# Patient Record
Sex: MALE | Race: WHITE | Employment: OTHER | URBAN - METROPOLITAN AREA
[De-identification: names, ages, dates, MRNs, and addresses within clinical notes are randomized per-mention and may not be internally consistent; named-entity substitution may affect disease eponyms.]

---

## 2018-06-07 ENCOUNTER — HOSPITAL ENCOUNTER (OUTPATIENT)
Dept: AUDIOLOGY | Age: 78
Discharge: HOME | End: 2018-06-07
Payer: COMMERCIAL

## 2018-06-07 PROCEDURE — 92567 TYMPANOMETRY: CPT | Performed by: AUDIOLOGIST

## 2018-06-07 PROCEDURE — 92557 COMPREHENSIVE HEARING TEST: CPT | Performed by: AUDIOLOGIST

## 2018-08-07 ENCOUNTER — HOSPITAL ENCOUNTER (OUTPATIENT)
Dept: AUDIOLOGY | Age: 78
Discharge: HOME | End: 2018-08-07

## 2018-08-23 ENCOUNTER — HOSPITAL ENCOUNTER (OUTPATIENT)
Dept: AUDIOLOGY | Age: 78
Discharge: HOME | End: 2018-08-23

## 2018-08-23 PROCEDURE — V5160 DISPENSING FEE BINAURAL: HCPCS | Performed by: AUDIOLOGIST

## 2018-08-23 PROCEDURE — V5261 HEARING AID, DIGIT, BIN, BTE: HCPCS | Performed by: AUDIOLOGIST

## 2019-04-06 ENCOUNTER — APPOINTMENT (OUTPATIENT)
Dept: GENERAL RADIOLOGY | Age: 79
End: 2019-04-06
Payer: COMMERCIAL

## 2019-04-06 ENCOUNTER — HOSPITAL ENCOUNTER (EMERGENCY)
Age: 79
Discharge: HOME | End: 2019-04-06
Payer: COMMERCIAL

## 2019-04-06 VITALS
HEART RATE: 78 BPM | TEMPERATURE: 97.8 F | BODY MASS INDEX: 20.88 KG/M2 | OXYGEN SATURATION: 99 % | DIASTOLIC BLOOD PRESSURE: 65 MMHG | HEIGHT: 67 IN | SYSTOLIC BLOOD PRESSURE: 151 MMHG | RESPIRATION RATE: 20 BRPM | WEIGHT: 133 LBS

## 2019-04-06 DIAGNOSIS — R11.2 NON-INTRACTABLE VOMITING WITH NAUSEA, UNSPECIFIED VOMITING TYPE: Primary | ICD-10-CM

## 2019-04-06 LAB
ALBUMIN SERPL-MCNC: 3.7 G/DL (ref 3.5–5.2)
ALP BLD-CCNC: 99 U/L (ref 40–129)
ALT SERPL-CCNC: 10 U/L (ref 0–40)
ANION GAP SERPL CALCULATED.3IONS-SCNC: 14 MMOL/L (ref 7–16)
AST SERPL-CCNC: 29 U/L (ref 0–39)
BASOPHILS ABSOLUTE: 0.03 E9/L (ref 0–0.2)
BASOPHILS RELATIVE PERCENT: 0.2 % (ref 0–2)
BETA-HYDROXYBUTYRATE: 1.27 MMOL/L (ref 0.02–0.27)
BILIRUB SERPL-MCNC: 1.1 MG/DL (ref 0–1.2)
BUN BLDV-MCNC: 16 MG/DL (ref 8–23)
CALCIUM SERPL-MCNC: 10.1 MG/DL (ref 8.6–10.2)
CHLORIDE BLD-SCNC: 102 MMOL/L (ref 98–107)
CO2: 23 MMOL/L (ref 22–29)
CO2: 25 MMOL/L (ref 22–29)
CO2: 29 MMOL/L (ref 22–29)
CREAT SERPL-MCNC: 0.9 MG/DL (ref 0.7–1.2)
EOSINOPHILS ABSOLUTE: 0.06 E9/L (ref 0.05–0.5)
EOSINOPHILS RELATIVE PERCENT: 0.4 % (ref 0–6)
GFR AFRICAN AMERICAN: >60
GFR NON-AFRICAN AMERICAN: >60 ML/MIN/1.73
GLUCOSE BLD-MCNC: 148 MG/DL (ref 74–99)
GLUCOSE BLD-MCNC: 159 MG/DL (ref 74–99)
GLUCOSE BLD-MCNC: 169 MG/DL (ref 74–99)
HCT VFR BLD CALC: 45.3 % (ref 37–54)
HEMOGLOBIN: 15.4 G/DL (ref 12.5–16.5)
IMMATURE GRANULOCYTES #: 0.05 E9/L
IMMATURE GRANULOCYTES %: 0.4 % (ref 0–5)
LACTIC ACID: 2 MMOL/L (ref 0.5–2.2)
LIPASE: 9 U/L (ref 13–60)
LYMPHOCYTES ABSOLUTE: 1.25 E9/L (ref 1.5–4)
LYMPHOCYTES RELATIVE PERCENT: 9.2 % (ref 20–42)
MCH RBC QN AUTO: 29.6 PG (ref 26–35)
MCHC RBC AUTO-ENTMCNC: 34 % (ref 32–34.5)
MCV RBC AUTO: 87.1 FL (ref 80–99.9)
MONOCYTES ABSOLUTE: 0.98 E9/L (ref 0.1–0.95)
MONOCYTES RELATIVE PERCENT: 7.2 % (ref 2–12)
NEUTROPHILS ABSOLUTE: 11.24 E9/L (ref 1.8–7.3)
NEUTROPHILS RELATIVE PERCENT: 82.6 % (ref 43–80)
PDW BLD-RTO: 12.7 FL (ref 11.5–15)
PH VENOUS: 7.39 (ref 7.3–7.42)
PLATELET # BLD: 176 E9/L (ref 130–450)
PMV BLD AUTO: 11.8 FL (ref 7–12)
POC ANION GAP: 13 MMOL/L (ref 7–16)
POC ANION GAP: 6 MMOL/L (ref 7–16)
POC BUN: 16 MG/DL (ref 8–23)
POC BUN: 22 MG/DL (ref 8–23)
POC CHLORIDE: 102 MMOL/L (ref 100–108)
POC CHLORIDE: 104 MMOL/L (ref 100–108)
POC CREATININE: 0.9 MG/DL (ref 0.7–1.2)
POC CREATININE: 0.9 MG/DL (ref 0.7–1.2)
POC POTASSIUM: 4 MMOL/L (ref 3.5–5)
POC POTASSIUM: 6.8 MMOL/L (ref 3.5–5)
POC SODIUM: 139 MMOL/L (ref 132–146)
POC SODIUM: 140 MMOL/L (ref 132–146)
POTASSIUM SERPL-SCNC: 5.9 MMOL/L (ref 3.5–5)
RBC # BLD: 5.2 E12/L (ref 3.8–5.8)
SODIUM BLD-SCNC: 139 MMOL/L (ref 132–146)
TOTAL PROTEIN: 7.3 G/DL (ref 6.4–8.3)
TROPONIN: <0.01 NG/ML (ref 0–0.03)
WBC # BLD: 13.6 E9/L (ref 4.5–11.5)

## 2019-04-06 PROCEDURE — 84520 ASSAY OF UREA NITROGEN: CPT

## 2019-04-06 PROCEDURE — 82800 BLOOD PH: CPT

## 2019-04-06 PROCEDURE — 80051 ELECTROLYTE PANEL: CPT

## 2019-04-06 PROCEDURE — 85025 COMPLETE CBC W/AUTO DIFF WBC: CPT

## 2019-04-06 PROCEDURE — 83690 ASSAY OF LIPASE: CPT

## 2019-04-06 PROCEDURE — 84484 ASSAY OF TROPONIN QUANT: CPT

## 2019-04-06 PROCEDURE — 74022 RADEX COMPL AQT ABD SERIES: CPT

## 2019-04-06 PROCEDURE — 36415 COLL VENOUS BLD VENIPUNCTURE: CPT

## 2019-04-06 PROCEDURE — 82565 ASSAY OF CREATININE: CPT

## 2019-04-06 PROCEDURE — 83605 ASSAY OF LACTIC ACID: CPT

## 2019-04-06 PROCEDURE — 82947 ASSAY GLUCOSE BLOOD QUANT: CPT

## 2019-04-06 PROCEDURE — 82010 KETONE BODYS QUAN: CPT

## 2019-04-06 PROCEDURE — 93005 ELECTROCARDIOGRAM TRACING: CPT | Performed by: PHYSICIAN ASSISTANT

## 2019-04-06 PROCEDURE — 80053 COMPREHEN METABOLIC PANEL: CPT

## 2019-04-06 PROCEDURE — 2580000003 HC RX 258: Performed by: PHYSICIAN ASSISTANT

## 2019-04-06 PROCEDURE — 99284 EMERGENCY DEPT VISIT MOD MDM: CPT

## 2019-04-06 RX ORDER — 0.9 % SODIUM CHLORIDE 0.9 %
500 INTRAVENOUS SOLUTION INTRAVENOUS ONCE
Status: COMPLETED | OUTPATIENT
Start: 2019-04-06 | End: 2019-04-06

## 2019-04-06 RX ADMIN — SODIUM CHLORIDE 500 ML: 9 INJECTION, SOLUTION INTRAVENOUS at 15:10

## 2019-04-06 NOTE — ED PROVIDER NOTES
[facility]       Department of Emergency Medicine   ED  Provider Note  Admit Date/RoomTime: 4/6/2019  1:48 PM  ED Room: 23/23  Chief Complaint   Emesis (vomiting since last pm no diarrhea)    History of Present Illness   Source of history provided by:  patient. History/Exam Limitations: none. Cat Arguelles is a 78 y.o. old male with a past medical history of   Past Medical History:   Diagnosis Date    Diabetes mellitus ([facility] [address]     Hypertension     presents to the emergency department by private vehicle, with complaints of nausea and vomiting that started last night. Patient states that he vomited once last night. Patient states that he has not had diarrhea. No fevers or chills. He denies dysuria. Family is concerned b/c he would not eat today. He states he doesn't feel hungry, but denies any current nausea. He denies chest pain or SOB. He denies any abdominal pain. ROS    Pertinent positives and negatives are stated within HPI, all other systems reviewed and are negative. Past Surgical History:   Procedure Laterality Date    CARDIAC SURGERY     Social History:  reports that he has never smoked. He has never used smokeless tobacco. He reports that he does not drink alcohol or use drugs. Family History: family history is not on file. Allergies: Patient has no known allergies. Physical Exam           ED Triage Vitals [04/06/19 1345]   BP Temp Temp Source Pulse Resp SpO2 Height Weight   (!) 150/82 97.8 °F (36.6 °C) Oral 108 16 97 % 5' 7\" (1.702 m) 133 lb (60.3 kg)      Oxygen Saturation Interpretation: Normal.    Constitutional:  Alert, development consistent with age. HEENT:  NC/NT. Airway patent. Neck:  Normal ROM. Supple. Respiratory:  Clear to auscultation and breath sounds equal.  CV:  Regular rate and rhythm, normal heart sounds, without pathological murmurs, ectopy, gallops, or rubs. GI:  General Appearance: normal.       Bowel sounds: normal bowel sounds.          Distension: None.          Tenderness: No abdominal tenderness, no rebound tenderness, no guarding. Liver: non-tender. Spleen:  non-tender. Pulsatile Mass: absent. Hernia:  no inguinal or femoral hernias noted. Rectal: (chaperone present during examination). not indicated / deferred. Back: CVA Tenderness: None b/l. Integument:  Normal turgor. Warm, dry, without visible rash, unless noted elsewhere. No pitting edema b/l. 2+ dorsalis pedis pulses b/l. Lymphatics: No lymphangitis or adenopathy noted. Neurological:  Oriented. Motor functions intact.     Lab / Imaging Results   (All laboratory and radiology results have been personally reviewed by myself)  Labs:  Results for orders placed or performed during the hospital encounter of 04/06/19   CBC Auto Differential   Result Value Ref Range    WBC 13.6 (H) 4.5 - 11.5 E9/L    RBC 5.20 3.80 - 5.80 E12/L    Hemoglobin 15.4 12.5 - 16.5 g/dL    Hematocrit 45.3 37.0 - 54.0 %    MCV 87.1 80.0 - 99.9 fL    MCH 29.6 26.0 - 35.0 pg    MCHC 34.0 32.0 - 34.5 %    RDW 12.7 11.5 - 15.0 fL    Platelets 929 966 - 061 E9/L    MPV 11.8 7.0 - 12.0 fL    Neutrophils % 82.6 (H) 43.0 - 80.0 %    Immature Granulocytes % 0.4 0.0 - 5.0 %    Lymphocytes % 9.2 (L) 20.0 - 42.0 %    Monocytes % 7.2 2.0 - 12.0 %    Eosinophils % 0.4 0.0 - 6.0 %    Basophils % 0.2 0.0 - 2.0 %    Neutrophils # 11.24 (H) 1.80 - 7.30 E9/L    Immature Granulocytes # 0.05 E9/L    Lymphocytes # 1.25 (L) 1.50 - 4.00 E9/L    Monocytes # 0.98 (H) 0.10 - 0.95 E9/L    Eosinophils # 0.06 0.05 - 0.50 E9/L    Basophils # 0.03 0.00 - 0.20 E9/L   Comprehensive Metabolic Panel   Result Value Ref Range    Sodium 139 132 - 146 mmol/L    Potassium 5.9 (H) 3.5 - 5.0 mmol/L    Chloride 102 98 - 107 mmol/L    CO2 23 22 - 29 mmol/L    Anion Gap 14 7 - 16 mmol/L    Glucose 169 (H) 74 - 99 mg/dL    BUN 16 8 - 23 mg/dL    CREATININE 0.9 0.7 - 1.2 mg/dL    GFR Non-African American >60 >=60 mL/min/1.73    GFR African American >60     Calcium 10.1 8.6 - 10.2 mg/dL    Total Protein 7.3 6.4 - 8.3 g/dL    Alb 3.7 3.5 - 5.2 g/dL    Total Bilirubin 1.1 0.0 - 1.2 mg/dL    Alkaline Phosphatase 99 40 - 129 U/L    ALT 10 0 - 40 U/L    AST 29 0 - 39 U/L   Troponin   Result Value Ref Range    Troponin <0.01 0.00 - 0.03 ng/mL   Lactic Acid, Plasma   Result Value Ref Range    Lactic Acid 2.0 0.5 - 2.2 mmol/L   Lipase   Result Value Ref Range    Lipase 9 (L) 13 - 60 U/L   pH, venous   Result Value Ref Range    pH, Mahesh 7.39 7.30 - 7.42   Beta-Hydroxybutyrate   Result Value Ref Range    Beta-Hydroxybutyrate 1.27 (H) 0.02 - 0.27 mmol/L   POCT Venous   Result Value Ref Range    POC Sodium 139 132 - 146 mmol/L    POC Potassium 6.8 (HH) 3.5 - 5.0 mmol/L    POC Chloride 104 100 - 108 mmol/L    CO2 29 22 - 29 mmol/L    POC Anion Gap 6 (L) 7 - 16 mmol/L    POC Glucose 148 (H) 74 - 99 mg/dl    POC BUN 22 8 - 23 mg/dL    POC Creatinine 0.9 0.7 - 1.2 mg/dL    GFR Non-African American >60 >=60 mL/min/1.73    GFR African American >60    POCT Venous   Result Value Ref Range    POC Sodium 140 132 - 146 mmol/L    POC Potassium 4.0 3.5 - 5.0 mmol/L    POC Chloride 102 100 - 108 mmol/L    CO2 25 22 - 29 mmol/L    POC Anion Gap 13 7 - 16 mmol/L    POC Glucose 159 (H) 74 - 99 mg/dl    POC BUN 16 8 - 23 mg/dL    POC Creatinine 0.9 0.7 - 1.2 mg/dL    GFR Non-African American >60 >=60 mL/min/1.73    GFR African American >60    EKG 12 Lead   Result Value Ref Range    Ventricular Rate 92 BPM    Atrial Rate 92 BPM    P-R Interval 144 ms    QRS Duration 112 ms    Q-T Interval 364 ms    QTc Calculation (Bazett) 450 ms    P Axis 68 degrees    R Axis -22 degrees    T Axis 37 degrees     Imaging: All Radiology results interpreted by Radiologist unless otherwise noted. XR Acute Abd Series Chest 1 VW   Final Result   Normal acute abdominal series.            ED Course / Medical Decision Making     Medications   0.9 % sodium chloride bolus (0 mLs Intravenous Stopped 4/6/19 1617)        Re-examination:  4/6/19       Time: 2070   Patient updated on results. Continues to deny any abdominal pain to palpation. Abdomen is soft and nontender throughout palpation. No rigidity or sign of surgical abdomen. Agreeable to be discharged home at this time. EKG #1:  Interpreted by emergency department physician unless otherwise noted. Time:  1417    Rate: 92 bpm  Rhythm: Sinus. No ST segment elevation or depression. IL int is 144 ms, QRS duration is 112 ms. Incomplete RBBB. No acute changes from EKG done on 11/20/17. Consult(s):   none. Procedure(s):   none    MDM:   Patient denies any complaints upon arrival to the ER. He has had no abdominal pain to palpation throughout his stay today. There is no rigidity or sign of surgical abdomen. Labs are sent are unremarkable, no acute findings seen on x-ray today. Vital signs are stable. Patient appears well. We'll discharge home at this time. Advised patient to return to the ER for any worsening symptoms. Otherwise he is to follow-up with his PCP. Counseling: The emergency provider has spoken with the patient and discussed todays results, in addition to providing specific details for the plan of care and counseling regarding the diagnosis and prognosis. Questions are answered at this time and they are agreeable with the plan. Assessment     1. Non-intractable vomiting with nausea, unspecified vomiting type      Plan   Discharge to home  Patient condition is good    New Medications     Discharge Medication List as of 4/6/2019  4:41 PM        Electronically signed by MARQUIS Strong   DD: 4/6/19  **This report was transcribed using voice recognition software. Every effort was made to ensure accuracy; however, inadvertent computerized transcription errors may be present.   END OF ED PROVIDER NOTE       Veronica Strong  04/06/19 7350

## 2019-04-07 LAB
EKG ATRIAL RATE: 92 BPM
EKG P AXIS: 68 DEGREES
EKG P-R INTERVAL: 144 MS
EKG Q-T INTERVAL: 364 MS
EKG QRS DURATION: 112 MS
EKG QTC CALCULATION (BAZETT): 450 MS
EKG R AXIS: -22 DEGREES
EKG T AXIS: 37 DEGREES
EKG VENTRICULAR RATE: 92 BPM

## 2019-06-08 ENCOUNTER — APPOINTMENT (OUTPATIENT)
Dept: GENERAL RADIOLOGY | Age: 79
End: 2019-06-08
Payer: COMMERCIAL

## 2019-06-08 ENCOUNTER — HOSPITAL ENCOUNTER (EMERGENCY)
Age: 79
Discharge: HOME | End: 2019-06-08
Attending: EMERGENCY MEDICINE
Payer: COMMERCIAL

## 2019-06-08 VITALS
HEIGHT: 68 IN | RESPIRATION RATE: 20 BRPM | HEART RATE: 71 BPM | BODY MASS INDEX: 18.49 KG/M2 | DIASTOLIC BLOOD PRESSURE: 78 MMHG | WEIGHT: 122 LBS | SYSTOLIC BLOOD PRESSURE: 179 MMHG | OXYGEN SATURATION: 99 % | TEMPERATURE: 97.6 F

## 2019-06-08 DIAGNOSIS — M53.3 SACRAL PAIN: ICD-10-CM
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
## 2019-06-08 LAB
ANION GAP SERPL CALCULATED.3IONS-SCNC: 19 MMOL/L (ref 7–16)
BASOPHILS ABSOLUTE: 0.02 E9/L (ref 0–0.2)
BASOPHILS RELATIVE PERCENT: 0.2 % (ref 0–2)
BUN BLDV-MCNC: 19 MG/DL (ref 8–23)
CALCIUM SERPL-MCNC: 10.1 MG/DL (ref 8.6–10.2)
CHLORIDE BLD-SCNC: 93 MMOL/L (ref 98–107)
CO2: 20 MMOL/L (ref 22–29)
CREAT SERPL-MCNC: 1 MG/DL (ref 0.7–1.2)
EOSINOPHILS ABSOLUTE: 0.05 E9/L (ref 0.05–0.5)
EOSINOPHILS RELATIVE PERCENT: 0.5 % (ref 0–6)
GFR AFRICAN AMERICAN: >60
GFR NON-AFRICAN AMERICAN: >60 ML/MIN/1.73
GLUCOSE BLD-MCNC: 456 MG/DL (ref 74–99)
HCT VFR BLD CALC: 43 % (ref 37–54)
HEMOGLOBIN: 14.5 G/DL (ref 12.5–16.5)
IMMATURE GRANULOCYTES #: 0.04 E9/L
IMMATURE GRANULOCYTES %: 0.4 % (ref 0–5)
LYMPHOCYTES ABSOLUTE: 1.28 E9/L (ref 1.5–4)
LYMPHOCYTES RELATIVE PERCENT: 11.7 % (ref 20–42)
MCH RBC QN AUTO: 28.8 PG (ref 26–35)
MCHC RBC AUTO-ENTMCNC: 33.7 % (ref 32–34.5)
MCV RBC AUTO: 85.3 FL (ref 80–99.9)
MONOCYTES ABSOLUTE: 0.83 E9/L (ref 0.1–0.95)
MONOCYTES RELATIVE PERCENT: 7.6 % (ref 2–12)
NEUTROPHILS ABSOLUTE: 8.73 E9/L (ref 1.8–7.3)
NEUTROPHILS RELATIVE PERCENT: 79.6 % (ref 43–80)
PDW BLD-RTO: 13.3 FL (ref 11.5–15)
PLATELET # BLD: 232 E9/L (ref 130–450)
PMV BLD AUTO: 11 FL (ref 7–12)
POTASSIUM SERPL-SCNC: 4.8 MMOL/L (ref 3.5–5)
RBC # BLD: 5.04 E12/L (ref 3.8–5.8)
SODIUM BLD-SCNC: 132 MMOL/L (ref 132–146)
TOTAL CK: 57 U/L (ref 20–200)
TROPONIN: <0.01 NG/ML (ref 0–0.03)
WBC # BLD: 11 E9/L (ref 4.5–11.5)

## 2019-06-08 PROCEDURE — 71045 X-RAY EXAM CHEST 1 VIEW: CPT

## 2019-06-08 PROCEDURE — 72220 X-RAY EXAM SACRUM TAILBONE: CPT

## 2019-06-08 PROCEDURE — 82550 ASSAY OF CK (CPK): CPT

## 2019-06-08 PROCEDURE — 99284 EMERGENCY DEPT VISIT MOD MDM: CPT

## 2019-06-08 PROCEDURE — 80048 BASIC METABOLIC PNL TOTAL CA: CPT

## 2019-06-08 PROCEDURE — 72170 X-RAY EXAM OF PELVIS: CPT

## 2019-06-08 PROCEDURE — 85025 COMPLETE CBC W/AUTO DIFF WBC: CPT

## 2019-06-08 PROCEDURE — 93005 ELECTROCARDIOGRAM TRACING: CPT | Performed by: EMERGENCY MEDICINE

## 2019-06-08 PROCEDURE — 36415 COLL VENOUS BLD VENIPUNCTURE: CPT

## 2019-06-08 PROCEDURE — 84484 ASSAY OF TROPONIN QUANT: CPT

## 2019-06-08 RX ORDER — METOPROLOL SUCCINATE 25 MG/1
25 TABLET, EXTENDED RELEASE ORAL DAILY
COMMUNITY
End: 2019-10-30 | Stop reason: ALTCHOICE

## 2019-06-09 LAB
EKG ATRIAL RATE: 82 BPM
EKG P AXIS: 18 DEGREES
EKG P-R INTERVAL: 122 MS
EKG Q-T INTERVAL: 412 MS
EKG QRS DURATION: 106 MS
EKG QTC CALCULATION (BAZETT): 481 MS
EKG R AXIS: 22 DEGREES
EKG T AXIS: 32 DEGREES
EKG VENTRICULAR RATE: 82 BPM

## 2019-06-09 PROCEDURE — 93010 ELECTROCARDIOGRAM REPORT: CPT | Performed by: INTERNAL MEDICINE

## 2019-06-09 ASSESSMENT — ENCOUNTER SYMPTOMS
PHOTOPHOBIA: 0
DIARRHEA: 0
CONSTIPATION: 0
WHEEZING: 0
SHORTNESS OF BREATH: 0
ABDOMINAL PAIN: 0
SORE THROAT: 0
NAUSEA: 0
VOMITING: 0
COUGH: 0
RHINORRHEA: 0
SINUS PRESSURE: 0
SINUS PAIN: 0

## 2019-06-09 NOTE — ED PROVIDER NOTES
Patient is an 78-year-old male who presents with chief complaint of coccyx pain following a fall. He is accompanied by his family members provide most the history. They state that they last checked on their father at 6:00 PM, when they put him into bed. They came to check on him this morning and found him on the floor in the living room. The patient states that he lost his balance and fell onto his coccyx. He did not hit his head or lose consciousness. He was unable to get up off the ground because he is weak. He laid on the ground for approximately 15 hours. Patient complains of coccyx pain, denies any numbness or tingling of the lower extremities. No treatment prior to arrival. The patient has no complaints other than the coccyx pain. He denies any headache, lightheadedness, dizziness, chest pain, shortness of breath, abdominal pain, nausea, vomiting. The history is provided by a relative. No  was used. Review of Systems   Constitutional: Negative for chills, fatigue and fever. HENT: Negative for congestion, rhinorrhea, sinus pressure, sinus pain, sneezing and sore throat. Eyes: Negative for photophobia and visual disturbance. Respiratory: Negative for cough, shortness of breath and wheezing. Cardiovascular: Negative for chest pain and palpitations. Gastrointestinal: Negative for abdominal pain, constipation, diarrhea, nausea and vomiting. Genitourinary: Negative for dysuria, frequency and hematuria. Musculoskeletal: Positive for arthralgias (coccyx pain). Negative for neck pain and neck stiffness. Skin: Negative for rash and wound. Neurological: Negative for dizziness, light-headedness and headaches. Psychiatric/Behavioral: Negative for agitation, behavioral problems and confusion. Physical Exam   Constitutional: He is oriented to person, place, and time. He appears well-developed and well-nourished. No distress.    HENT:   Head: Normocephalic and atraumatic. Eyes: Conjunctivae are normal. Right eye exhibits no discharge. Left eye exhibits no discharge. No scleral icterus. Neck: Normal range of motion. Neck supple. Cardiovascular: Normal rate and regular rhythm. No murmur heard. Pulmonary/Chest: Effort normal and breath sounds normal. No stridor. No respiratory distress. Abdominal: Soft. Bowel sounds are normal. He exhibits no distension. There is no tenderness. Genitourinary:   Genitourinary Comments: Stage 1 decubitus ulcer of the coccyx. No overlying ecchymosis. Musculoskeletal: Normal range of motion. He exhibits no edema or tenderness. Patient moves all 4 extremities. No obvious deformity. No overlying ecchymosis. No midline tenderness of the thoracic or lumbar spine. Neurological: He is alert and oriented to person, place, and time. The patient is alert and oriented, he is following all commands. No facial droop. Skin: Skin is warm. Capillary refill takes less than 2 seconds. He is not diaphoretic. No erythema. No pallor. Psychiatric: He has a normal mood and affect. His behavior is normal.       Procedures    [facility]            --------------------------------------------- PAST HISTORY ---------------------------------------------  Past Medical History:  has a past medical history of Diabetes mellitus ([facility] [address] and Hypertension. Past Surgical History:  has a past surgical history that includes Cardiac surgery. Social History:  reports that he has never smoked. He has never used smokeless tobacco. He reports that he does not drink alcohol or use drugs. Family History: family history is not on file. The patients home medications have been reviewed. Allergies: Patient has no known allergies.     -------------------------------------------------- RESULTS -------------------------------------------------  Labs:  Results for orders placed or performed during the hospital encounter of 06/08/19   CBC Auto Differential   Result Value Ref Range    WBC 11.0 4.5 - 11.5 E9/L    RBC 5.04 3.80 - 5.80 E12/L    Hemoglobin 14.5 12.5 - 16.5 g/dL    Hematocrit 43.0 37.0 - 54.0 %    MCV 85.3 80.0 - 99.9 fL    MCH 28.8 26.0 - 35.0 pg    MCHC 33.7 32.0 - 34.5 %    RDW 13.3 11.5 - 15.0 fL    Platelets 669 624 - 222 E9/L    MPV 11.0 7.0 - 12.0 fL    Neutrophils % 79.6 43.0 - 80.0 %    Immature Granulocytes % 0.4 0.0 - 5.0 %    Lymphocytes % 11.7 (L) 20.0 - 42.0 %    Monocytes % 7.6 2.0 - 12.0 %    Eosinophils % 0.5 0.0 - 6.0 %    Basophils % 0.2 0.0 - 2.0 %    Neutrophils # 8.73 (H) 1.80 - 7.30 E9/L    Immature Granulocytes # 0.04 E9/L    Lymphocytes # 1.28 (L) 1.50 - 4.00 E9/L    Monocytes # 0.83 0.10 - 0.95 E9/L    Eosinophils # 0.05 0.05 - 0.50 E9/L    Basophils # 0.02 0.00 - 0.20 U5/G   Basic Metabolic Panel   Result Value Ref Range    Sodium 132 132 - 146 mmol/L    Potassium 4.8 3.5 - 5.0 mmol/L    Chloride 93 (L) 98 - 107 mmol/L    CO2 20 (L) 22 - 29 mmol/L    Anion Gap 19 (H) 7 - 16 mmol/L    Glucose 456 (H) 74 - 99 mg/dL    BUN 19 8 - 23 mg/dL    CREATININE 1.0 0.7 - 1.2 mg/dL    GFR Non-African American >60 >=60 mL/min/1.73    GFR African American >60     Calcium 10.1 8.6 - 10.2 mg/dL   Troponin   Result Value Ref Range    Troponin <0.01 0.00 - 0.03 ng/mL   CK   Result Value Ref Range    Total CK 57 20 - 200 U/L   EKG 12 Lead   Result Value Ref Range    Ventricular Rate 82 BPM    Atrial Rate 82 BPM    P-R Interval 122 ms    QRS Duration 106 ms    Q-T Interval 412 ms    QTc Calculation (Bazett) 481 ms    P Axis 18 degrees    R Axis 22 degrees    T Axis 32 degrees       Radiology:  XR CHEST 1 VW   Final Result   1. Negative portable chest.        XR PELVIS (1-2 VIEWS)   Final Result         1. No acute traumatic bony injury radiographically. 2. Mild osteitis pubis. 3. 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 XR SACRUM COCCYX (MIN 2 VIEWS)   Final Result      1. No acute traumatic bony injury radiographically.    2. Very mild osteitis pubis.   3. Moderate posterior facet joint degenerative changes at L5-S1 level. 4. Moderate neural foraminal narrowing at L5-S1 level.             ------------------------- NURSING NOTES AND VITALS REVIEWED ---------------------------  Date / Time Roomed:  6/8/2019  6:01 PM  ED Bed Assignment:  09/09    The nursing notes within the ED encounter and vital signs as below have been reviewed. BP (!) 179/78   Pulse 71   Temp 97.6 °F (36.4 °C) (Oral)   Resp 20   Ht 5' 8\" (1.727 m)   Wt 122 lb (55.3 kg)   SpO2 99%   BMI 18.55 kg/m²   Oxygen Saturation Interpretation: Normal      ------------------------------------------ PROGRESS NOTES ------------------------------------------  I have spoken with the patient and daughters and discussed todays results, in addition to providing specific details for the plan of care and counseling regarding the diagnosis and prognosis. Their questions are answered at this time and they are agreeable with the plan. I discussed at length with them reasons for immediate return here for re evaluation. They will followup with primary care by calling their office tomorrow. EKG: This EKG is signed and interpreted by me. Rate: 82  Rhythm: Sinus  Interpretation: Sinus rhythm, incomplete right bundle branch block. Comparison: No acute ischemic changes from comparison EKG on 04/6/19.      --------------------------------- ADDITIONAL PROVIDER NOTES ---------------------------------  At this time the patient is without objective evidence of an acute process requiring hospitalization or inpatient management. They have remained hemodynamically stable throughout their entire ED visit and are stable for discharge with outpatient follow-up. The plan has been discussed in detail and they are aware of the specific conditions for emergent return, as well as the importance of follow-up. Discharge Medication List as of 6/8/2019  9:01 PM          Diagnosis:  1.  Fall, initial encounter 2. Sacral pain        Disposition:  Patient's disposition: Discharge to home  Patient's condition is stable.            Abby Miranda DO  Resident  06/09/19 9742

## 2019-06-16 ENCOUNTER — APPOINTMENT (OUTPATIENT)
Dept: GENERAL RADIOLOGY | Age: 79
DRG: 689 | End: 2019-06-16
Payer: COMMERCIAL

## 2019-06-16 ENCOUNTER — HOSPITAL ENCOUNTER (INPATIENT)
Age: 79
LOS: 4 days | Discharge: NURSING, REHAB OR LONG-TERM CARE | DRG: 689 | End: 2019-06-20
Attending: EMERGENCY MEDICINE | Admitting: INTERNAL MEDICINE
Payer: COMMERCIAL

## 2019-06-16 ENCOUNTER — APPOINTMENT (OUTPATIENT)
Dept: CT IMAGING | Age: 79
DRG: 689 | End: 2019-06-16
Payer: COMMERCIAL

## 2019-06-16 DIAGNOSIS — N39.0 URINARY TRACT INFECTION WITHOUT HEMATURIA, SITE UNSPECIFIED: Primary | ICD-10-CM

## 2019-06-16 DIAGNOSIS — R26.2 AMBULATORY DYSFUNCTION: ICD-10-CM

## 2019-06-16 DIAGNOSIS — R53.1 GENERALIZED WEAKNESS: ICD-10-CM

## 2019-06-16 PROBLEM — F03.90 DEMENTIA (HCC): Status: ACTIVE | Noted: 2019-06-16

## 2019-06-16 PROBLEM — I63.50 CEREBRAL ARTERY OCCLUSION WITH CEREBRAL INFARCTION (HCC): Status: ACTIVE | Noted: 2019-06-16

## 2019-06-16 LAB
ALBUMIN SERPL-MCNC: 3.4 G/DL (ref 3.5–5.2)
ALP BLD-CCNC: 107 U/L (ref 40–129)
ALT SERPL-CCNC: 18 U/L (ref 0–40)
ANION GAP SERPL CALCULATED.3IONS-SCNC: 10 MMOL/L (ref 7–16)
AST SERPL-CCNC: 17 U/L (ref 0–39)
BACTERIA: ABNORMAL /HPF
BASOPHILS ABSOLUTE: 0.02 E9/L (ref 0–0.2)
BASOPHILS RELATIVE PERCENT: 0.3 % (ref 0–2)
BILIRUB SERPL-MCNC: 0.8 MG/DL (ref 0–1.2)
BILIRUBIN URINE: NEGATIVE
BLOOD, URINE: ABNORMAL
BUN BLDV-MCNC: 22 MG/DL (ref 8–23)
CALCIUM SERPL-MCNC: 9.4 MG/DL (ref 8.6–10.2)
CHLORIDE BLD-SCNC: 98 MMOL/L (ref 98–107)
CHP ED QC CHECK: YES
CLARITY: ABNORMAL
CO2: 23 MMOL/L (ref 22–29)
COLOR: YELLOW
CREAT SERPL-MCNC: 1 MG/DL (ref 0.7–1.2)
EOSINOPHILS ABSOLUTE: 0 E9/L (ref 0.05–0.5)
EOSINOPHILS RELATIVE PERCENT: 0 % (ref 0–6)
EPITHELIAL CELLS, UA: ABNORMAL /HPF
GFR AFRICAN AMERICAN: >60
GFR NON-AFRICAN AMERICAN: >60 ML/MIN/1.73
GLUCOSE BLD-MCNC: 222 MG/DL
GLUCOSE BLD-MCNC: 251 MG/DL (ref 74–99)
GLUCOSE URINE: 500 MG/DL
HCT VFR BLD CALC: 39.8 % (ref 37–54)
HEMOGLOBIN: 13.8 G/DL (ref 12.5–16.5)
IMMATURE GRANULOCYTES #: 0.09 E9/L
IMMATURE GRANULOCYTES %: 1.2 % (ref 0–5)
KETONES, URINE: 15 MG/DL
LACTIC ACID: 1.1 MMOL/L (ref 0.5–2.2)
LEUKOCYTE ESTERASE, URINE: ABNORMAL
LIPASE: 29 U/L (ref 13–60)
LYMPHOCYTES ABSOLUTE: 0.64 E9/L (ref 1.5–4)
LYMPHOCYTES RELATIVE PERCENT: 8.4 % (ref 20–42)
MCH RBC QN AUTO: 28.9 PG (ref 26–35)
MCHC RBC AUTO-ENTMCNC: 34.7 % (ref 32–34.5)
MCV RBC AUTO: 83.3 FL (ref 80–99.9)
METER GLUCOSE: 192 MG/DL (ref 74–99)
METER GLUCOSE: 222 MG/DL (ref 74–99)
MONOCYTES ABSOLUTE: 0.41 E9/L (ref 0.1–0.95)
MONOCYTES RELATIVE PERCENT: 5.4 % (ref 2–12)
NEUTROPHILS ABSOLUTE: 6.48 E9/L (ref 1.8–7.3)
NEUTROPHILS RELATIVE PERCENT: 84.7 % (ref 43–80)
NITRITE, URINE: NEGATIVE
PDW BLD-RTO: 13.2 FL (ref 11.5–15)
PH UA: 5.5 (ref 5–9)
PLATELET # BLD: 224 E9/L (ref 130–450)
PMV BLD AUTO: 10.7 FL (ref 7–12)
POTASSIUM REFLEX MAGNESIUM: 4.8 MMOL/L (ref 3.5–5)
PROTEIN UA: 30 MG/DL
RBC # BLD: 4.78 E12/L (ref 3.8–5.8)
RBC UA: ABNORMAL /HPF (ref 0–2)
SODIUM BLD-SCNC: 131 MMOL/L (ref 132–146)
SPECIFIC GRAVITY UA: 1.02 (ref 1–1.03)
TOTAL PROTEIN: 7.1 G/DL (ref 6.4–8.3)
TROPONIN: <0.01 NG/ML (ref 0–0.03)
UROBILINOGEN, URINE: 1 E.U./DL
WBC # BLD: 7.6 E9/L (ref 4.5–11.5)
WBC UA: >20 /HPF (ref 0–5)

## 2019-06-16 PROCEDURE — 1200000000 HC SEMI PRIVATE

## 2019-06-16 PROCEDURE — 87077 CULTURE AEROBIC IDENTIFY: CPT

## 2019-06-16 PROCEDURE — 83690 ASSAY OF LIPASE: CPT

## 2019-06-16 PROCEDURE — 82962 GLUCOSE BLOOD TEST: CPT

## 2019-06-16 PROCEDURE — 84484 ASSAY OF TROPONIN QUANT: CPT

## 2019-06-16 PROCEDURE — 6370000000 HC RX 637 (ALT 250 FOR IP): Performed by: INTERNAL MEDICINE

## 2019-06-16 PROCEDURE — 36415 COLL VENOUS BLD VENIPUNCTURE: CPT

## 2019-06-16 PROCEDURE — 81001 URINALYSIS AUTO W/SCOPE: CPT

## 2019-06-16 PROCEDURE — 70450 CT HEAD/BRAIN W/O DYE: CPT

## 2019-06-16 PROCEDURE — 6360000002 HC RX W HCPCS: Performed by: EMERGENCY MEDICINE

## 2019-06-16 PROCEDURE — 6360000002 HC RX W HCPCS: Performed by: INTERNAL MEDICINE

## 2019-06-16 PROCEDURE — 85025 COMPLETE CBC W/AUTO DIFF WBC: CPT

## 2019-06-16 PROCEDURE — 83605 ASSAY OF LACTIC ACID: CPT

## 2019-06-16 PROCEDURE — 2580000003 HC RX 258: Performed by: EMERGENCY MEDICINE

## 2019-06-16 PROCEDURE — 2580000003 HC RX 258: Performed by: INTERNAL MEDICINE

## 2019-06-16 PROCEDURE — 87186 SC STD MICRODIL/AGAR DIL: CPT

## 2019-06-16 PROCEDURE — 71045 X-RAY EXAM CHEST 1 VIEW: CPT

## 2019-06-16 PROCEDURE — 99285 EMERGENCY DEPT VISIT HI MDM: CPT

## 2019-06-16 PROCEDURE — 87088 URINE BACTERIA CULTURE: CPT

## 2019-06-16 PROCEDURE — 80053 COMPREHEN METABOLIC PANEL: CPT

## 2019-06-16 RX ORDER — ACETAMINOPHEN 325 MG/1
650 TABLET ORAL EVERY 4 HOURS PRN
Status: DISCONTINUED | OUTPATIENT
Start: 2019-06-16 | End: 2019-06-21 | Stop reason: HOSPADM

## 2019-06-16 RX ORDER — SODIUM CHLORIDE 9 MG/ML
INJECTION, SOLUTION INTRAVENOUS ONCE
Status: COMPLETED | OUTPATIENT
Start: 2019-06-16 | End: 2019-06-16

## 2019-06-16 RX ORDER — SODIUM CHLORIDE 0.9 % (FLUSH) 0.9 %
10 SYRINGE (ML) INJECTION PRN
Status: DISCONTINUED | OUTPATIENT
Start: 2019-06-16 | End: 2019-06-21 | Stop reason: HOSPADM

## 2019-06-16 RX ORDER — SODIUM CHLORIDE 0.9 % (FLUSH) 0.9 %
10 SYRINGE (ML) INJECTION EVERY 12 HOURS SCHEDULED
Status: DISCONTINUED | OUTPATIENT
Start: 2019-06-16 | End: 2019-06-16

## 2019-06-16 RX ORDER — OMEPRAZOLE 40 MG/1
40 CAPSULE, DELAYED RELEASE ORAL DAILY
Status: ON HOLD | COMMUNITY
End: 2019-06-20 | Stop reason: HOSPADM

## 2019-06-16 RX ORDER — SODIUM CHLORIDE 0.9 % (FLUSH) 0.9 %
10 SYRINGE (ML) INJECTION EVERY 12 HOURS SCHEDULED
Status: DISCONTINUED | OUTPATIENT
Start: 2019-06-16 | End: 2019-06-21 | Stop reason: HOSPADM

## 2019-06-16 RX ORDER — SIMVASTATIN 40 MG
40 TABLET ORAL NIGHTLY
Status: DISCONTINUED | OUTPATIENT
Start: 2019-06-16 | End: 2019-06-21 | Stop reason: HOSPADM

## 2019-06-16 RX ORDER — METOPROLOL SUCCINATE 25 MG/1
25 TABLET, EXTENDED RELEASE ORAL DAILY
Status: DISCONTINUED | OUTPATIENT
Start: 2019-06-17 | End: 2019-06-21 | Stop reason: HOSPADM

## 2019-06-16 RX ORDER — DEXTROSE MONOHYDRATE 50 MG/ML
100 INJECTION, SOLUTION INTRAVENOUS PRN
Status: DISCONTINUED | OUTPATIENT
Start: 2019-06-16 | End: 2019-06-21 | Stop reason: HOSPADM

## 2019-06-16 RX ORDER — ONDANSETRON 2 MG/ML
4 INJECTION INTRAMUSCULAR; INTRAVENOUS EVERY 6 HOURS PRN
Status: DISCONTINUED | OUTPATIENT
Start: 2019-06-16 | End: 2019-06-21 | Stop reason: HOSPADM

## 2019-06-16 RX ORDER — DEXTROSE MONOHYDRATE 25 G/50ML
12.5 INJECTION, SOLUTION INTRAVENOUS PRN
Status: DISCONTINUED | OUTPATIENT
Start: 2019-06-16 | End: 2019-06-21 | Stop reason: HOSPADM

## 2019-06-16 RX ORDER — FAMOTIDINE 20 MG/1
20 TABLET, FILM COATED ORAL 2 TIMES DAILY
Status: DISCONTINUED | OUTPATIENT
Start: 2019-06-16 | End: 2019-06-17

## 2019-06-16 RX ORDER — NICOTINE POLACRILEX 4 MG
15 LOZENGE BUCCAL PRN
Status: DISCONTINUED | OUTPATIENT
Start: 2019-06-16 | End: 2019-06-21 | Stop reason: HOSPADM

## 2019-06-16 RX ORDER — PANTOPRAZOLE SODIUM 40 MG/1
40 TABLET, DELAYED RELEASE ORAL
Status: DISCONTINUED | OUTPATIENT
Start: 2019-06-17 | End: 2019-06-17

## 2019-06-16 RX ORDER — SODIUM CHLORIDE 0.9 % (FLUSH) 0.9 %
10 SYRINGE (ML) INJECTION PRN
Status: DISCONTINUED | OUTPATIENT
Start: 2019-06-16 | End: 2019-06-16

## 2019-06-16 RX ORDER — 0.9 % SODIUM CHLORIDE 0.9 %
1000 INTRAVENOUS SOLUTION INTRAVENOUS ONCE
Status: COMPLETED | OUTPATIENT
Start: 2019-06-16 | End: 2019-06-16

## 2019-06-16 RX ORDER — RANITIDINE 150 MG/1
150 TABLET ORAL 2 TIMES DAILY
Status: ON HOLD | COMMUNITY
End: 2019-06-20 | Stop reason: HOSPADM

## 2019-06-16 RX ADMIN — SODIUM CHLORIDE 1000 ML: 9 INJECTION, SOLUTION INTRAVENOUS at 14:51

## 2019-06-16 RX ADMIN — ONDANSETRON 4 MG: 2 INJECTION INTRAMUSCULAR; INTRAVENOUS at 20:59

## 2019-06-16 RX ADMIN — WATER 2 G: 1 INJECTION INTRAMUSCULAR; INTRAVENOUS; SUBCUTANEOUS at 16:14

## 2019-06-16 RX ADMIN — SODIUM CHLORIDE: 9 INJECTION, SOLUTION INTRAVENOUS at 19:15

## 2019-06-16 ASSESSMENT — ENCOUNTER SYMPTOMS
VOMITING: 0
DIARRHEA: 0
COUGH: 0
BACK PAIN: 0
NAUSEA: 0
SHORTNESS OF BREATH: 0
ABDOMINAL PAIN: 0

## 2019-06-16 ASSESSMENT — PAIN SCALES - GENERAL: PAINLEVEL_OUTOF10: 0

## 2019-06-16 NOTE — H&P
History and Physical      CHIEF COMPLAINT:  Difficulty Walking (difficulty ambulating since yesterday) and Fatigue (generalized weakness (A&O x 2, baseline per EMS))    History Obtained From:  patient, family member - daughter, electronic medical record    HISTORY OF PRESENT ILLNESS:    The patient is a 78 y.o. male with a hx of cva and dementia who presents with worsening generalized weakness over the past few weeks. Pt was in the ED last week after a fall in which he reportedly was on the floor for 15 hours and was too weak to get up. At that time family wanted to take him home. Since then pt has been getting worse so they brought him back in and w/u in the ED found a symptomatic UTI which may be contributing to pts ambulatory issues. Past Medical History:    Past Medical History:   Diagnosis Date    Cerebral artery occlusion with cerebral infarction ([facility] [address]     Diabetes mellitus ([facility] [address]     Hyperlipidemia     Hypertension      Past Surgical History:    Past Surgical History:   Procedure Laterality Date    CARDIAC SURGERY       Medications Prior to Admission:    Medications Prior to Admission: ranitidine (ZANTAC) 150 MG tablet, Take 150 mg by mouth 2 times daily  omeprazole (PRILOSEC) 40 MG delayed release capsule, Take 40 mg by mouth daily  vitamin D (CHOLECALCIFEROL) 1000 UNIT TABS tablet, Take 1,000 Units by mouth daily  rivaroxaban (XARELTO) 10 MG TABS tablet, Take 10 mg by mouth daily (with breakfast)  metoprolol succinate (TOPROL XL) 25 MG extended release tablet, Take 25 mg by mouth daily  simvastatin (ZOCOR) 40 MG tablet, Take 40 mg by mouth nightly. metformin (GLUCOPHAGE) 500 MG tablet, Take 1,000 mg by mouth 2 times daily (with meals). Allergies:    Patient has no known allergies. Social History:    reports that he has never smoked. He has never used smokeless tobacco. He reports that he drinks alcohol. He reports that he does not use drugs.       Family History:   family history is BUN 22 06/16/2019    CREATININE 1.0 06/16/2019    GFRAA >60 06/16/2019    LABGLOM >60 06/16/2019    GLUCOSE 222 06/16/2019    GLUCOSE 159 03/07/2011    PROT 7.1 06/16/2019    LABALBU 3.4 06/16/2019    LABALBU 4.1 03/07/2011    CALCIUM 9.4 06/16/2019    BILITOT 0.8 06/16/2019    ALKPHOS 107 06/16/2019    AST 17 06/16/2019    ALT 18 06/16/2019     Lab Results   Component Value Date    PROTIME 13.4 05/10/2013    INR 1.2 05/10/2013     Recent Labs     06/16/19  1445   TROPONINI <0.01     Lab Results   Component Value Date    NITRU Negative 06/16/2019    COLORU Yellow 06/16/2019    PHUR 5.5 06/16/2019    WBCUA >20 06/16/2019    RBCUA 1-3 06/16/2019    RBCUA 0-1 05/10/2013    BACTERIA MANY 06/16/2019    CLARITYU CLOUDY 06/16/2019    SPECGRAV 1.025 06/16/2019    LEUKOCYTESUR MODERATE 06/16/2019    UROBILINOGEN 1.0 06/16/2019    BILIRUBINUR Negative 06/16/2019    BLOODU MODERATE 06/16/2019    GLUCOSEU 500 06/16/2019    KETUA 15 06/16/2019     No results found for: MG, PHOS  Lab Results   Component Value Date    LABA1C 5.8 05/20/2014     No results found for: TSH, LLEFMWQC02, FOLATE, FERRITIN, IRON, TIBC, PTRFSAT, TSH, FREET4  Lab Results   Component Value Date    TRIG 118 05/20/2014    HDL 58 05/20/2014    LDLCALC 81 05/20/2014    LABVLDL 24 05/20/2014     Lab Results   Component Value Date    LIPASE 29 06/16/2019     Lab Results   Component Value Date    BNP 63 01/03/2013     Lab Results   Component Value Date    LACTA 1.1 06/16/2019     No results found for: LITHIUM, DILFRTOT, VALPROATE  Lab Results   Component Value Date    PH 7.397 05/10/2013     Radiology  Xr Pelvis (1-2 Views)    Result Date: 6/8/2019  LOCATION: 200 COMPARISON: None HISTORY: Status post fall. TECHNIQUE: Single frontal view of the pelvis was obtained in supine position. FINDINGS:  Single frontal view of the pelvis shows no acute fracture or dislocation. No abnormal periosteal reaction. No abnormal osteolytic or osteoblastic lesion.  The visualized sacroiliac joints are symmetrical and unremarkable. The pubic symphysis joint shows small subchondral sclerosis at the articular surfaces. Femoral heads are normal in density and configuration bilaterally. The left hip joint space is preserved. Mild arthritic changes at the right hip joint. No significant soft tissue abnormality. 1. No acute traumatic bony injury radiographically. 2. Mild osteitis pubis. 3. 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 Xr Sacrum Coccyx (min 2 Views)    Result Date: 6/8/2019  Location 200 Sacrum and coccyx CLINICAL INDICATION: Status post fall No previous study for comparison. TECHNIQUE: 4 views of the sacrum and coccyx FINDINGS: No acute fracture or dislocation. Sacroiliac joints are symmetrical and unremarkable. Minimal contour irregularity with very small subchondral sclerosis at the articular surfaces of the pubic symphysis joint. No abnormal calcifications or mass in the presacral space. Moderate posterior facet joint degenerative changes at L5-S1 level. Moderate neural foraminal narrowing at L5-S1 level. 1. No acute traumatic bony injury radiographically. 2. Very mild osteitis pubis. 3. Moderate posterior facet joint degenerative changes at L5-S1 level. 4. Moderate neural foraminal narrowing at L5-S1 level. Ct Head Wo Contrast    Result Date: 6/16/2019  Reading location: 200 INDICATION: Altered mental status FINDINGS: Noncontrast CT images through the brain parenchyma. Automated dose control. Midline ventricles are mildly dilated. Moderate cortical atrophy. Extensive hypodensity in the deep cerebral white matter probably on the basis of small vessel ischemic disease. Well-defined, less than 1 cm hypodense foci in the right thalamus likely remote lacunar infarct. No focal cortical abnormality. No intracranial hemorrhage or mass effect or large extra-axial fluid collection.      No acute finding    Xr Chest Portable    Result Date: 6/16/2019  Location:200 Exam: XR

## 2019-06-16 NOTE — ED PROVIDER NOTES
Brought in for evaluation of generalized fatigue. Patient states that he has been excessively fatigued lately and is unable to ambulate. Patient is also hurting increased oral intake secondary to decreased appetite. Denies abdominal pain or nausea. Has been having occasional falls. He was seen here over a week ago for the same complaint. At that time he had a fall and was on the ground for 15 hours. He was too weak to get up. At that time family was insistent that if everything was normal they wanted to take him home. He was thoroughly evaluated and no injuries and was discharged home. They are now stating that they can not care for him at home right now. They want him to be set up for rehabilitation. He lives at home alone and they check on him frequently. Review of Systems   Constitutional: Positive for appetite change (decreased) and fatigue. Negative for chills, diaphoresis and fever. HENT: Negative for congestion. Eyes: Negative for visual disturbance. Respiratory: Negative for cough and shortness of breath. Cardiovascular: Negative for chest pain and leg swelling. Gastrointestinal: Negative for abdominal pain, diarrhea, nausea and vomiting. Genitourinary: Negative for difficulty urinating, frequency, hematuria and urgency. Musculoskeletal: Positive for gait problem. Negative for back pain. Skin: Negative for pallor and rash. Neurological: Positive for weakness (generalized). Negative for dizziness, light-headedness and headaches. Hematological: Bruises/bleeds easily. Psychiatric/Behavioral: Positive for confusion and hallucinations (Saw grandson at the window. The child was not really there. ). All other systems reviewed and are negative. Physical Exam   Constitutional: He is oriented to person, place, and time. He appears well-developed and well-nourished. No distress. Appears underweight   HENT:   Head: Normocephalic and atraumatic.    Eyes: Pupils are equal, round, and reactive to light. Conjunctivae and EOM are normal.   Neck: Normal range of motion. Neck supple. Cardiovascular: Normal rate, regular rhythm and normal heart sounds. No murmur heard. Pulmonary/Chest: Effort normal and breath sounds normal. No respiratory distress. He has no wheezes. He has no rales. Abdominal: Soft. Bowel sounds are normal. There is no tenderness. There is no rebound and no guarding. Musculoskeletal: He exhibits no edema, tenderness or deformity. Neurological: He is alert and oriented to person, place, and time. Sensation grossly intact. No focal neurological deficits. No ataxia with finger-to-nose. Skin: Skin is warm and dry. He is not diaphoretic. Nursing note and vitals reviewed. Procedures    MDM    EKG Interpretation    Interpreted by emergency department physician    Rhythm: normal sinus   Rate: normal  Axis: left  Ectopy: none  Conduction: right bundle branch block (incomplete)  ST Segments: no acute change  T Waves: no acute change  Q Waves: none    Clinical Impression: no acute changes    Meliza Hernandez          --------------------------------------------- PAST HISTORY ---------------------------------------------  Past Medical History:  has a past medical history of Diabetes mellitus ([address] and Hypertension. Past Surgical History:  has a past surgical history that includes Cardiac surgery. Social History:  reports that he has never smoked. He has never used smokeless tobacco. He reports that he does not drink alcohol or use drugs. Family History: family history is not on file. The patients home medications have been reviewed. Allergies: Patient has no known allergies.     -------------------------------------------------- RESULTS -------------------------------------------------    LABS:  Results for orders placed or performed during the hospital encounter of 06/16/19   CBC Auto Differential   Result Value Ref Range    WBC 7.6 4.5 Value Ref Range    Lactic Acid 1.1 0.5 - 2.2 mmol/L   Troponin   Result Value Ref Range    Troponin <0.01 0.00 - 0.03 ng/mL   Microscopic Urinalysis   Result Value Ref Range    WBC, UA >20 0 - 5 /HPF    RBC, UA 1-3 0 - 2 /HPF    Epi Cells FEW /HPF    Bacteria, UA MANY (A) /HPF   POCT Glucose   Result Value Ref Range    Glucose 222 mg/dL    QC OK? yes    POCT Glucose   Result Value Ref Range    Meter Glucose 222 (H) 74 - 99 mg/dL       RADIOLOGY:  CT Head WO Contrast   Final Result   No acute finding      XR CHEST PORTABLE   Final Result   No acute cardiopulmonary disease.              ------------------------- NURSING NOTES AND VITALS REVIEWED ---------------------------  Date / Time Roomed:  6/16/2019  1:42 PM  ED Bed Assignment:  03/03    The nursing notes within the ED encounter and vital signs as below have been reviewed. Patient Vitals for the past 24 hrs:   BP Temp Temp src Pulse Resp SpO2 Weight   06/16/19 1506 126/82 97.4 °F (36.3 °C) Oral 86 21 99 % --   06/16/19 1340 -- -- -- -- -- -- 116 lb 3.2 oz (52.7 kg)   06/16/19 1338 118/82 97.7 °F (36.5 °C) Oral 87 18 98 % --       Oxygen Saturation Interpretation: Normal    ------------------------------------------ PROGRESS NOTES ------------------------------------------  Re-evaluation(s):  Time: 1600  Patients symptoms show no change  Repeat physical examination is not changed    Counseling:  I have spoken with the patient and family and discussed todays results, in addition to providing specific details for the plan of care and counseling regarding the diagnosis and prognosis. Their questions are answered at this time and they are agreeable with the plan of admission.    --------------------------------- ADDITIONAL PROVIDER NOTES ---------------------------------  Consultations:  Time: 0290. Spoke with Dr. John Avelar. Discussed case. He will admit the patient.   This patient's ED course included: a personal history and physicial examination, re-evaluation prior to disposition, multiple bedside re-evaluations, IV medications, cardiac monitoring, continuous pulse oximetry and complex medical decision making and emergency management    This patient has remained hemodynamically stable during their ED course. Diagnosis:  1. Urinary tract infection without hematuria, site unspecified    2. Generalized weakness    3. Ambulatory dysfunction        Disposition:  Patient's disposition: Admit to med/surg floor  Patient's condition is fair.          Thony Butterfield DO  06/16/19 [id number]

## 2019-06-16 NOTE — ED NOTES
Bed: 04  Expected date:   Expected time:   Means of arrival:   Comments:     Fabiola Hernandez RN  06/16/19 1969

## 2019-06-17 ENCOUNTER — APPOINTMENT (OUTPATIENT)
Dept: GENERAL RADIOLOGY | Age: 79
DRG: 689 | End: 2019-06-17
Payer: COMMERCIAL

## 2019-06-17 PROBLEM — E43 SEVERE PROTEIN-CALORIE MALNUTRITION (HCC): Status: ACTIVE | Noted: 2019-06-17

## 2019-06-17 PROBLEM — R13.10 ODYNOPHAGIA: Status: ACTIVE | Noted: 2019-06-17

## 2019-06-17 LAB
ANION GAP SERPL CALCULATED.3IONS-SCNC: 20 MMOL/L (ref 7–16)
ANISOCYTOSIS: ABNORMAL
BASOPHILS ABSOLUTE: 0.17 E9/L (ref 0–0.2)
BASOPHILS RELATIVE PERCENT: 1.7 % (ref 0–2)
BUN BLDV-MCNC: 20 MG/DL (ref 8–23)
CALCIUM SERPL-MCNC: 8.9 MG/DL (ref 8.6–10.2)
CHLORIDE BLD-SCNC: 94 MMOL/L (ref 98–107)
CO2: 19 MMOL/L (ref 22–29)
CREAT SERPL-MCNC: 1 MG/DL (ref 0.7–1.2)
EOSINOPHILS ABSOLUTE: 0 E9/L (ref 0.05–0.5)
EOSINOPHILS RELATIVE PERCENT: 0 % (ref 0–6)
GFR AFRICAN AMERICAN: >60
GFR NON-AFRICAN AMERICAN: >60 ML/MIN/1.73
GLUCOSE BLD-MCNC: 276 MG/DL (ref 74–99)
HBA1C MFR BLD: 10.2 % (ref 4–5.6)
HCT VFR BLD CALC: 38.8 % (ref 37–54)
HEMOGLOBIN: 13.5 G/DL (ref 12.5–16.5)
INR BLD: 1.5
LYMPHOCYTES ABSOLUTE: 0.5 E9/L (ref 1.5–4)
LYMPHOCYTES RELATIVE PERCENT: 5.2 % (ref 20–42)
MCH RBC QN AUTO: 29.1 PG (ref 26–35)
MCHC RBC AUTO-ENTMCNC: 34.8 % (ref 32–34.5)
MCV RBC AUTO: 83.6 FL (ref 80–99.9)
METER GLUCOSE: 142 MG/DL (ref 74–99)
METER GLUCOSE: 165 MG/DL (ref 74–99)
METER GLUCOSE: 223 MG/DL (ref 74–99)
METER GLUCOSE: 232 MG/DL (ref 74–99)
METER GLUCOSE: 292 MG/DL (ref 74–99)
MONOCYTES ABSOLUTE: 0.4 E9/L (ref 0.1–0.95)
MONOCYTES RELATIVE PERCENT: 4.3 % (ref 2–12)
NEUTROPHILS ABSOLUTE: 8.81 E9/L (ref 1.8–7.3)
NEUTROPHILS RELATIVE PERCENT: 88.8 % (ref 43–80)
PDW BLD-RTO: 13.3 FL (ref 11.5–15)
PLATELET # BLD: 200 E9/L (ref 130–450)
PMV BLD AUTO: 10.7 FL (ref 7–12)
POLYCHROMASIA: ABNORMAL
POTASSIUM REFLEX MAGNESIUM: 4.6 MMOL/L (ref 3.5–5)
PROTHROMBIN TIME: 16.2 SEC (ref 9.3–12.4)
RBC # BLD: 4.64 E12/L (ref 3.8–5.8)
SODIUM BLD-SCNC: 133 MMOL/L (ref 132–146)
VITAMIN B-12: 475 PG/ML (ref 211–946)
WBC # BLD: 9.9 E9/L (ref 4.5–11.5)

## 2019-06-17 PROCEDURE — 97530 THERAPEUTIC ACTIVITIES: CPT

## 2019-06-17 PROCEDURE — 97161 PT EVAL LOW COMPLEX 20 MIN: CPT | Performed by: PHYSICAL THERAPIST

## 2019-06-17 PROCEDURE — 1200000000 HC SEMI PRIVATE

## 2019-06-17 PROCEDURE — 97166 OT EVAL MOD COMPLEX 45 MIN: CPT

## 2019-06-17 PROCEDURE — 6360000002 HC RX W HCPCS: Performed by: INTERNAL MEDICINE

## 2019-06-17 PROCEDURE — 82607 VITAMIN B-12: CPT

## 2019-06-17 PROCEDURE — 83036 HEMOGLOBIN GLYCOSYLATED A1C: CPT

## 2019-06-17 PROCEDURE — 80048 BASIC METABOLIC PNL TOTAL CA: CPT

## 2019-06-17 PROCEDURE — 6370000000 HC RX 637 (ALT 250 FOR IP): Performed by: INTERNAL MEDICINE

## 2019-06-17 PROCEDURE — 2580000003 HC RX 258: Performed by: INTERNAL MEDICINE

## 2019-06-17 PROCEDURE — 6370000000 HC RX 637 (ALT 250 FOR IP): Performed by: STUDENT IN AN ORGANIZED HEALTH CARE EDUCATION/TRAINING PROGRAM

## 2019-06-17 PROCEDURE — 82962 GLUCOSE BLOOD TEST: CPT

## 2019-06-17 PROCEDURE — 36415 COLL VENOUS BLD VENIPUNCTURE: CPT

## 2019-06-17 PROCEDURE — 97116 GAIT TRAINING THERAPY: CPT | Performed by: PHYSICAL THERAPIST

## 2019-06-17 PROCEDURE — 85025 COMPLETE CBC W/AUTO DIFF WBC: CPT

## 2019-06-17 PROCEDURE — BD11YZZ FLUOROSCOPY OF ESOPHAGUS USING OTHER CONTRAST: ICD-10-PCS | Performed by: INTERNAL MEDICINE

## 2019-06-17 PROCEDURE — 74220 X-RAY XM ESOPHAGUS 1CNTRST: CPT

## 2019-06-17 PROCEDURE — 85610 PROTHROMBIN TIME: CPT

## 2019-06-17 RX ORDER — INSULIN GLARGINE 100 [IU]/ML
10 INJECTION, SOLUTION SUBCUTANEOUS NIGHTLY
Status: DISCONTINUED | OUTPATIENT
Start: 2019-06-17 | End: 2019-06-19

## 2019-06-17 RX ORDER — SODIUM CHLORIDE 9 MG/ML
INJECTION, SOLUTION INTRAVENOUS CONTINUOUS
Status: DISCONTINUED | OUTPATIENT
Start: 2019-06-17 | End: 2019-06-17 | Stop reason: SDUPTHER

## 2019-06-17 RX ORDER — SODIUM CHLORIDE 9 MG/ML
INJECTION, SOLUTION INTRAVENOUS CONTINUOUS
Status: DISCONTINUED | OUTPATIENT
Start: 2019-06-17 | End: 2019-06-19

## 2019-06-17 RX ORDER — PANTOPRAZOLE SODIUM 40 MG/1
40 TABLET, DELAYED RELEASE ORAL
Status: DISCONTINUED | OUTPATIENT
Start: 2019-06-17 | End: 2019-06-21 | Stop reason: HOSPADM

## 2019-06-17 RX ORDER — LIDOCAINE HYDROCHLORIDE 20 MG/ML
15 SOLUTION OROPHARYNGEAL
Status: DISCONTINUED | OUTPATIENT
Start: 2019-06-17 | End: 2019-06-21 | Stop reason: HOSPADM

## 2019-06-17 RX ADMIN — SODIUM CHLORIDE: 9 INJECTION, SOLUTION INTRAVENOUS at 13:04

## 2019-06-17 RX ADMIN — INSULIN LISPRO 6 UNITS: 100 INJECTION, SOLUTION INTRAVENOUS; SUBCUTANEOUS at 13:04

## 2019-06-17 RX ADMIN — METFORMIN HYDROCHLORIDE 500 MG: 500 TABLET ORAL at 08:43

## 2019-06-17 RX ADMIN — ONDANSETRON 4 MG: 2 INJECTION INTRAMUSCULAR; INTRAVENOUS at 03:39

## 2019-06-17 RX ADMIN — INSULIN GLARGINE 10 UNITS: 100 INJECTION, SOLUTION SUBCUTANEOUS at 20:46

## 2019-06-17 RX ADMIN — RIVAROXABAN 10 MG: 10 TABLET, FILM COATED ORAL at 08:43

## 2019-06-17 RX ADMIN — METFORMIN HYDROCHLORIDE 500 MG: 500 TABLET ORAL at 16:53

## 2019-06-17 RX ADMIN — LIDOCAINE HYDROCHLORIDE 15 ML: 20 SOLUTION ORAL; TOPICAL at 20:46

## 2019-06-17 RX ADMIN — SODIUM CHLORIDE: 9 INJECTION, SOLUTION INTRAVENOUS at 20:45

## 2019-06-17 RX ADMIN — WATER 1 G: 1 INJECTION INTRAMUSCULAR; INTRAVENOUS; SUBCUTANEOUS at 16:53

## 2019-06-17 RX ADMIN — VITAMIN D, TAB 1000IU (100/BT) 1000 UNITS: 25 TAB at 08:43

## 2019-06-17 RX ADMIN — METOPROLOL SUCCINATE 25 MG: 25 TABLET, EXTENDED RELEASE ORAL at 08:43

## 2019-06-17 RX ADMIN — SIMVASTATIN 40 MG: 40 TABLET, FILM COATED ORAL at 20:46

## 2019-06-17 RX ADMIN — PANTOPRAZOLE SODIUM 40 MG: 40 TABLET, DELAYED RELEASE ORAL at 20:46

## 2019-06-17 RX ADMIN — FAMOTIDINE 20 MG: 20 TABLET, FILM COATED ORAL at 08:43

## 2019-06-17 ASSESSMENT — PAIN DESCRIPTION - LOCATION: LOCATION: CHEST

## 2019-06-17 ASSESSMENT — PAIN DESCRIPTION - PAIN TYPE: TYPE: ACUTE PAIN

## 2019-06-17 ASSESSMENT — PAIN SCALES - GENERAL
PAINLEVEL_OUTOF10: 0
PAINLEVEL_OUTOF10: 0
PAINLEVEL_OUTOF10: 3

## 2019-06-17 ASSESSMENT — PAIN - FUNCTIONAL ASSESSMENT: PAIN_FUNCTIONAL_ASSESSMENT: ACTIVITIES ARE NOT PREVENTED

## 2019-06-17 ASSESSMENT — PAIN DESCRIPTION - FREQUENCY: FREQUENCY: INTERMITTENT

## 2019-06-17 ASSESSMENT — PAIN DESCRIPTION - ORIENTATION: ORIENTATION: MID

## 2019-06-17 ASSESSMENT — PAIN DESCRIPTION - DESCRIPTORS: DESCRIPTORS: ACHING;DISCOMFORT

## 2019-06-17 NOTE — PROGRESS NOTES
Occupational Therapy  Occupational Therapy Initial Assessment    Date:2019  Patient Name: Carolynn Downs  MRN: [id number]  : 1940  ROOM #: [id number]    Placement Recommendation: Subacute    Equipment Prescriptions Needed: TBD at rehab    [facility]   AM-PAC Daily Activity Inpatient   How much help for putting on and taking off regular lower body clothing?: Total  How much help for Bathing?: A Lot  How much help for Toileting?: A Lot  How much help for putting on and taking off regular upper body clothing?: A Lot  How much help for taking care of personal grooming?: A Lot  How much help for eating meals?: None  AM-PAC Inpatient Daily Activity Raw Score: 13  AM-PAC Inpatient ADL T-Scale Score : 32.03  ADL Inpatient CMS 0-100% Score: 63.03  ADL Inpatient CMS G-Code Modifier : CL    Diagnosis:   1. Urinary tract infection without hematuria, site unspecified    2. Generalized weakness    3. Ambulatory dysfunction      Past Medical History:   Past Medical History:   Diagnosis Date    Cerebral artery occlusion with cerebral infarction [facility] [address]     Diabetes mellitus ([facility] [address] [facility]     Hyperlipidemia     Hypertension          The admitting diagnosis and active problem list as listed above have been reviewed prior to the initiation of this evaluation. Nursing cleared the patient for evaluation. Patient is agreeable to evaluation. Precautions: falls   Pain Scale: Numeric Rate: no pain; Nursing notified. Social history: with family : daughter      Home architecture: Apartment, 1 story, No steps to enter   PLOF: needs assistance with BADL and IADL, primarily stand pivots to and from scooter with supervision. Pt has an aide daily from 9-11am and 4-6pm. Daughter provides assistance when the aides are not there. Equipment owned: wheeled walker, cane, grab bars, hospital bed with trapeze, rollator, scooter, lift chair  Cognition: oriented x 4; follows 3 step directions.     good  Problem solving skills   good  Memory mobility, sitting balance at EOB for 8 minutes with intermittent minimal assist, transfer training with verbal cues for hand placement, assistance needed to hold urinal, 100cc of cloudy urine, nursing notified, static standing balance with wheeled walker, functional mobility with wheeled walker, verbal cues for walker sequence and safety, assistance for walker negotiation, posterior lean, maximal verbal cues for upright posture, pt up in bedside chair at end of eval. All needs within reach. Chair alarm on. Rehab Potential: good   Plan of care: Patient will be seen by OT 1-3 times a week for therapeutic activity, ADL re-training, bed mobility, functional transfers, functional mobility, safety and fall prevention, balance and endurance activities, instruction in energy conservation principles, and patient/family education. Patient and/or family understands diagnosis, prognosis, education and plan of care. Pt/family verbalized understanding.      Time Code treatment minutes: Jerrell OTR/L #[id number]

## 2019-06-17 NOTE — PLAN OF CARE
Problem: Falls - Risk of:  Goal: Will remain free from falls  Description  Will remain free from falls  6/17/2019 1325 by Ivy Manuel RN  Outcome: Met This Shift  Note:   Level 3 fall,bed alarm on,call light in reach. falls band on,yellow socks on.  6/17/2019 0748 by Jonny Law RN  Outcome: Met This Shift  Goal: Absence of physical injury  Description  Absence of physical injury  6/17/2019 1325 by Ivy Manuel RN  Outcome: Met This Shift  6/17/2019 0748 by Jonny Law RN  Outcome: Met This Shift     Problem: Risk for Impaired Skin Integrity  Goal: Tissue integrity - skin and mucous membranes  Description  Structural intactness and normal physiological function of skin and  mucous membranes.   Outcome: Met This Shift     Problem: Infection:  Goal: Will remain free from infection  Description  Will remain free from infection  Outcome: Met This Shift     Problem: Safety:  Goal: Free from accidental physical injury  Description  Free from accidental physical injury  6/17/2019 1325 by Ivy Manuel RN  Outcome: Met This Shift  6/17/2019 0748 by Jonny Law RN  Outcome: Met This Shift     Problem: Daily Care:  Goal: Daily care needs are met  Description  Daily care needs are met  6/17/2019 1325 by Ivy Manuel RN  Outcome: Met This Shift  6/17/2019 0748 by Jonny Law RN  Outcome: Met This Shift

## 2019-06-17 NOTE — PROGRESS NOTES
Nutrition Assessment    Type and Reason for Visit: Initial, Positive Nutrition Screen    Nutrition Recommendations: Continue current diet, Start ONS(Glucerna TID)    Nutrition Assessment: Pt severely malnourished on admit AEB weight loss and muscle/fat wasting. Pt at further nutritional risk r/t cognitive impairment r/t AMS d/t UTI, hx CVA and dementia AEB poor intakes. Will start glucerna TID for additional nutrition. Malnutrition Assessment:  · Malnutrition Status: Meets the criteria for severe malnutrition  · Context: Chronic illness  · Findings of the 6 clinical characteristics of malnutrition (Minimum of 2 out of 6 clinical characteristics is required to make the diagnosis of moderate or severe Protein Calorie Malnutrition based on AND/ASPEN Guidelines):  1. Energy Intake-Less than or equal to 75% of estimated energy requirement(Per pt family), Greater than or equal to 1 month(x2 mo Per pt family)    2. Weight Loss-10% loss or greater(12.7%), (x2 mo)  3. Fat Loss-Severe subcutaneous fat loss, Orbital, Fat overlying the ribs  4. Muscle Loss-Severe muscle mass loss, Scapula (trapezius), Temples (temporalis muscle), Clavicles (pectoralis and deltoids), Thigh (quadriceps)  5. Fluid Accumulation-No significant fluid accumulation   6.   Strength-Not measured    Nutrition Risk Level: High    Nutrient Needs:  · Estimated Daily Total Kcal: 2203-7480(MSJ REE 1216 x 1.3 SF)  · Estimated Daily Protein (g): 70-80(1.3-1.5 g pro/kg cbw)  · Estimated Daily Total Fluid (ml/day): 7703-0552(1 ml/kcal)    Nutrition Diagnosis:   · Problem: Severe malnutrition, In context of chronic illness  · Etiology: related to Cognitive or neurological impairment(r/t dementia, CVA hx, AMS for UTI)     Signs and symptoms:  as evidenced by Diet history of poor intake, Weight loss greater than or equal to 5% in 1 month, Severe muscle loss, Severe loss of subcutaneous fat(12.7% loss x 2 mo)    Objective Information:  · Nutrition-Focused Physical Findings: abd soft, n/v, poor appetite, +BS, no edema, +I/O, hyperglycemia (276), s/p fall as per pt, dementia noted and UTI   · Wound Type: None  · Current Nutrition Therapies:  · Oral Diet Orders: Carb Control 4 Carbs/Meal   · Oral Diet intake: 0%  · Oral Nutrition Supplement (ONS) Orders: None  · Anthropometric Measures:  · Ht: 5' 8\" (172.7 cm)   · Current Body Wt: 116 lb (52.6 kg)(6/17 bed scale)  · Admission Body Wt: 116 lb (52.6 kg)(6/16 no method)  · Usual Body Wt: 133 lb (60.3 kg)(4/16 actual per EMR hx)  · % Weight Change:  12.7% weight loss x2 mo   · Ideal Body Wt: 154 lb (69.9 kg), % Ideal Body 75%  · BMI Classification: BMI <18.5 Underweight    Nutrition Interventions:   Continued Inpatient Monitoring, Coordination of Care, Education Completed(Discussed importance of protein with each meal to prevent skin breakdown and muscle repair following PT)    Nutrition Evaluation:   · Evaluation: Goals set   · Goals: >50% meal trays/ONS    · Monitoring: Meal Intake, Supplement Intake, Diet Tolerance, Skin Integrity, I&O, Weight, Pertinent Labs, Mental Status/Confusion, Nausea or Vomiting, Monitor Bowel Function      Electronically signed by Zara Marin, MS, RD, LD on 6/17/19 at 2:01 PM    Contact Number: [phone]

## 2019-06-17 NOTE — PLAN OF CARE
Problem: Falls - Risk of:  Goal: Will remain free from falls  Description  Will remain free from falls  6/17/2019 1755 by Bob Up RN  Outcome: Met This Shift     Problem: Falls - Risk of:  Goal: Absence of physical injury  Description  Absence of physical injury  6/17/2019 1755 by Bob Up RN  Outcome: Met This Shift     Problem: Risk for Impaired Skin Integrity  Goal: Tissue integrity - skin and mucous membranes  Description  Structural intactness and normal physiological function of skin and  mucous membranes.   6/17/2019 1755 by Bob Up RN  Outcome: Met This Shift     Problem: Infection:  Goal: Will remain free from infection  Description  Will remain free from infection  6/17/2019 1755 by Bob Up RN  Outcome: Met This Shift     Problem: Safety:  Goal: Free from accidental physical injury  Description  Free from accidental physical injury  6/17/2019 1755 by Bob Up RN  Outcome: Met This Shift     Problem: Daily Care:  Goal: Daily care needs are met  Description  Daily care needs are met  6/17/2019 1755 by Bob Up RN  Outcome: Met This Shift

## 2019-06-17 NOTE — CARE COORDINATION
SS NOTE: Pt  was accepted to [facility]. SW advised pt's dtr Cristal Moreira. For the SNF pt will need a precert with insurnace, signed DENICE, current PT and OT arturo and SW completed the HENs. Vini Faulkner. 6/17/2019.3:03 PM,

## 2019-06-17 NOTE — PROGRESS NOTES
daughter  in an apartment  with 0 steps to enter. Patient's daughter was primary historian. Prior Level of Function: Patient dependent on  scooter  Equipment owned: Wheelchair, [address], [address] [facility] [employer] chair and Electric Scooter,  Karina Velazquez    Mentation: alert, cooperative, oriented x 3  and follows directions,     ROM: wfl   STRENGTH: Right Lower Extremity:3+/5 Left Lower Extremity: 3-/5  PAIN: (measured on a visual analog scale with 0=no pain and 10=excruciating pain) 0/10. FUNCTIONAL ASSESSMENT   Bed Mobility- Supine to sit- Moderate assist with assist of legs        Scooting- Minimal assist      Sit to supine- Not assessed      Transfers-Sit to stand- Moderate assist with anterior forward leans. Moderate Verbal and tactile cues needed to correct. Gait:  Patient ambulated 3 feet using wheeled walker with Moderate assist for anterior lean, walker control, and balance. Steps:  Not assessed     Balance: sit-fair       stand fair Patient presents with kyphotic standing posture and anterior lean. Edema: no  Endurance: fair     Treatment:   Therapist educated and facilitated patient on techniques to increase safety and independence with bed mobility, balance, functional transfers, and functional mobility. Sat EOB x 10 minutes to increase dynamic sitting balance and activity tolerance. Patient demonstrating good understanding of education/techniques, requiring additional training/education. At end of session, patient in chair with chair alarm on, call light and phone within reach,  all lines and tubes intact, nursing notified. Pt would benefit from continued skilled PT to increase functional independence and quality of life. Rehab Potential: good  for baseline  Patients Goal: home    ASSESSMENT  Patient exhibits decreased strength, balance, coordination impairing functional mobility. GOALS to be met in 2 days.    Bed mobility-  Minimal assist       Transfers-Sit to stand-Minimal assist

## 2019-06-17 NOTE — PROGRESS NOTES
in the last 72 hours. Invalid input(s): PT  Recent Labs     06/16/19  1445   TROPONINI <0.01         Ct Head Wo Contrast    Result Date: 6/16/2019  Reading location: 200 INDICATION: Altered mental status FINDINGS: Noncontrast CT images through the brain parenchyma. Automated dose control. Midline ventricles are mildly dilated. Moderate cortical atrophy. Extensive hypodensity in the deep cerebral white matter probably on the basis of small vessel ischemic disease. Well-defined, less than 1 cm hypodense foci in the right thalamus likely remote lacunar infarct. No focal cortical abnormality. No intracranial hemorrhage or mass effect or large extra-axial fluid collection. No acute finding    Xr Chest Portable    Result Date: 6/16/2019  Location:200 Exam: XR CHEST PORTABLE Comparison: 6/8/2019 History: Fatigue Findings: Portable AP upright chest. Heart size is normal. Pulmonary vessels are nondistended. No focal pulmonary parenchymal consolidation. No acute cardiopulmonary disease. Assessment:  Shari Elias is a 78y.o. year old male who presented on 6/16/2019 and is being treated for:  Principal Problem:    UTI (urinary tract infection)  Active Problems:    Cerebral artery occlusion with cerebral infarction ([address]    Weakness    Dementia    Severe protein-calorie malnutrition ([address]    Odynophagia  Resolved Problems:    * No resolved hospital problems. *    Plan  · Even on PPI still with pain - consult GI  · Otherwise continue abx/ivf and PT/OT  · Please see orders for further management and care.     Yasmany Ramey MD  12:38 PM  6/17/2019

## 2019-06-17 NOTE — CARE COORDINATION
SS NOTE: SW met with pt and his dtr, Blessing Mtz. Blessing Mtz reviewed the [employer] provider list and chose [facility]. SW completed a referral to [facility] and await their decision. For the SNF pt will need a precert with insurnace, signed DENICE, current PT and OT evals and SW will complete the HENs. Dennise Faulkner. 6/17/2019.12:07 PM,

## 2019-06-17 NOTE — CONSULTS
Consult to Neurology  Consult performed by: Nubia Barillas MD  Consult ordered by: Jon Cortez MD  Assessment/Recommendations: History Of Present Illness: The patient is a 78 y.o. male with a hx of cva and dementia who presents with worsening generalized weakness over the past few weeks. Pt was in the ED last week after a fall in which he reportedly was on the floor for 15 hours and was too weak to get up. At that time family wanted to take him home. Since then pt has been getting worse so they brought him back in and w/u in the ED found a symptomatic UTI which may be contributing to pts ambulatory issues. As above per Dr Rico Portal. The patient is a 78 y.o. male with significant past medical history of see above and below who presents with above. The patient has the following symptoms:    Change in level of consciousness: alert    New Weakness: yes    Numbness or Tingling: no    Difficulty Swallowing: no    Current Medications:   Scheduled Meds:metoprolol succinate, 25 mg, Oral, Daily  pantoprazole, 40 mg, Oral, QAM AC  famotidine, 20 mg, Oral, BID  rivaroxaban, 10 mg, Oral, Daily with breakfast  simvastatin, 40 mg, Oral, Nightly  vitamin D, 1,000 Units, Oral, Daily  sodium chloride flush, 10 mL, Intravenous, 2 times per day  insulin lispro, 0-12 Units, Subcutaneous, TID WC  insulin lispro, 0-6 Units, Subcutaneous, Nightly  cefTRIAXone (ROCEPHIN) IV, 1 g, Intravenous, Q24H  metFORMIN, 500 mg, Oral, BID WC      Continuous Infusions:dextrose      PRN Meds:acetaminophen, sodium chloride flush, magnesium hydroxide, ondansetron, glucose, dextrose, glucagon (rDNA), dextrose    Allergies:  Patient has no known allergies. Social History:   TOBACCO:   reports that he has never smoked. He has never used smokeless tobacco.  ETOH:   reports that he drinks alcohol.     Past Medical History:       No date: [facility]  No date: [facility]  No date:

## 2019-06-17 NOTE — DISCHARGE INSTR - COC
Continuity of Care Form    Patient Name: Tiffany Thornton   :  1940  MRN:  [id number]    Admit date:  2019  Discharge date:  2019    Code Status Order: Full Code   Advance Directives:   Advance Care Flowsheet Documentation     Date/Time Healthcare Directive Type of Healthcare Directive Copy in [address] Agent's Name Healthcare Agent's Phone Number    [phone]  Yes, patient has an advance directive for healthcare treatment  Durable power of  for health care  No, copy requested from family  Healthcare power of   daughter - Yasmine Vogt  --          Admitting Physician:  Jose Mace MD  PCP: Ruiz Beaulieu MD    Discharging Nurse: [email or web address] [facility] Unit/Room#: 0313/0313-02  Discharging Unit Phone Number: [phone]    Emergency Contact:   Extended Emergency Contact Information  Primary Emergency Contact: Mike Harris  Address: [address] Phone: [phone]  Mobile Phone: [phone]  Relation: Child   needed? No  Secondary Emergency Contact: delfina duarte  [place] Phone: [phone]  Relation: Child    Past Surgical History:  Past Surgical History:   Procedure Laterality Date    CARDIAC SURGERY         Immunization History: There is no immunization history on file for this patient.     Active Problems:  Patient Active Problem List   Diagnosis Code    UTI (urinary tract infection) N39.0    Cerebral artery occlusion with cerebral infarction (HCC) I63.50    Weakness R53.1    Dementia F03.90    Severe protein-calorie malnutrition (HCC) E43       Isolation/Infection:   Isolation          No Isolation            Nurse Assessment:  Last Vital Signs: /70   Pulse 105   Temp 97.7 °F (36.5 °C) (Oral)   Resp 18   Ht 5' 8\" (1.727 m)   Wt 116 lb (52.6 kg)   SpO2 94%   BMI 17.64 kg/m²     Last documented pain score (0-10 scale): Pain Level: 0  Last Weight:   Wt Readings from Last 1 Encounters:   06/17/19 116 lb (52.6 kg)     Mental Status:  alert    IV Access:  - None    Nursing Mobility/ADLs:  Walking   Assisted  Transfer  Assisted  Bathing  Assisted  Dressing  Assisted  Toileting  Assisted  Feeding  [address]  Independent  Med Delivery   whole    Wound Care Documentation and Therapy:        Elimination:  Continence:   · Bowel: Yes  · Bladder: Yes  Urinary Catheter: None   Colostomy/Ileostomy/Ileal Conduit: No       Date of Last BM: 6/19/2019    Intake/Output Summary (Last 24 hours) at 6/17/2019 1504  Last data filed at 6/17/2019 1304  Gross per 24 hour   Intake 1170 ml   Output 200 ml   Net 970 ml     I/O last 3 completed shifts: In: 2165 [P.O.:120; I.V.:1050]  Out: 200 [Urine:100; Emesis/NG output:100]    Safety Concerns: At Risk for Falls and History of Seizures    Impairments/Disabilities:      None    Nutrition Therapy:  Current Nutrition Therapy:   - Oral Diet:  General    Routes of Feeding: Oral  Liquids: No Restrictions  Daily Fluid Restriction: no  Last Modified Barium Swallow with Video (Video Swallowing Test): not done    Treatments at the Time of Hospital Discharge:   Respiratory Treatments: ***  Oxygen Therapy:  is not on home oxygen therapy.   Ventilator:    - No ventilator support    Rehab Therapies: Physical Therapy and Occupational Therapy  Weight Bearing Status/Restrictions: No weight bearing restirctions  Other Medical Equipment (for information only, NOT a DME order):  walker  Other Treatments: ***    Patient's personal belongings (please select all that are sent with patient):  Glasses    RN SIGNATURE:  Electronically signed by [facility], RN on 6/20/19 at 1:25 PM    CASE MANAGEMENT/SOCIAL WORK SECTION    Inpatient Status Date: ***    Readmission Risk Assessment Score:  Readmission Risk              Risk of Unplanned Readmission:        12           Discharging to Facility/ Agency   · Name: [facility]  · Address:  · Phone: [phone]  · [phone]    Dialysis Facility (if applicable)   · Name:  · Address:  · Dialysis Schedule:  · Phone:  · Fax:    / signature: Electronically signed by Roshan Putnam on 6/17/19 at 3:06 PM    PHYSICIAN SECTION    Prognosis: Good    Condition at Discharge: Stable    Rehab Potential (if transferring to Rehab): Good    Recommended Labs or Other Treatments After Discharge: ***    Physician Certification: I certify the above information and transfer of Eliud Kingsley  is necessary for the continuing treatment of the diagnosis listed and that he requires [facility] for less 30 days.      Update Admission H&P: {CHP DME Changes in BFKQC:[id number]}    PHYSICIAN SIGNATURE:  {Esignature:[id number]}

## 2019-06-17 NOTE — CONSULTS
pain.   Musculoskeletal: Denies muscle or joint pain, stiffness, arthritis, gout, or instability. Dermatology / Skin: Denies any rashes, ulcers, or excoriations. Denies bruising. Neurology: Denies any bowel or bladder incontinence, headache or focal neurological deficits. No weakness or paresthesia. Denies numbness or tingling in the hands or feet. Psychiatric: Denies nervousness/anxiety, depression or memory loss. Past Medical History:  Past Medical History:   Diagnosis Date    [facility]bral [facility] occlusion with [facility] ([facility]     [facility]     Hyperlipidemia     Hypertension        Past Surgical History:  Past Surgical History:   Procedure Laterality Date    CARDIAC SURGERY         Home Medications:  Prior to Admission medications    Medication Sig Start Date End Date Taking? Authorizing Provider   ranitidine (ZANTAC) 150 MG tablet Take 150 mg by mouth 2 times daily   Yes Historical Provider, MD   omeprazole (PRILOSEC) 40 MG delayed release capsule Take 40 mg by mouth daily   Yes Historical Provider, MD   vitamin D (CHOLECALCIFEROL) 1000 UNIT TABS tablet Take 1,000 Units by mouth daily   Yes Historical Provider, MD   rivaroxaban (XARELTO) 10 MG TABS tablet Take 10 mg by mouth daily (with breakfast)   Yes Historical Provider, MD   metoprolol succinate (TOPROL XL) 25 MG extended release tablet Take 25 mg by mouth daily   Yes Historical Provider, MD   simvastatin (ZOCOR) 40 MG tablet Take 40 mg by mouth nightly. Yes Historical Provider, MD   metformin (GLUCOPHAGE) 500 MG tablet Take 1,000 mg by mouth 2 times daily (with meals). Yes Historical Provider, MD       Allergies: Patient has no known allergies.     Social History:  Social History     Socioeconomic History    Marital status:      Spouse name: Not on file    Number of children: Not on file    Years of education: Not on file    Highest education level: Not on file   Occupational History    Not on file   Social Needs    Financial resource strain: Not on file    Food insecurity:     Worry: Not on file     Inability: Not on file    Transportation needs:     Medical: Not on file     Non-medical: Not on file   Tobacco Use    Smoking status: Never Smoker    Smokeless tobacco: Never Used   Substance and Sexual Activity    Alcohol use: Yes     Comment: occasionally a couple beers    Drug use: No    Sexual activity: Not on file   Lifestyle    Physical activity:     Days per week: Not on file     Minutes per session: Not on file    Stress: Not on file   Relationships    Social connections:     Talks on phone: Not on file     Gets together: Not on file     Attends Denominational service: Not on file     Active member of club or organization: Not on file     Attends meetings of clubs or organizations: Not on file     Relationship status: Not on file    Intimate partner violence:     Fear of current or ex partner: Not on file     Emotionally abused: Not on file     Physically abused: Not on file     Forced sexual activity: Not on file   Other Topics Concern    Not on file   Social History Narrative    Not on file       Family History:  History reviewed. No pertinent family history. PHYSICAL EXAM:  Vital Signs: /70   Pulse 105   Temp 97.7 °F (36.5 °C) (Oral)   Resp 18   Ht 5' 8\" (1.727 m)   Wt 116 lb (52.6 kg)   SpO2 94%   BMI 17.64 kg/m²   GENERAL APPEARANCE:  awake, alert, oriented, cooperative, and in no acute distress, thin and frail in appearance  EYES:  Lids and lashes normal, PERRLA, EOMI, sclera clear, conjunctiva normal  HENT:  Normocephalic, without obvious abnormality, atramatic, oral pharynx with moist mucus membranes, tonsils without erythema or exudates  NECK:  Supple with no carotid bruits, JVD or thyromegaly. No cervical adenopathy  LUNGS:  Clear to auscultation bilaterally with no wheezes, rales or rhonchi. No increased work of breathing, good air exchange.   CARDIOVASCULAR: Regular rate and rhythm, no murmur  ABDOMEN:  normal bowel sounds in all 4 quadrants, soft, non-distended, non-tender, no masses palpated, no hepatosplenomegally  MUSCULOSKELETAL:  There is no redness, warmth, or swelling of the joints. Full range of motion noted. Motor strength is 5 out of 5 all extremities bilaterally. Tone is normal. Muscle bulk is decreased  EXTREMITIES: No edema, 2+ pulses bilaterally (radial and dorsalis pedis)  NEUROLOGIC:  Awake, alert, oriented to name, place and time. Cranial nerves II-XII are grossly intact. Motor is 5 out of 5 bilaterally. SKIN: Normal skin color, texture, and turgor. There is no redness, warmth, or swelling. No bruising or bleeding, no mottling. PSYCH: Affect, behavior and insight are all within normal limits.       DATA:  Results for orders placed or performed during the hospital encounter of 06/16/19   Urine Culture   Result Value Ref Range    Urine Culture, Routine      Organism Gram negative brooks (A)     Urine Culture, Routine       >100,000 CFU/ml  Identification and sensitivity to follow     CBC Auto Differential   Result Value Ref Range    WBC 7.6 4.5 - 11.5 E9/L    RBC 4.78 3.80 - 5.80 E12/L    Hemoglobin 13.8 12.5 - 16.5 g/dL    Hematocrit 39.8 37.0 - 54.0 %    MCV 83.3 80.0 - 99.9 fL    MCH 28.9 26.0 - 35.0 pg    MCHC 34.7 (H) 32.0 - 34.5 %    RDW 13.2 11.5 - 15.0 fL    Platelets 379 006 - 106 E9/L    MPV 10.7 7.0 - 12.0 fL    Neutrophils % 84.7 (H) 43.0 - 80.0 %    Immature Granulocytes % 1.2 0.0 - 5.0 %    Lymphocytes % 8.4 (L) 20.0 - 42.0 %    Monocytes % 5.4 2.0 - 12.0 %    Eosinophils % 0.0 0.0 - 6.0 %    Basophils % 0.3 0.0 - 2.0 %    Neutrophils # 6.48 1.80 - 7.30 E9/L    Immature Granulocytes # 0.09 E9/L    Lymphocytes # 0.64 (L) 1.50 - 4.00 E9/L    Monocytes # 0.41 0.10 - 0.95 E9/L    Eosinophils # 0.00 (L) 0.05 - 0.50 E9/L    Basophils # 0.02 0.00 - 0.20 E9/L   Comprehensive Metabolic Panel w/ Reflex to MG   Result Value Ref Range    Sodium 131 (L) 132 - 146 mmol/L    Potassium reflex Magnesium 4.8 3.5 - 5.0 mmol/L    Chloride 98 98 - 107 mmol/L    CO2 23 22 - 29 mmol/L    Anion Gap 10 7 - 16 mmol/L    Glucose 251 (H) 74 - 99 mg/dL    BUN 22 8 - 23 mg/dL    CREATININE 1.0 0.7 - 1.2 mg/dL    GFR Non-African American >60 >=60 mL/min/1.73    GFR African American >60     Calcium 9.4 8.6 - 10.2 mg/dL    Total Protein 7.1 6.4 - 8.3 g/dL    Alb 3.4 (L) 3.5 - 5.2 g/dL    Total Bilirubin 0.8 0.0 - 1.2 mg/dL    Alkaline Phosphatase 107 40 - 129 U/L    ALT 18 0 - 40 U/L    AST 17 0 - 39 U/L   Lipase   Result Value Ref Range    Lipase 29 13 - 60 U/L   Urinalysis   Result Value Ref Range    Color, UA Yellow Straw/Yellow    Clarity, UA CLOUDY (A) Clear    Glucose, Ur 500 (A) Negative mg/dL    Bilirubin Urine Negative Negative    Ketones, Urine 15 (A) Negative mg/dL    Specific Gravity, UA 1.025 1.005 - 1.030    Blood, Urine MODERATE (A) Negative    pH, UA 5.5 5.0 - 9.0    Protein, UA 30 (A) Negative mg/dL    Urobilinogen, Urine 1.0 <2.0 E.U./dL    Nitrite, Urine Negative Negative    Leukocyte Esterase, Urine MODERATE (A) Negative   Lactic Acid, Plasma   Result Value Ref Range    Lactic Acid 1.1 0.5 - 2.2 mmol/L   Troponin   Result Value Ref Range    Troponin <0.01 0.00 - 0.03 ng/mL   Microscopic Urinalysis   Result Value Ref Range    WBC, UA >20 0 - 5 /HPF    RBC, UA 1-3 0 - 2 /HPF    Epi Cells FEW /HPF    Bacteria, UA MANY (A) /HPF   CBC auto differential   Result Value Ref Range    WBC 9.9 4.5 - 11.5 E9/L    RBC 4.64 3.80 - 5.80 E12/L    Hemoglobin 13.5 12.5 - 16.5 g/dL    Hematocrit 38.8 37.0 - 54.0 %    MCV 83.6 80.0 - 99.9 fL    MCH 29.1 26.0 - 35.0 pg    MCHC 34.8 (H) 32.0 - 34.5 %    RDW 13.3 11.5 - 15.0 fL    Platelets 253 745 - 861 E9/L    MPV 10.7 7.0 - 12.0 fL    Neutrophils % 88.8 (H) 43.0 - 80.0 %    Lymphocytes % 5.2 (L) 20.0 - 42.0 %    Monocytes % 4.3 2.0 - 12.0 %    Eosinophils % 0.0 0.0 - 6.0 %    Basophils % 1.7 0.0 - 2.0 %    Neutrophils # 8.81 (H) 1.80 - 7.30 E9/L    Lymphocytes # 0.50 (L) 1.50 - 4.00 E9/L    Monocytes # 0.40 0.10 - 0.95 E9/L    Eosinophils # 0.00 (L) 0.05 - 0.50 E9/L    Basophils # 0.17 0.00 - 0.20 E9/L    Anisocytosis 1+     Polychromasia 1+    Hemoglobin A1c   Result Value Ref Range    Hemoglobin A1C 10.2 (H) 4.0 - 5.6 %   Basic Metabolic Panel w/ Reflex to MG   Result Value Ref Range    Sodium 133 132 - 146 mmol/L    Potassium reflex Magnesium 4.6 3.5 - 5.0 mmol/L    Chloride 94 (L) 98 - 107 mmol/L    CO2 19 (L) 22 - 29 mmol/L    Anion Gap 20 (H) 7 - 16 mmol/L    Glucose 276 (H) 74 - 99 mg/dL    BUN 20 8 - 23 mg/dL    CREATININE 1.0 0.7 - 1.2 mg/dL    GFR Non-African American >60 >=60 mL/min/1.73    GFR African American >60     Calcium 8.9 8.6 - 10.2 mg/dL   POCT Glucose   Result Value Ref Range    Glucose 222 mg/dL    QC OK? yes    POCT Glucose   Result Value Ref Range    Meter Glucose 222 (H) 74 - 99 mg/dL   POCT Glucose   Result Value Ref Range    Meter Glucose 192 (H) 74 - 99 mg/dL   POCT Glucose   Result Value Ref Range    Meter Glucose 223 (H) 74 - 99 mg/dL   POCT Glucose   Result Value Ref Range    Meter Glucose 232 (H) 74 - 99 mg/dL   POCT Glucose   Result Value Ref Range    Meter Glucose 292 (H) 74 - 99 mg/dL   POCT Glucose   Result Value Ref Range    Meter Glucose 142 (H) 74 - 99 mg/dL         IMAGING:  Xr Pelvis (1-2 Views)    Result Date: 6/8/2019  LOCATION: [facility] [place][note 3] COMPARISON: None HISTORY: Status post fall. TECHNIQUE: Single frontal view of the pelvis was obtained in supine position. FINDINGS:  Single frontal view of the pelvis shows no acute fracture or dislocation. No abnormal periosteal reaction. No abnormal osteolytic or osteoblastic lesion. The visualized sacroiliac joints are symmetrical and unremarkable. The pubic symphysis joint shows small subchondral sclerosis at the articular surfaces. Femoral heads are normal in density and configuration bilaterally. The left hip joint space is preserved.  Mild arthritic changes at the right hip joint. No significant soft tissue abnormality. 1. No acute traumatic bony injury radiographically. 2. Mild osteitis pubis. 3. 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 Xr Sacrum Coccyx (min 2 Views)    Result Date: 6/8/2019  Location 200 Sacrum and coccyx CLINICAL INDICATION: Status post fall No previous study for comparison. TECHNIQUE: 4 views of the sacrum and coccyx FINDINGS: No acute fracture or dislocation. Sacroiliac joints are symmetrical and unremarkable. Minimal contour irregularity with very small subchondral sclerosis at the articular surfaces of the pubic symphysis joint. No abnormal calcifications or mass in the presacral space. Moderate posterior facet joint degenerative changes at L5-S1 level. Moderate neural foraminal narrowing at L5-S1 level. 1. No acute traumatic bony injury radiographically. 2. Very mild osteitis pubis. 3. Moderate posterior facet joint degenerative changes at L5-S1 level. 4. Moderate neural foraminal narrowing at L5-S1 level. Ct Head Wo Contrast    Result Date: 6/16/2019  Reading location: 200 INDICATION: Altered mental status FINDINGS: Noncontrast CT images through the brain parenchyma. Automated dose control. Midline ventricles are mildly dilated. Moderate cortical atrophy. Extensive hypodensity in the deep cerebral white matter probably on the basis of small vessel ischemic disease. Well-defined, less than 1 cm hypodense foci in the right thalamus likely remote lacunar infarct. No focal cortical abnormality. No intracranial hemorrhage or mass effect or large extra-axial fluid collection. No acute finding    Fl Esophagram    Result Date: 6/17/2019  Reading location 200 Single contrast esophagram HISTORY: Epigastric pain and hiccups Radiographic fluoroscopic evaluation. Fluoroscopy time 1 minute. 11 images. FINDINGS: Esophagus is normal caliber and course. Mucosal pattern is normal. Small hiatal hernia.      Hiatal hernia    Xr Chest Portable    Result Date:

## 2019-06-18 ENCOUNTER — ANESTHESIA EVENT (OUTPATIENT)
Dept: ENDOSCOPY | Age: 79
DRG: 689 | End: 2019-06-18
Payer: COMMERCIAL

## 2019-06-18 LAB
METER GLUCOSE: 101 MG/DL (ref 74–99)
METER GLUCOSE: 106 MG/DL (ref 74–99)
METER GLUCOSE: 115 MG/DL (ref 74–99)
METER GLUCOSE: 144 MG/DL (ref 74–99)
METER GLUCOSE: 97 MG/DL (ref 74–99)
ORGANISM: ABNORMAL
URINE CULTURE, ROUTINE: ABNORMAL
URINE CULTURE, ROUTINE: ABNORMAL

## 2019-06-18 PROCEDURE — 6370000000 HC RX 637 (ALT 250 FOR IP): Performed by: INTERNAL MEDICINE

## 2019-06-18 PROCEDURE — 6360000002 HC RX W HCPCS: Performed by: INTERNAL MEDICINE

## 2019-06-18 PROCEDURE — 92610 EVALUATE SWALLOWING FUNCTION: CPT | Performed by: SPEECH-LANGUAGE PATHOLOGIST

## 2019-06-18 PROCEDURE — 82962 GLUCOSE BLOOD TEST: CPT

## 2019-06-18 PROCEDURE — 6370000000 HC RX 637 (ALT 250 FOR IP): Performed by: STUDENT IN AN ORGANIZED HEALTH CARE EDUCATION/TRAINING PROGRAM

## 2019-06-18 PROCEDURE — 1200000000 HC SEMI PRIVATE

## 2019-06-18 PROCEDURE — 2580000003 HC RX 258: Performed by: INTERNAL MEDICINE

## 2019-06-18 RX ORDER — PETROLATUM 430 MG/G
OINTMENT TOPICAL PRN
Status: DISCONTINUED | OUTPATIENT
Start: 2019-06-18 | End: 2019-06-21 | Stop reason: HOSPADM

## 2019-06-18 RX ADMIN — VITAMIN D, TAB 1000IU (100/BT) 1000 UNITS: 25 TAB at 08:35

## 2019-06-18 RX ADMIN — INSULIN GLARGINE 10 UNITS: 100 INJECTION, SOLUTION SUBCUTANEOUS at 21:40

## 2019-06-18 RX ADMIN — SIMVASTATIN 40 MG: 40 TABLET, FILM COATED ORAL at 21:40

## 2019-06-18 RX ADMIN — METOPROLOL SUCCINATE 25 MG: 25 TABLET, EXTENDED RELEASE ORAL at 08:35

## 2019-06-18 RX ADMIN — INSULIN LISPRO 2 UNITS: 100 INJECTION, SOLUTION INTRAVENOUS; SUBCUTANEOUS at 08:34

## 2019-06-18 RX ADMIN — METFORMIN HYDROCHLORIDE 500 MG: 500 TABLET ORAL at 08:35

## 2019-06-18 RX ADMIN — PANTOPRAZOLE SODIUM 40 MG: 40 TABLET, DELAYED RELEASE ORAL at 06:53

## 2019-06-18 RX ADMIN — WATER 1 G: 1 INJECTION INTRAMUSCULAR; INTRAVENOUS; SUBCUTANEOUS at 17:37

## 2019-06-18 RX ADMIN — ACETAMINOPHEN 650 MG: 325 TABLET, FILM COATED ORAL at 21:40

## 2019-06-18 RX ADMIN — METFORMIN HYDROCHLORIDE 500 MG: 500 TABLET ORAL at 17:37

## 2019-06-18 RX ADMIN — PANTOPRAZOLE SODIUM 40 MG: 40 TABLET, DELAYED RELEASE ORAL at 17:37

## 2019-06-18 RX ADMIN — PETROLATUM: 430 OINTMENT TOPICAL at 21:43

## 2019-06-18 ASSESSMENT — PAIN DESCRIPTION - DESCRIPTORS: DESCRIPTORS: HEADACHE

## 2019-06-18 ASSESSMENT — PAIN SCALES - GENERAL
PAINLEVEL_OUTOF10: 0
PAINLEVEL_OUTOF10: 3
PAINLEVEL_OUTOF10: 0
PAINLEVEL_OUTOF10: 0

## 2019-06-18 ASSESSMENT — PAIN DESCRIPTION - LOCATION: LOCATION: HEAD;CHEST

## 2019-06-18 ASSESSMENT — PAIN DESCRIPTION - PAIN TYPE: TYPE: ACUTE PAIN

## 2019-06-18 NOTE — PLAN OF CARE
Problem: Falls - Risk of:  Goal: Will remain free from falls  Description  Will remain free from falls  Outcome: Met This Shift  Goal: Absence of physical injury  Description  Absence of physical injury  Outcome: Met This Shift     Problem: Risk for Impaired Skin Integrity  Goal: Tissue integrity - skin and mucous membranes  Description  Structural intactness and normal physiological function of skin and  mucous membranes.   Outcome: Met This Shift     Problem: Infection:  Goal: Will remain free from infection  Description  Will remain free from infection  Outcome: Met This Shift     Problem: Safety:  Goal: Free from accidental physical injury  Description  Free from accidental physical injury  Outcome: Met This Shift     Problem: Daily Care:  Goal: Daily care needs are met  Description  Daily care needs are met  Outcome: Met This Shift     Problem: Pain:  Goal: Pain level will decrease  Description  Pain level will decrease  Outcome: Met This Shift  Goal: Control of acute pain  Description  Control of acute pain  Outcome: Met This Shift

## 2019-06-18 NOTE — ANESTHESIA PRE PROCEDURE
Department of Anesthesiology  Preprocedure Note       Name:  Adryan Brown   Age:  78 y.o.  :  1940                                          MRN:  [id number]         Date:  2019      Surgeon: Garima Owens):  Radha Alvarado MD    Procedure: EGD ESOPHAGOGASTRODUODENOSCOPY (N/A )    Medications prior to admission:   Prior to Admission medications    Medication Sig Start Date End Date Taking? Authorizing Provider   ranitidine (ZANTAC) 150 MG tablet Take 150 mg by mouth 2 times daily   Yes Historical Provider, MD   omeprazole (PRILOSEC) 40 MG delayed release capsule Take 40 mg by mouth daily   Yes Historical Provider, MD   vitamin D (CHOLECALCIFEROL) 1000 UNIT TABS tablet Take 1,000 Units by mouth daily   Yes Historical Provider, MD   rivaroxaban (XARELTO) 10 MG TABS tablet Take 10 mg by mouth daily (with breakfast)   Yes Historical Provider, MD   metoprolol succinate (TOPROL XL) 25 MG extended release tablet Take 25 mg by mouth daily   Yes Historical Provider, MD   simvastatin (ZOCOR) 40 MG tablet Take 40 mg by mouth nightly. Yes Historical Provider, MD   metformin (GLUCOPHAGE) 500 MG tablet Take 1,000 mg by mouth 2 times daily (with meals).    Yes Historical Provider, MD       Current medications:    Current Facility-Administered Medications   Medication Dose Route Frequency Provider Last Rate Last Dose    ALOE VESTA PROTECTIVE ointment OINT   Topical PRN Jon Cortez MD        insulin glargine (LANTUS) injection vial 10 Units  10 Units Subcutaneous Nightly Jon Cortez MD   10 Units at 19    0.9 % sodium chloride infusion   Intravenous Continuous Jon Cortez MD 75 mL/hr at 19      pantoprazole (PROTONIX) tablet 40 mg  40 mg Oral BID AC Bob Parra, DO   40 mg at 19 173    lidocaine viscous hcl (XYLOCAINE) 2 % solution 15 mL  15 mL Mouth/Throat Q3H PRN Bob Parra DO   15 mL at 19    acetaminophen (TYLENOL) tablet 650 mg  650 mg Oral Q4H PRN Jorge Vance Garry López MD        metoprolol succinate (TOPROL XL) extended release tablet 25 mg  25 mg Oral Daily Joni Motley MD   25 mg at 06/18/19 0835    [Held by provider] rivaroxaban (XARELTO) tablet 10 mg  10 mg Oral Daily with breakfast Joni Motley MD   Stopped at 06/18/19 0800    simvastatin (ZOCOR) tablet 40 mg  40 mg Oral Nightly Joni Motley MD   40 mg at 06/17/19 2046    vitamin D (CHOLECALCIFEROL) tablet 1,000 Units  1,000 Units Oral Daily Joni Motley MD   1,000 Units at 06/18/19 0835    sodium chloride flush 0.9 % injection 10 mL  10 mL Intravenous 2 times per day Joni Motley MD        sodium chloride flush 0.9 % injection 10 mL  10 mL Intravenous PRN Joni Motley MD        magnesium hydroxide (MILK OF MAGNESIA) 400 MG/5ML suspension 30 mL  30 mL Oral Daily PRN Joni Motley MD        ondansetron [facility]) injection 4 mg  4 mg Intravenous Q6H PRN Joni Motley MD   4 mg at 06/17/19 0339    insulin lispro (HUMALOG) injection vial 0-12 Units  0-12 Units Subcutaneous TID  Jnoi Motley MD   2 Units at 06/18/19 0834    insulin lispro (HUMALOG) injection vial 0-6 Units  0-6 Units Subcutaneous Nightly Joni Motley MD   Stopped at 06/17/19 2044    glucose (GLUTOSE) 40 % oral gel 15 g  15 g Oral PRN Joni Motley MD        dextrose 50 % IV solution  12.5 g Intravenous PRN Joni Motley MD        glucagon (rDNA) injection 1 mg  1 mg Intramuscular PRN Joni Motley MD        dextrose 5 % solution  100 mL/hr Intravenous PRN Joni Motley MD        cefTRIAXone (ROCEPHIN) 1 g in sterile water 10 mL IV syringe  1 g Intravenous Q24H Joni Motley MD   1 g at 06/18/19 1737    metFORMIN (GLUCOPHAGE) tablet 500 mg  500 mg Oral BID  Joni Motley MD   500 mg at 06/18/19 1737       Allergies:  No Known Allergies    Problem List:    Patient Active Problem List   Diagnosis Code    UTI (urinary tract infection) N39.0    Cerebral artery occlusion with cerebral infarction ([facility] [address] I63.50    Weakness R53.1    Dementia F03.90    Severe protein-calorie malnutrition (HCC) E43    Odynophagia R13.10       Past Medical History:        Diagnosis Date    Cerebral artery occlusion with cerebral infarction ([facility]     Diabetes mellitus ([facility]     Hyperlipidemia     Hypertension        Past Surgical History:        Procedure Laterality Date    CARDIAC SURGERY         Social History:    Social History     Tobacco Use    Smoking status: Never Smoker    Smokeless tobacco: Never Used   Substance Use Topics    Alcohol use: Yes     Comment: occasionally a couple beers                                Counseling given: Not Answered      Vital Signs (Current):   Vitals:    06/17/19 1630 06/17/19 1930 06/18/19 0000 06/18/19 0725   BP:  (!) 92/55  101/60   Pulse: 75 80  86   Resp:  16  14   Temp:  97.5 °F (36.4 °C)  97.8 °F (36.6 °C)   TempSrc:  Oral  Oral   SpO2: 97% 97% 93% 95%   Weight:       Height:                                                  BP Readings from Last 3 Encounters:   06/18/19 101/60   06/08/19 (!) 179/78   04/06/19 (!) 151/65       NPO Status:                                                                                 BMI:   Wt Readings from Last 3 Encounters:   06/17/19 116 lb (52.6 kg)   06/08/19 122 lb (55.3 kg)   04/06/19 133 lb (60.3 kg)     Body mass index is 17.64 kg/m².     CBC:   Lab Results   Component Value Date    WBC 9.9 06/17/2019    RBC 4.64 06/17/2019    HGB 13.5 06/17/2019    HCT 38.8 06/17/2019    MCV 83.6 06/17/2019    RDW 13.3 06/17/2019     06/17/2019       CMP:   Lab Results   Component Value Date     06/17/2019    K 4.6 06/17/2019    CL 94 06/17/2019    CO2 19 06/17/2019    BUN 20 06/17/2019    CREATININE 1.0 06/17/2019    GFRAA >60 06/17/2019    LABGLOM >60 06/17/2019    GLUCOSE 276 06/17/2019    GLUCOSE 159 03/07/2011    PROT 7.1 06/16/2019    CALCIUM 8.9 06/17/2019    BILITOT 0.8 06/16/2019    ALKPHOS 107 06/16/2019    AST 17 06/16/2019    ALT 18 06/16/2019       POC Tests: No results for input(s): POCGLU, POCNA, POCK, POCCL, POCBUN, POCHEMO, POCHCT in the last 72 hours. Coags:   Lab Results   Component Value Date    PROTIME 16.2 06/17/2019    INR 1.5 06/17/2019    APTT 26.4 05/10/2013       HCG (If Applicable): No results found for: PREGTESTUR, PREGSERUM, HCG, HCGQUANT     ABGs: No results found for: PHART, PO2ART, HTL1NWL, PQL4FTO, BEART, E2BMTNFK     Type & Screen (If Applicable):  No results found for: [facility]    Anesthesia Evaluation  Patient summary reviewed  Airway:         Dental:          Pulmonary:Negative Pulmonary ROS                              Cardiovascular:    (+) hypertension:, hyperlipidemia      ECG reviewed               Beta Blocker:  Dose within 24 Hrs      ROS comment: EKG: Normal Sinus Rhythm 70, incomplete Rt Bundle Branch Block. Neuro/Psych:   (+) CVA:, psychiatric history:            GI/Hepatic/Renal: Neg GI/Hepatic/Renal ROS            Endo/Other:    (+) DiabetesType II DM, , .                 Abdominal:           Vascular: negative vascular ROS. Anesthesia Plan      MAC     ASA 3       Induction: intravenous. Anesthetic plan and risks discussed with patient. Plan discussed with CRNA.                   Ambika Solis MD   6/18/2019    Electronically signed by Arnulfo Mcclendon MD on June 19, 2019 at 5:04 PM.

## 2019-06-18 NOTE — PROGRESS NOTES
PROGRESS NOTE    By Fadi Ortez D.O GI Fellow    [facility]  Dr. Tea Nur MD, Dr. Nikita Garner MD, Dr Babara Collet, Dr. Mikala Leavitt MD, Dr. Anali Cristobal, [facility]  78 y.o.  male    SUBJECTIVE:  Patient seen and examined today at bedside. States his chest pain/odynophagia is still hurting him. Worse with foods, but now present without eating. Viscous lidocaine helps with pain. Otherwise, no other complaints. Denies SOB, fever, chills, N/V/D and dysphagia. OBJECTIVE:    /60   Pulse 86   Temp 97.8 °F (36.6 °C) (Oral)   Resp 14   Ht 5' 8\" (1.727 m)   Wt 116 lb (52.6 kg)   SpO2 95%   BMI 17.64 kg/m²     Gen: NAD, AAO x 3  HEENT:PEERL, no icterus  Heart: RRR, no M/R/G  Lungs: CTAB  Abd.: soft, NT, ND, BS +, no G/R, no HSM  Extr.: no C/C/E, no bruising      Stool (measured) : 0 mL  Lab Results   Component Value Date    WBC 9.9 06/17/2019    WBC 7.6 06/16/2019    WBC 11.0 06/08/2019    HGB 13.5 06/17/2019    HGB 13.8 06/16/2019    HGB 14.5 06/08/2019    HCT 38.8 06/17/2019    MCV 83.6 06/17/2019    RDW 13.3 06/17/2019     06/17/2019     06/16/2019     06/08/2019     Lab Results   Component Value Date     06/17/2019    K 4.6 06/17/2019    CL 94 06/17/2019    CO2 19 06/17/2019    BUN 20 06/17/2019    CREATININE 1.0 06/17/2019    CALCIUM 8.9 06/17/2019    PROT 7.1 06/16/2019    LABALBU 3.4 06/16/2019    LABALBU 4.1 03/07/2011    BILITOT 0.8 06/16/2019    BILITOT 1.1 04/06/2019    BILITOT 0.8 11/22/2017    ALKPHOS 107 06/16/2019    ALKPHOS 99 04/06/2019    ALKPHOS 94 11/22/2017    AST 17 06/16/2019    AST 29 04/06/2019    AST 26 11/22/2017    ALT 18 06/16/2019    ALT 10 04/06/2019    ALT 22 11/22/2017     Lab Results   Component Value Date    LIPASE 29 06/16/2019    LIPASE 9 04/06/2019    LIPASE 13 11/20/2017     No results found for: AMYLASE      ASSESSMENT/PLAN:    1.  Odynophagia  - chronic and slowly progressive, associated unintentional weight loss  - barium swallow shows hiatal hernia  - continue oral PPI twice daily  - continue viscous lidocaine for symptomatic relief  - Xarelto (10mg dose, GFR > 60) on hold x 24 hours, needs 48 hrs total  - continue diet as tolerated, NPO after midnight tentatively for EGD tomorrow pending cardiac eval  - appreciate cardiology evaluation    Above recommendations are tentative at this time. Case will be discussed with attending physician. Manjeet Dave DO  6/18/2019  8:44 AM     Pt seen and independently examined. Pertinent notes and lab work reviewed. D/w Dr. Idalia Cunningham. Agree with physical exam and A&P. Discussed with patient/family at bedside - all questions answered - agreeable with the plan as delineated.       Missy Martin MD  6/18/2019  1:31 PM

## 2019-06-18 NOTE — PROGRESS NOTES
Subjective: Lillian Diaz was seen and examined at bedside today. The patient's questions were answered and tests were reviewed. There were no new problems reported overnight. Patient is still not able to tolerate diet  EGD planned for tomorrow    A complete review of systems and social history was completed on admission and remains unchanged unless otherwise noted    Scheduled Meds:   insulin glargine  10 Units Subcutaneous Nightly    pantoprazole  40 mg Oral BID AC    metoprolol succinate  25 mg Oral Daily    [Held by provider] rivaroxaban  10 mg Oral Daily with breakfast    simvastatin  40 mg Oral Nightly    vitamin D  1,000 Units Oral Daily    sodium chloride flush  10 mL Intravenous 2 times per day    insulin lispro  0-12 Units Subcutaneous TID WC    insulin lispro  0-6 Units Subcutaneous Nightly    cefTRIAXone (ROCEPHIN) IV  1 g Intravenous Q24H    metFORMIN  500 mg Oral BID WC     Continuous Infusions:   sodium chloride 75 mL/hr at 06/17/19 2045    dextrose       PRN Meds:ALOE VESTA PROTECTIVE, lidocaine viscous hcl, acetaminophen, sodium chloride flush, magnesium hydroxide, ondansetron, glucose, dextrose, glucagon (rDNA), dextrose    Objective:  /60   Pulse 86   Temp 97.8 °F (36.6 °C) (Oral)   Resp 14   Ht 5' 8\" (1.727 m)   Wt 116 lb (52.6 kg)   SpO2 95%   BMI 17.64 kg/m²   In: 300 [P.O.:300]  Out: -    In: 300   Out: -      AAO x 2, currently in NAD  RRR, pos S1, S2  CTA bilaterally, no wheeze, rales or rhonchi  bowel sounds present, nontender, nondistended  No clubbing, cyanosis, or edema  No neuro changes   No obvious rashes or lesions.     Recent Labs     06/16/19  1445 06/17/19  0353   WBC 7.6 9.9   HGB 13.8 13.5    200     Recent Labs     06/16/19  1445 06/16/19  1451 06/17/19  0353   *  --  133   K 4.8  --  4.6   CL 98  --  94*   CO2 23  --  19*   BUN 22  --  20   CREATININE 1.0  --  1.0   GLUCOSE 251* 222 276*     Recent Labs     06/16/19  1445   BILITOT 0.8

## 2019-06-18 NOTE — PROGRESS NOTES
SPEECH/LANGUAGE PATHOLOGY  BEDSIDE SWALLOWING EVALUATION    PATIENT NAME:  Laurie Bauman      :  1940      TODAY'S DATE:  2019    SUMMARY OF EVALUATION     DYSPHAGIA DIAGNOSIS:  Functional swallow for thin liquids      DIET RECOMMENDATIONS: Thin consistency clear liquids as tolerated, will assess solid consistencies once cleared by GI. FEEDING RECOMMENDATIONS:     Assistance level:  No assistance needed      Compensatory strategies recommended: Small bites/sips and Alternate solids and liquids    THERAPY RECOMMENDATIONS:      Dysphagia therapy is recommended 3-5 times per week with emphasis on the following and To continue assessment of solid consistencies due to limited trials this evaluation, and To monitor oral intake during meals and make recommendations for diet changes as appropriate                  PROCEDURE     Consistencies Administered During the Evaluation   Liquids: thin liquid   Solids:  Unable to assess      Method of Intake:   cup, spoon  Self fed      Position:   Seated, upright    Patient report:  Patient reports pain in chest when eating. RESULTS     Oral Stage: The oral stage of swallowing was within functional limits      Pharyngeal Stage:      No signs of aspiration were noted during this evaluation however, silent aspiration cannot be ruled out at bedside. If silent aspiration is suspected, a Videofluoroscopic Study of Swallowing (MBS) is recommended and requires a physician order. Prognosis for improvements is good  This plan will be re-evaluated and revised in 1 week  if warranted. Patient stated goals: Agreed with above  Treatment goals discussed with Patient,  The Patient understand the diagnosis, prognosis and plan of care. [x]The admitting diagnosis and active problem list, as listed below have been reviewed prior to initiation of this evaluation.      ADMITTING DIAGNOSIS: UTI (urinary tract infection) [N39.0]     ACTIVE

## 2019-06-19 ENCOUNTER — ANESTHESIA (OUTPATIENT)
Dept: ENDOSCOPY | Age: 79
DRG: 689 | End: 2019-06-19
Payer: COMMERCIAL

## 2019-06-19 VITALS
DIASTOLIC BLOOD PRESSURE: 95 MMHG | RESPIRATION RATE: 16 BRPM | OXYGEN SATURATION: 99 % | SYSTOLIC BLOOD PRESSURE: 173 MMHG

## 2019-06-19 LAB
METER GLUCOSE: 64 MG/DL (ref 74–99)
METER GLUCOSE: 74 MG/DL (ref 74–99)
METER GLUCOSE: 80 MG/DL (ref 74–99)
METER GLUCOSE: 96 MG/DL (ref 74–99)

## 2019-06-19 PROCEDURE — 97530 THERAPEUTIC ACTIVITIES: CPT

## 2019-06-19 PROCEDURE — 7100000010 HC PHASE II RECOVERY - FIRST 15 MIN: Performed by: INTERNAL MEDICINE

## 2019-06-19 PROCEDURE — 1200000000 HC SEMI PRIVATE

## 2019-06-19 PROCEDURE — 3700000001 HC ADD 15 MINUTES (ANESTHESIA): Performed by: INTERNAL MEDICINE

## 2019-06-19 PROCEDURE — 97110 THERAPEUTIC EXERCISES: CPT

## 2019-06-19 PROCEDURE — 2580000003 HC RX 258: Performed by: NURSE ANESTHETIST, CERTIFIED REGISTERED

## 2019-06-19 PROCEDURE — 6370000000 HC RX 637 (ALT 250 FOR IP): Performed by: STUDENT IN AN ORGANIZED HEALTH CARE EDUCATION/TRAINING PROGRAM

## 2019-06-19 PROCEDURE — 3700000000 HC ANESTHESIA ATTENDED CARE: Performed by: INTERNAL MEDICINE

## 2019-06-19 PROCEDURE — 0DB58ZX EXCISION OF ESOPHAGUS, VIA NATURAL OR ARTIFICIAL OPENING ENDOSCOPIC, DIAGNOSTIC: ICD-10-PCS | Performed by: INTERNAL MEDICINE

## 2019-06-19 PROCEDURE — 0DB68ZX EXCISION OF STOMACH, VIA NATURAL OR ARTIFICIAL OPENING ENDOSCOPIC, DIAGNOSTIC: ICD-10-PCS | Performed by: INTERNAL MEDICINE

## 2019-06-19 PROCEDURE — 2709999900 HC NON-CHARGEABLE SUPPLY: Performed by: INTERNAL MEDICINE

## 2019-06-19 PROCEDURE — 6370000000 HC RX 637 (ALT 250 FOR IP): Performed by: INTERNAL MEDICINE

## 2019-06-19 PROCEDURE — 6360000002 HC RX W HCPCS: Performed by: NURSE ANESTHETIST, CERTIFIED REGISTERED

## 2019-06-19 PROCEDURE — 88305 TISSUE EXAM BY PATHOLOGIST: CPT

## 2019-06-19 PROCEDURE — 82962 GLUCOSE BLOOD TEST: CPT

## 2019-06-19 PROCEDURE — 88342 IMHCHEM/IMCYTCHM 1ST ANTB: CPT

## 2019-06-19 PROCEDURE — 3609012400 HC EGD TRANSORAL BIOPSY SINGLE/MULTIPLE: Performed by: INTERNAL MEDICINE

## 2019-06-19 PROCEDURE — 88341 IMHCHEM/IMCYTCHM EA ADD ANTB: CPT

## 2019-06-19 PROCEDURE — 7100000011 HC PHASE II RECOVERY - ADDTL 15 MIN: Performed by: INTERNAL MEDICINE

## 2019-06-19 RX ORDER — CEPHALEXIN 250 MG/1
250 CAPSULE ORAL EVERY 6 HOURS SCHEDULED
Status: DISCONTINUED | OUTPATIENT
Start: 2019-06-19 | End: 2019-06-21 | Stop reason: HOSPADM

## 2019-06-19 RX ORDER — SODIUM CHLORIDE 9 MG/ML
INJECTION, SOLUTION INTRAVENOUS CONTINUOUS PRN
Status: DISCONTINUED | OUTPATIENT
Start: 2019-06-19 | End: 2019-06-19 | Stop reason: SDUPTHER

## 2019-06-19 RX ORDER — PROPOFOL 10 MG/ML
INJECTION, EMULSION INTRAVENOUS PRN
Status: DISCONTINUED | OUTPATIENT
Start: 2019-06-19 | End: 2019-06-19 | Stop reason: SDUPTHER

## 2019-06-19 RX ADMIN — CEPHALEXIN 250 MG: 250 CAPSULE ORAL at 22:21

## 2019-06-19 RX ADMIN — METOPROLOL SUCCINATE 25 MG: 25 TABLET, EXTENDED RELEASE ORAL at 09:25

## 2019-06-19 RX ADMIN — SODIUM CHLORIDE: 9 INJECTION, SOLUTION INTRAVENOUS at 16:51

## 2019-06-19 RX ADMIN — PROPOFOL 150 MG: 10 INJECTION, EMULSION INTRAVENOUS at 17:04

## 2019-06-19 RX ADMIN — SIMVASTATIN 40 MG: 40 TABLET, FILM COATED ORAL at 22:21

## 2019-06-19 RX ADMIN — PANTOPRAZOLE SODIUM 40 MG: 40 TABLET, DELAYED RELEASE ORAL at 06:19

## 2019-06-19 ASSESSMENT — PAIN SCALES - GENERAL
PAINLEVEL_OUTOF10: 0

## 2019-06-19 NOTE — PROGRESS NOTES
Physical Therapy  Physical Therapy  Daily Treatment Note  6/19/2019  0313/0313-02                      Render Cords   [id number]                              1940    Patient Active Problem List   Diagnosis    UTI (urinary tract infection)    Cerebral artery occlusion with cerebral infarction ([facility] [address]    Weakness    Dementia    Severe protein-calorie malnutrition ([facility] [address]    Odynophagia       Recommendation for discharge: Subacute vs. HHPT if patient meets goals with family assist  Equipment prescriptions needed:to be determined    AM-[facility] Mobility Inpatient   How much difficulty turning over in bed?: A Little  How much difficulty sitting down on / standing up from a chair with arms?: A Little  How much difficulty moving from lying on back to sitting on side of bed?: A Lot  How much help from another person moving to and from a bed to a chair?: A Little  How much help from another person needed to walk in hospital room?: A Little  How much help from another person for climbing 3-5 steps with a railing?: A Lot  AM-[facility] Inpatient Mobility Raw Score : 16  AM-PAC Inpatient T-Scale Score : 40.78  Mobility Inpatient CMS 0-100% Score: 54.16  Mobility Inpatient CMS G-Code Modifier : CK      Precautions: falls, alarm    S: Patient cleared by nursing for treatment. Patient is agreeable to treatment. Pain status: (measured on a visual analog scale with 0=no pain and 10=excruciating pain) 0/10. O: Pt was instructed in and performed the following:   Bed Mobility- Supine to sit-Moderate assistance x 1     Scooting- Minimal Assist x 1    Sit to supine-Not assessed   Transfers-sit to stand- Minimal assists x 1     Gait:  Patient ambulated 3 feet using Wheeled Walker with Minimal assists x 1. Comments: V/C for upright posture and safety.    Steps: Not assessed  Treatment: Pt transferred to edge of bed, sat for 5 minutes, stood, ambulated to the chair,  pt performed seated ex's: ankle pumps, long arc quad, marching, hip abduction, hip adduction,  x 15 reps. Comment: V/C for technique. Comment: Call light left by patient. Pt in chair at end of tx session with chair alarm on and notified nursing. A: Pt able to stand and transfer to the chair with no LOB but distance limited by impaired strength/endurance. P: Continue with physical therapy   PETTY ANTUNEZ, PTA   GOALS to be met in 2 days. Bed mobility-  Minimal assist                                                  Transfers-Sit to stand-Minimal assist               Gait:  Patient to ambulate 25 feet using wheeled walker with Minimal assist      Increase strength in affected mm groups by 1/3 grade  Increase balance to allow for improvement towards functional goals. Increase endurance to allow for improvement towards functional goals.

## 2019-06-19 NOTE — PLAN OF CARE
Problem: Falls - Risk of:  Goal: Will remain free from falls  Description  Will remain free from falls  6/19/2019 1435 by Irina Horn RN  Outcome: Met This Shift  Note:   High falls risk,falls band-yellow socks on,bed alarm on. Call light in reach. 6/19/2019 0104 by Leslie Bill RN  Outcome: Met This Shift  Goal: Absence of physical injury  Description  Absence of physical injury  6/19/2019 1435 by Irina Horn RN  Outcome: Met This Shift  6/19/2019 0104 by Leslie Bill RN  Outcome: Met This Shift     Problem: Risk for Impaired Skin Integrity  Goal: Tissue integrity - skin and mucous membranes  Description  Structural intactness and normal physiological function of skin and  mucous membranes. 6/19/2019 1435 by Irina Horn RN  Outcome: Met This Shift  6/19/2019 0104 by Leslie Bill RN  Outcome: Met This Shift     Problem: Infection:  Goal: Will remain free from infection  Description  Will remain free from infection  6/19/2019 1435 by Irina Horn RN  Outcome: Met This Shift  6/19/2019 0104 by Leslie Bill RN  Outcome: Met This Shift     Problem: Safety:  Goal: Free from accidental physical injury  Description  Free from accidental physical injury  6/19/2019 1435 by Irina Horn RN  Outcome: Met This Shift  6/19/2019 0104 by Leslie Bill RN  Outcome: Met This Shift     Problem: Daily Care:  Goal: Daily care needs are met  Description  Daily care needs are met  6/19/2019 1435 by Irina Horn RN  Outcome: Met This Shift  6/19/2019 0104 by Leslie Bill RN  Outcome: Met This Shift     Problem: Pain:  Goal: Pain level will decrease  Description  Pain level will decrease  6/19/2019 1435 by Irina Horn RN  Outcome: Met This Shift  Note:   No c/o pain.   6/19/2019 0104 by Leslie Bill RN  Outcome: Met This Shift  Goal: Control of acute pain  Description  Control of acute pain  6/19/2019 1435 by Irina Horn RN  Outcome: Met This Shift  6/19/2019 0104

## 2019-06-19 NOTE — PROGRESS NOTES
Subjective: Neha Galeana was seen and examined at bedside today. The patient's questions were answered and tests were reviewed. There were no new problems reported overnight. Patient is currently eating dinner  Results of EGD reviewed  No other issues reported    A complete review of systems and social history was completed on admission and remains unchanged unless otherwise noted    Scheduled Meds:   insulin glargine  10 Units Subcutaneous Nightly    pantoprazole  40 mg Oral BID AC    metoprolol succinate  25 mg Oral Daily    [Held by provider] rivaroxaban  10 mg Oral Daily with breakfast    simvastatin  40 mg Oral Nightly    vitamin D  1,000 Units Oral Daily    sodium chloride flush  10 mL Intravenous 2 times per day    insulin lispro  0-12 Units Subcutaneous TID WC    insulin lispro  0-6 Units Subcutaneous Nightly    cefTRIAXone (ROCEPHIN) IV  1 g Intravenous Q24H    metFORMIN  500 mg Oral BID WC     Continuous Infusions:   sodium chloride 75 mL/hr at 06/18/19 1600    dextrose       PRN Meds:ALOE VESTA PROTECTIVE, lidocaine viscous hcl, acetaminophen, sodium chloride flush, magnesium hydroxide, ondansetron, glucose, dextrose, glucagon (rDNA), dextrose    Objective:  /65   Pulse 71   Temp 97.6 °F (36.4 °C) (Oral)   Resp 18   Ht 5' 8\" (1.727 m)   Wt 116 lb (52.6 kg)   SpO2 99%   BMI 17.64 kg/m²   In: 800 [I.V.:800]  Out: 300    In: 800   Out: 300 [Urine:300]     AAO x 3, currently in NAD  RRR, pos S1, S2  CTA bilaterally, no wheeze, rales or rhonchi  bowel sounds present, nontender, nondistended  No clubbing, cyanosis, or edema  No neuro changes   No obvious rashes or lesions. Recent Labs     06/17/19  0353   WBC 9.9   HGB 13.5        Recent Labs     06/17/19  0353      K 4.6   CL 94*   CO2 19*   BUN 20   CREATININE 1.0   GLUCOSE 276*     No results for input(s): BILITOT, ALKPHOS, AST, ALT in the last 72 hours.   Recent Labs     06/17/19  1839   INR 1.5     No results for input(s): CKTOTAL, CKMB, CKMBINDEX, TROPONINI in the last 72 hours. Ct Head Wo Contrast    Result Date: 6/16/2019  Reading location: 200 INDICATION: Altered mental status FINDINGS: Noncontrast CT images through the brain parenchyma. Automated dose control. Midline ventricles are mildly dilated. Moderate cortical atrophy. Extensive hypodensity in the deep cerebral white matter probably on the basis of small vessel ischemic disease. Well-defined, less than 1 cm hypodense foci in the right thalamus likely remote lacunar infarct. No focal cortical abnormality. No intracranial hemorrhage or mass effect or large extra-axial fluid collection. No acute finding    Fl Esophagram    Result Date: 6/17/2019  Reading location 200 Single contrast esophagram HISTORY: Epigastric pain and hiccups Radiographic fluoroscopic evaluation. Fluoroscopy time 1 minute. 11 images. FINDINGS: Esophagus is normal caliber and course. Mucosal pattern is normal. Small hiatal hernia. Hiatal hernia    Xr Chest Portable    Result Date: 6/16/2019  Location:200 Exam: XR CHEST PORTABLE Comparison: 6/8/2019 History: Fatigue Findings: Portable AP upright chest. Heart size is normal. Pulmonary vessels are nondistended. No focal pulmonary parenchymal consolidation. No acute cardiopulmonary disease. Assessment:  Cat Arguelles is a 78y.o. year old male who presented on 6/16/2019 and is being treated for:  Principal Problem:    UTI (urinary tract infection)  Active Problems:    Cerebral artery occlusion with cerebral infarction ([address]    Weakness    Dementia    Severe protein-calorie malnutrition ([address]    Odynophagia  Resolved Problems:    * No resolved hospital problems. *    Plan  · Await egd biopsy results  · Cont PPI, change to po abx and resume blood thinners in am  · Otherwise continue current therapy. · Please see orders for further management and care.   · D/c to rehab when precert come through    Sveta Hector MD  5:52 PM  6/19/2019

## 2019-06-19 NOTE — PLAN OF CARE
Problem: Falls - Risk of:  Goal: Will remain free from falls  Description  Will remain free from falls  6/19/2019 0104 by Tiffany Beck RN  Outcome: Met This Shift     Problem: Falls - Risk of:  Goal: Absence of physical injury  Description  Absence of physical injury  6/19/2019 0104 by Tiffany Beck RN  Outcome: Met This Shift     Problem: Risk for Impaired Skin Integrity  Goal: Tissue integrity - skin and mucous membranes  Description  Structural intactness and normal physiological function of skin and  mucous membranes.   6/19/2019 0104 by Tiffany Beck RN  Outcome: Met This Shift     Problem: Infection:  Goal: Will remain free from infection  Description  Will remain free from infection  6/19/2019 0104 by Tiffany Beck RN  Outcome: Met This Shift     Problem: Safety:  Goal: Free from accidental physical injury  Description  Free from accidental physical injury  6/19/2019 0104 by Tiffany Beck RN  Outcome: Met This Shift     Problem: Daily Care:  Goal: Daily care needs are met  Description  Daily care needs are met  6/19/2019 0104 by Tiffany Beck RN  Outcome: Met This Shift     Problem: Pain:  Goal: Pain level will decrease  Description  Pain level will decrease  6/19/2019 0104 by Tifafny Beck RN  Outcome: Met This Shift     Problem: Pain:  Goal: Control of acute pain  Description  Control of acute pain  6/19/2019 0104 by Tiffany Beck RN  Outcome: Met This Shift

## 2019-06-19 NOTE — PLAN OF CARE
Problem: Falls - Risk of:  Goal: Will remain free from falls  Description  Will remain free from falls  6/18/2019 2253 by Shanta Clayton RN  Outcome: Met This Shift     Problem: Falls - Risk of:  Goal: Absence of physical injury  Description  Absence of physical injury  6/18/2019 2253 by Shanta Clayton RN  Outcome: Met This Shift     Problem: Risk for Impaired Skin Integrity  Goal: Tissue integrity - skin and mucous membranes  Description  Structural intactness and normal physiological function of skin and  mucous membranes.   6/18/2019 2253 by Shanta Clayton RN  Outcome: Met This Shift     Problem: Infection:  Goal: Will remain free from infection  Description  Will remain free from infection  6/18/2019 2253 by Shanta Clayton RN  Outcome: Met This Shift     Problem: Safety:  Goal: Free from accidental physical injury  Description  Free from accidental physical injury  6/18/2019 2253 by Shanta Clayton RN  Outcome: Met This Shift

## 2019-06-19 NOTE — PROCEDURES
Procedure:  Esophagogastroduodenoscopy with Biopsy    Indication:  Odynophagia    Sedation  MAC    Endoscope was advanced easily through mouth to second portion of duodenum      Oropharynx views are limited but grossly normal.    Esophagus:   LA-D ulcerative esophagitis 40 cm to 30 cm- Bx taken      Stomach:   Antrum is normal    Gastric body is normal.    Retroflexed views show normal fundus and cardia. - Bx taken    Duodenum: Bulb has moderate duodenitis    Second portion of duodenum is normal.    IMPRESSION AND PLAN:     1. LA-D ulcerative esophagitis 40 cm to 30 cm        Bulb has moderate duodenitis           R/O CMV, HSV, pill-esophagitis. Continue per consultation- diet as tolerated    Follow up as outpatient in office, call [phone] to schedule for appointment.       Julissa Bates MD  6/19/2019  5:04 PM

## 2019-06-19 NOTE — PROGRESS NOTES
Occupational Therapy  O.T. Bedside Treatment Note    Date:2019  Patient Name: Summer Diallo  MRN: [id number]  : 1940  ROOM #: 0313/0313-02    Problem list / Diagnosis:   Patient Active Problem List   Diagnosis    UTI (urinary tract infection)    [facility]bral artery occlusion with [facility] infarction [facility] [address]    Weakness    Dementia    Severe protein-calorie malnutrition ([facility] [address]    Odynophagia     Past Medical History:   Past Medical History:   Diagnosis Date    Cerebral artery occlusion with cerebral infarction ([facility] [address]     Diabetes mellitus ([facility]     Hyperlipidemia     Hypertension      Onset/Medical history: medical history reviewed  Past medical history: reviewed    The admitting diagnosis and active problem list as listed above have been reviewed prior to treatment. Nursing cleared the patient for treatment. Patient is agreeable to treatment. Discharge recommendations: Subacute   Equipment prescriptions needed: TBD    AM - [facility] Daily Activity Inpatient        Precautions: falls, alarm    S:  - Pt cleared for treatment through nursing.  - Pain:  Pt c/o 0/10 pain; pt was willing to participate in therapy. Nurse notified. - no family present. O:    Function Assessment    Status  Comment  Goal    LE dressing:  Maximal Assist  To don/doff socks while seated EOB. Independent    Bed Mobility:    Moderate Assist for supine to sit; Moderate Assist for scooting; Moderate Assist for sit to supine Educated pt on safety, fall prevention and hand placement Independent    Functional Transfers:  Maximal Assist x1sit to stand from EOB using hand rail, 2 attempts Verbal cues for hand placement, pt declined further activity  Independent    Functional mobility: n/t    Verbal cues for walker sequence and safety Modified [place]      - BUE A/AAROM/PROM exercises: BUE ex's in all planes of movement to increase ROM required for functional transfers/ADL participation.  Exercises completed in shoulder and elbow flexion/extension, internal/external rotation, abduction/adduction. A:  -Balance: Sitting: fair  at EOB for 10 minutes with Min A- Supervision  -Endurance: poor (2* to decreased endurance, strength, and fatigue), fatigues easily  -Edema: no  -Safety:fair   - Pt demo'd fair- activity tolerance.   - Pt/family education: bed mobility, transfer training, functional mobility, AE for LE dressing, sequencing, hand placement, walker saftey, DME, BUE strengthening ex's, BUE ROM ex's, ECT's  -Pt would benefit from additional ADLs, safety education, balance activities, transfer training, strength exercises, and over all endurance building.     P:  - Plan of care: Patient will be seen by OT 1-3 times a week for therapeutic activity, ADL re-training, bed mobility, functional transfers, functional mobility, safety and fall prevention, balance and endurance activities, instruction in energy conservation principles, and patient/family education.   - Patient and/or family understands diagnosis, prognosis and plan of care: yes     Time Code treatment minutes: 28  Electronically signed by Consuelo Holley OT on 6/19/2019 at 2:55 PM    Consuelo Holley OTR/L #[id number]

## 2019-06-19 NOTE — PROGRESS NOTES
Speech Language Pathology      NAME:  Brigette Thornton  :  1940  DATE: 2019  ROOM:  0313/0313-02    Patient not seen for swallow assessment of solids NPO for scope    UTI (urinary tract infection) [N39.0]    Lesley Escobar MSCCC/SLP  Speech Language Pathologist  [id number]

## 2019-06-19 NOTE — ANESTHESIA POSTPROCEDURE EVALUATION
Department of Anesthesiology  Postprocedure Note    Patient: Agustin Aguilar  MRN: [id number]  YOB: 1940  Date of evaluation: 6/19/2019  Time:  6:37 PM     Procedure Summary     Date:  06/19/19 Room / Location:  [facility] 02 / [facility] ENDOSCOPY    Anesthesia Start:  4778 Anesthesia Stop:  8632    Procedure:  EGD BIOPSY (N/A ) Diagnosis:  (ODYNOPHAGIA)    Surgeon:  Margaux Wright MD Responsible Provider:  Jennifer Lozada MD    Anesthesia Type:  MAC ASA Status:  3          Anesthesia Type: MAC    Beatris Phase I:      Beatris Phase II: Beatris Score: 10    Last vitals: Reviewed and per EMR flowsheets.        Anesthesia Post Evaluation    Patient location during evaluation: PACU  Patient participation: complete - patient participated  Level of consciousness: awake and alert  Airway patency: patent  Nausea & Vomiting: no nausea and no vomiting  Complications: no  Cardiovascular status: hemodynamically stable  Respiratory status: acceptable  Hydration status: euvolemic

## 2019-06-19 NOTE — CONSULTS
Oral Daily    [Held by provider] rivaroxaban  10 mg Oral Daily with breakfast    simvastatin  40 mg Oral Nightly    vitamin D  1,000 Units Oral Daily    sodium chloride flush  10 mL Intravenous 2 times per day    insulin lispro  0-12 Units Subcutaneous TID     insulin lispro  0-6 Units Subcutaneous Nightly    cefTRIAXone (ROCEPHIN) IV  1 g Intravenous Q24H    metFORMIN  500 mg Oral BID        OUTPT MEDS  Current Discharge Medication List      CONTINUE these medications which have NOT CHANGED    Details   ranitidine (ZANTAC) 150 MG tablet Take 150 mg by mouth 2 times daily      omeprazole (PRILOSEC) 40 MG delayed release capsule Take 40 mg by mouth daily      vitamin D (CHOLECALCIFEROL) 1000 UNIT TABS tablet Take 1,000 Units by mouth daily      rivaroxaban (XARELTO) 10 MG TABS tablet Take 10 mg by mouth daily (with breakfast)      metoprolol succinate (TOPROL XL) 25 MG extended release tablet Take 25 mg by mouth daily      simvastatin (ZOCOR) 40 MG tablet Take 40 mg by mouth nightly. metformin (GLUCOPHAGE) 500 MG tablet Take 1,000 mg by mouth 2 times daily (with meals). PHYSICAL EXAM:      BP (!) 142/70   Pulse 74   Temp 99 °F (37.2 °C) (Oral)   Resp 16   Ht 5' 8\" (1.727 m)   Wt 116 lb (52.6 kg)   SpO2 94%   BMI 17.64 kg/m²        Examination:  General: Alert and oriented, No acute distress. Neck: Supple. No jugular venous distention. Respiratory: Lungs are clear to auscultation, Respirations are non-labored, Breath sounds are equal, Symmetrical chest wall expansion. Cardiovascular: Normal rate, No murmur, No gallop, Good pulses equal in all extremities, No edema. Gastrointestinal: Soft, Non-tender, Non-distended, Normal bowel sounds. Musculoskeletal: Normal strength, No tenderness, No deformity. Neurologic: Alert, Oriented, No focal deficits. Cognition and Speech: Oriented, Speech clear and coherent.       CBC:   Recent Labs     06/16/19  1445 06/17/19  0353   WBC 7.6 9. 9   HGB 13.8 13.5   HCT 39.8 38.8    200     BMP:   Recent Labs     06/16/19  1445 06/16/19  1451 06/17/19  0353   *  --  133   K 4.8  --  4.6   CO2 23  --  19*   BUN 22  --  20   CREATININE 1.0  --  1.0   LABGLOM >60  --  >60   GLUCOSE 251* 222 276*     BNP: No results for input(s): BNP in the last 72 hours. PT/INR:   Recent Labs     06/17/19  1839   PROTIME 16.2*   INR 1.5     APTT:No results for input(s): APTT in the last 72 hours. CARDIAC ENZYMES:  Recent Labs     06/16/19  1445   TROPONINI <0.01     FASTING LIPID PANEL:  Lab Results   Component Value Date    HDL 58 05/20/2014    LDLCALC 81 05/20/2014    TRIG 118 05/20/2014     LIVER PROFILE:  Recent Labs     06/16/19  1445   AST 17   ALT 18   LABALBU 3.4*       Radiology:  Xr Pelvis (1-2 Views)    Result Date: 6/8/2019  LOCATION: 200 COMPARISON: None HISTORY: Status post fall. TECHNIQUE: Single frontal view of the pelvis was obtained in supine position. FINDINGS:  Single frontal view of the pelvis shows no acute fracture or dislocation. No abnormal periosteal reaction. No abnormal osteolytic or osteoblastic lesion. The visualized sacroiliac joints are symmetrical and unremarkable. The pubic symphysis joint shows small subchondral sclerosis at the articular surfaces. Femoral heads are normal in density and configuration bilaterally. The left hip joint space is preserved. Mild arthritic changes at the right hip joint. No significant soft tissue abnormality. 1. No acute traumatic bony injury radiographically. 2. Mild osteitis pubis. 3. 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 Xr Sacrum Coccyx (min 2 Views)    Result Date: 6/8/2019  Location 200 Sacrum and coccyx CLINICAL INDICATION: Status post fall No previous study for comparison. TECHNIQUE: 4 views of the sacrum and coccyx FINDINGS: No acute fracture or dislocation. Sacroiliac joints are symmetrical and unremarkable.  Minimal contour irregularity with very small subchondral sclerosis at the articular surfaces of the pubic symphysis joint. No abnormal calcifications or mass in the presacral space. Moderate posterior facet joint degenerative changes at L5-S1 level. Moderate neural foraminal narrowing at L5-S1 level. 1. No acute traumatic bony injury radiographically. 2. Very mild osteitis pubis. 3. Moderate posterior facet joint degenerative changes at L5-S1 level. 4. Moderate neural foraminal narrowing at L5-S1 level. Ct Head Wo Contrast    Result Date: 6/16/2019  Reading location: 200 INDICATION: Altered mental status FINDINGS: Noncontrast CT images through the brain parenchyma. Automated dose control. Midline ventricles are mildly dilated. Moderate cortical atrophy. Extensive hypodensity in the deep cerebral white matter probably on the basis of small vessel ischemic disease. Well-defined, less than 1 cm hypodense foci in the right thalamus likely remote lacunar infarct. No focal cortical abnormality. No intracranial hemorrhage or mass effect or large extra-axial fluid collection. No acute finding    Fl Esophagram    Result Date: 6/17/2019  Reading location 200 Single contrast esophagram HISTORY: Epigastric pain and hiccups Radiographic fluoroscopic evaluation. Fluoroscopy time 1 minute. 11 images. FINDINGS: Esophagus is normal caliber and course. Mucosal pattern is normal. Small hiatal hernia. Hiatal hernia    Xr Chest Portable    Result Date: 6/16/2019  Location:200 Exam: XR CHEST PORTABLE Comparison: 6/8/2019 History: Fatigue Findings: Portable AP upright chest. Heart size is normal. Pulmonary vessels are nondistended. No focal pulmonary parenchymal consolidation. No acute cardiopulmonary disease. Xr Chest 1 Vw    Result Date: 6/8/2019  LOCATION: 200 EXAM: XR CHEST 1 VIEW COMPARISON: April 6, 2019 HISTORY: Status post fall TECHNIQUE: Single AP view of the chest was obtained in upright position. FINDINGS:  SUPPORT DEVICES: None.  LUNGS: Clear with no areas of consolidation. PLEURA: No effusions or pneumothorax. LUNG VOLUMES: Satisfactory inspirator effort. MEDIASTINAL STRUCTURES: No lymphadenopathy. Normal aortic contour. HEART SIZE: Normal. BONES AND SOFT TISSUES: No fracture or soft tissue abnormality. 1. Negative portable chest.          ECG: Sinus rhythm, right bundle branch block, left anterior fascicular block. IMPRESSION:    Preoperative cardiac evaluation. Weakness. Dementia. Odynophagia  Malnutrition. RECOMMENDATIONS:    Patient is not having any cardiac symptoms. His EKG is unremarkable. He is not in CHF clinically. EGD is a very low risk procedure. Okay to proceed without additional cardiac testing.         Leila Simon MD, [facility] [place]

## 2019-06-19 NOTE — PROGRESS NOTES
[facility] Quality Flow/Interdisciplinary Rounds Progress Note        Quality Flow Rounds held on June 19, 2019    Disciplines Attending:  Bedside Nurse, ,  and Nursing Unit Leadership    Patricia Harrison was admitted on 6/16/2019  1:42 PM    Anticipated Discharge Date:  Expected Discharge Date: 06/19/19    Disposition:    Freddie Score:  Freddie Scale Score: 15    Readmission Risk              Risk of Unplanned Readmission:        13           Discussed patient goal for the day, patient clinical progression, and barriers to discharge. The following Goal(s) of the Day/Commitment(s) have been identified:   Follow up with EGD findings      Arn Party  June 19, 2019

## 2019-06-20 VITALS
HEART RATE: 66 BPM | DIASTOLIC BLOOD PRESSURE: 72 MMHG | OXYGEN SATURATION: 99 % | HEIGHT: 68 IN | RESPIRATION RATE: 16 BRPM | WEIGHT: 116 LBS | TEMPERATURE: 97.8 F | SYSTOLIC BLOOD PRESSURE: 142 MMHG | BODY MASS INDEX: 17.58 KG/M2

## 2019-06-20 LAB
ANION GAP SERPL CALCULATED.3IONS-SCNC: 9 MMOL/L (ref 7–16)
BUN BLDV-MCNC: 14 MG/DL (ref 8–23)
CALCIUM SERPL-MCNC: 8.3 MG/DL (ref 8.6–10.2)
CHLORIDE BLD-SCNC: 105 MMOL/L (ref 98–107)
CO2: 23 MMOL/L (ref 22–29)
CREAT SERPL-MCNC: 0.8 MG/DL (ref 0.7–1.2)
GFR AFRICAN AMERICAN: >60
GFR NON-AFRICAN AMERICAN: >60 ML/MIN/1.73
GLUCOSE BLD-MCNC: 161 MG/DL (ref 74–99)
HCT VFR BLD CALC: 31.1 % (ref 37–54)
HEMOGLOBIN: 10.4 G/DL (ref 12.5–16.5)
MCH RBC QN AUTO: 28.5 PG (ref 26–35)
MCHC RBC AUTO-ENTMCNC: 33.4 % (ref 32–34.5)
MCV RBC AUTO: 85.2 FL (ref 80–99.9)
METER GLUCOSE: 141 MG/DL (ref 74–99)
METER GLUCOSE: 295 MG/DL (ref 74–99)
METER GLUCOSE: 89 MG/DL (ref 74–99)
PDW BLD-RTO: 13.6 FL (ref 11.5–15)
PLATELET # BLD: 187 E9/L (ref 130–450)
PMV BLD AUTO: 11 FL (ref 7–12)
POTASSIUM SERPL-SCNC: 3.8 MMOL/L (ref 3.5–5)
RBC # BLD: 3.65 E12/L (ref 3.8–5.8)
SODIUM BLD-SCNC: 137 MMOL/L (ref 132–146)
WBC # BLD: 6.2 E9/L (ref 4.5–11.5)

## 2019-06-20 PROCEDURE — 97110 THERAPEUTIC EXERCISES: CPT

## 2019-06-20 PROCEDURE — 6370000000 HC RX 637 (ALT 250 FOR IP): Performed by: STUDENT IN AN ORGANIZED HEALTH CARE EDUCATION/TRAINING PROGRAM

## 2019-06-20 PROCEDURE — 85027 COMPLETE CBC AUTOMATED: CPT

## 2019-06-20 PROCEDURE — 82962 GLUCOSE BLOOD TEST: CPT

## 2019-06-20 PROCEDURE — 92526 ORAL FUNCTION THERAPY: CPT | Performed by: SPEECH-LANGUAGE PATHOLOGIST

## 2019-06-20 PROCEDURE — 6370000000 HC RX 637 (ALT 250 FOR IP): Performed by: INTERNAL MEDICINE

## 2019-06-20 PROCEDURE — 97530 THERAPEUTIC ACTIVITIES: CPT

## 2019-06-20 PROCEDURE — 36415 COLL VENOUS BLD VENIPUNCTURE: CPT

## 2019-06-20 PROCEDURE — 80048 BASIC METABOLIC PNL TOTAL CA: CPT

## 2019-06-20 RX ORDER — CEPHALEXIN 250 MG/1
250 CAPSULE ORAL 3 TIMES DAILY
DISCHARGE
Start: 2019-06-20 | End: 2019-06-23

## 2019-06-20 RX ORDER — PANTOPRAZOLE SODIUM 40 MG/1
40 TABLET, DELAYED RELEASE ORAL
Qty: 30 TABLET | Refills: 0 | DISCHARGE
Start: 2019-06-21 | End: 2019-10-30 | Stop reason: ALTCHOICE

## 2019-06-20 RX ORDER — LORAZEPAM 0.5 MG/1
0.5 TABLET ORAL ONCE
Status: DISCONTINUED | OUTPATIENT
Start: 2019-06-20 | End: 2019-06-21 | Stop reason: HOSPADM

## 2019-06-20 RX ADMIN — PETROLATUM: 430 OINTMENT TOPICAL at 08:42

## 2019-06-20 RX ADMIN — VITAMIN D, TAB 1000IU (100/BT) 1000 UNITS: 25 TAB at 08:40

## 2019-06-20 RX ADMIN — CEPHALEXIN 250 MG: 250 CAPSULE ORAL at 17:24

## 2019-06-20 RX ADMIN — CEPHALEXIN 250 MG: 250 CAPSULE ORAL at 04:29

## 2019-06-20 RX ADMIN — PANTOPRAZOLE SODIUM 40 MG: 40 TABLET, DELAYED RELEASE ORAL at 17:23

## 2019-06-20 RX ADMIN — RIVAROXABAN 10 MG: 10 TABLET, FILM COATED ORAL at 08:41

## 2019-06-20 RX ADMIN — INSULIN LISPRO 6 UNITS: 100 INJECTION, SOLUTION INTRAVENOUS; SUBCUTANEOUS at 12:04

## 2019-06-20 RX ADMIN — METFORMIN HYDROCHLORIDE 500 MG: 500 TABLET ORAL at 08:40

## 2019-06-20 RX ADMIN — METOPROLOL SUCCINATE 25 MG: 25 TABLET, EXTENDED RELEASE ORAL at 08:41

## 2019-06-20 RX ADMIN — PANTOPRAZOLE SODIUM 40 MG: 40 TABLET, DELAYED RELEASE ORAL at 06:03

## 2019-06-20 RX ADMIN — CEPHALEXIN 250 MG: 250 CAPSULE ORAL at 12:08

## 2019-06-20 RX ADMIN — METFORMIN HYDROCHLORIDE 500 MG: 500 TABLET ORAL at 17:23

## 2019-06-20 ASSESSMENT — PAIN SCALES - GENERAL
PAINLEVEL_OUTOF10: 0
PAINLEVEL_OUTOF10: 0

## 2019-06-20 NOTE — CARE COORDINATION
SS Note/Discharge plan:  Notified by [facility] admissions for [employer] that they received insurance PRE CERT which is good only for today, nursing notified, sw will follow for medical d/c and to confirm transfer arrangements. Electronically signed by MAYELIN Paez on 6/20/2019 at 2:05 PM

## 2019-06-20 NOTE — PLAN OF CARE
Problem: Falls - Risk of:  Goal: Will remain free from falls  Description  Will remain free from falls  6/19/2019 2232 by Mushtaq Bergeron RN  Outcome: Met This Shift     Problem: Risk for Impaired Skin Integrity  Goal: Tissue integrity - skin and mucous membranes  Description  Structural intactness and normal physiological function of skin and  mucous membranes.   6/19/2019 2232 by Mushtaq Bergeron RN  Outcome: Met This Shift     Problem: Infection:  Goal: Will remain free from infection  Description  Will remain free from infection  6/19/2019 2232 by Mushtaq Bergeron RN  Outcome: Met This Shift     Problem: Safety:  Goal: Free from accidental physical injury  Description  Free from accidental physical injury  6/19/2019 2232 by Mushtaq Bergeron RN  Outcome: Met This Shift     Problem: Daily Care:  Goal: Daily care needs are met  Description  Daily care needs are met  6/19/2019 2232 by Mushtaq Bergeron RN  Outcome: Met This Shift     Problem: Pain:  Goal: Pain level will decrease  Description  Pain level will decrease  6/19/2019 2232 by Mushtaq Bergeron RN  Outcome: Met This Shift

## 2019-06-20 NOTE — PROGRESS NOTES
Physical Therapy  Physical Therapy  Daily Treatment Note  6/20/2019  0313/0313-02                      Rai Garcia   [id number]                              1940    Patient Active Problem List   Diagnosis    UTI (urinary tract infection)    Cerebral artery occlusion with cerebral infarction ([facility] [address]    Weakness    Dementia    Severe protein-calorie malnutrition ([facility] [address]    Odynophagia       Recommendation for discharge: Subacute vs. HHPT if patient meets goals with family assist  Equipment prescriptions needed:to be determined    AM-[facility] Mobility Inpatient   How much difficulty turning over in bed?: A Little  How much difficulty sitting down on / standing up from a chair with arms?: A Little  How much difficulty moving from lying on back to sitting on side of bed?: A Little  How much help from another person moving to and from a bed to a chair?: A Little  How much help from another person needed to walk in hospital room?: A Little  How much help from another person for climbing 3-5 steps with a railing?: A Lot  AM-PAC Inpatient Mobility Raw Score : 17  AM-PAC Inpatient T-Scale Score : 42.13  Mobility Inpatient CMS 0-100% Score: 50.57  Mobility Inpatient CMS G-Code Modifier : CK      Precautions: falls, alarm    S: Patient cleared by nursing for treatment. Patient is agreeable to treatment. Pain status: (measured on a visual analog scale with 0=no pain and 10=excruciating pain) 0/10. O: Pt was instructed in and performed the following:   Bed Mobility- Supine to sit-Minimal assistance x 1     Scooting- Minimal Assist x 1    Sit to supine-Not assessed   Transfers-sit to stand- Minimal assists x 1     Gait:  Patient ambulated 20 feet x 2 using Wheeled Walker with Minimal assists x 1. Comments: V/C for upright posture, staying within the walker, increased CARLOS/step length, and safety.    Steps: Not assessed  Treatment: Pt transferred to edge of bed, sat for 5 minutes, stood, ambulated to the door and back to the

## 2019-06-20 NOTE — DISCHARGE SUMMARY
Single frontal view of the pelvis was obtained in supine position. FINDINGS:  Single frontal view of the pelvis shows no acute fracture or dislocation. No abnormal periosteal reaction. No abnormal osteolytic or osteoblastic lesion. The visualized sacroiliac joints are symmetrical and unremarkable. The pubic symphysis joint shows small subchondral sclerosis at the articular surfaces. Femoral heads are normal in density and configuration bilaterally. The left hip joint space is preserved. Mild arthritic changes at the right hip joint. No significant soft tissue abnormality. 1. No acute traumatic bony injury radiographically. 2. Mild osteitis pubis. 3. 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 Xr Sacrum Coccyx (min 2 Views)    Result Date: 6/8/2019  Location 200 Sacrum and coccyx CLINICAL INDICATION: Status post fall No previous study for comparison. TECHNIQUE: 4 views of the sacrum and coccyx FINDINGS: No acute fracture or dislocation. Sacroiliac joints are symmetrical and unremarkable. Minimal contour irregularity with very small subchondral sclerosis at the articular surfaces of the pubic symphysis joint. No abnormal calcifications or mass in the presacral space. Moderate posterior facet joint degenerative changes at L5-S1 level. Moderate neural foraminal narrowing at L5-S1 level. 1. No acute traumatic bony injury radiographically. 2. Very mild osteitis pubis. 3. Moderate posterior facet joint degenerative changes at L5-S1 level. 4. Moderate neural foraminal narrowing at L5-S1 level. Ct Head Wo Contrast    Result Date: 6/16/2019  Reading location: 200 INDICATION: Altered mental status FINDINGS: Noncontrast CT images through the brain parenchyma. Automated dose control. Midline ventricles are mildly dilated. Moderate cortical atrophy. Extensive hypodensity in the deep cerebral white matter probably on the basis of small vessel ischemic disease.  Well-defined, less than 1 cm hypodense foci in the right thalamus likely remote lacunar infarct. No focal cortical abnormality. No intracranial hemorrhage or mass effect or large extra-axial fluid collection. No acute finding    Fl Esophagram    Result Date: 6/17/2019  Reading location 200 Single contrast esophagram HISTORY: Epigastric pain and hiccups Radiographic fluoroscopic evaluation. Fluoroscopy time 1 minute. 11 images. FINDINGS: Esophagus is normal caliber and course. Mucosal pattern is normal. Small hiatal hernia. Hiatal hernia    Xr Chest Portable    Result Date: 6/16/2019  Location:200 Exam: XR CHEST PORTABLE Comparison: 6/8/2019 History: Fatigue Findings: Portable AP upright chest. Heart size is normal. Pulmonary vessels are nondistended. No focal pulmonary parenchymal consolidation. No acute cardiopulmonary disease. Xr Chest 1 Vw    Result Date: 6/8/2019  LOCATION: 200 EXAM: XR CHEST 1 VIEW COMPARISON: April 6, 2019 HISTORY: Status post fall TECHNIQUE: Single AP view of the chest was obtained in upright position. FINDINGS:  SUPPORT DEVICES: None. LUNGS: Clear with no areas of consolidation. PLEURA: No effusions or pneumothorax. LUNG VOLUMES: Satisfactory inspirator effort. MEDIASTINAL STRUCTURES: No lymphadenopathy. Normal aortic contour. HEART SIZE: Normal. BONES AND SOFT TISSUES: No fracture or soft tissue abnormality.      1. Negative portable chest.      Lab Results   Component Value Date    WBC 6.2 06/20/2019    HGB 10.4 06/20/2019    HCT 31.1 06/20/2019    MCV 85.2 06/20/2019     06/20/2019     06/20/2019    K 3.8 06/20/2019    K 4.6 06/17/2019     06/20/2019    CO2 23 06/20/2019    BUN 14 06/20/2019    CREATININE 0.8 06/20/2019    CALCIUM 8.3 06/20/2019    BNP 63 01/03/2013    ALKPHOS 107 06/16/2019    ALT 18 06/16/2019    AST 17 06/16/2019    BILITOT 0.8 06/16/2019    BILIDIR 0.3 01/03/2013    LABALBU 3.4 06/16/2019    LABALBU 4.1 03/07/2011    LDLCALC 81 05/20/2014    TRIG 118 05/20/2014     Lab Results   Component Value Date    INR 1.5 06/17/2019    INR 1.2 05/10/2013     Discharge Medications:    Current Discharge Medication List           Details   cephALEXin (KEFLEX) 250 MG capsule Take 1 capsule by mouth 3 times daily for 3 days      pantoprazole (PROTONIX) 40 MG tablet Take 1 tablet by mouth 2 times daily (before meals)  Qty: 30 tablet, Refills: 0              Details   vitamin D (CHOLECALCIFEROL) 1000 UNIT TABS tablet Take 1,000 Units by mouth daily      rivaroxaban (XARELTO) 10 MG TABS tablet Take 10 mg by mouth daily (with breakfast)      metoprolol succinate (TOPROL XL) 25 MG extended release tablet Take 25 mg by mouth daily      simvastatin (ZOCOR) 40 MG tablet Take 40 mg by mouth nightly. metformin (GLUCOPHAGE) 500 MG tablet Take 1,000 mg by mouth 2 times daily (with meals). Current Discharge Medication List      STOP taking these medications       ranitidine (ZANTAC) 150 MG tablet Comments:   Reason for Stopping:         omeprazole (PRILOSEC) 40 MG delayed release capsule Comments:   Reason for Stopping:             Disposition:   SNF    Follow-up in office after d/c from rehab  Patient advised to bring all meds to appointment.     Note that over 30 minutes was spent in preparing discharge papers, discussing discharge with patient, nursing and ancillary staff, medication review, etc.    Signed:  Kat Kruse MD  6/20/2019, 5:47 PM

## 2019-06-20 NOTE — PROGRESS NOTES
Date:  6/20/2019  Patient: Leeann Sam  Admission:  6/16/2019  1:42 PM  Admit DX: UTI (urinary tract infection) [N39.0]  Age:  78 y. o., 1940     LOS: 4 days       SUBJECTIVE:    Patient is doing fairly well. No chest pain. No shortness of breath. OBJECTIVE:    BP (!) 142/72   Pulse 66   Temp 97.8 °F (36.6 °C) (Oral)   Resp 16   Ht 5' 8\" (1.727 m)   Wt 116 lb (52.6 kg)   SpO2 99%   BMI 17.64 kg/m²     Intake/Output Summary (Last 24 hours) at 6/20/2019 1435  Last data filed at 6/20/2019 1315  Gross per 24 hour   Intake 1020 ml   Output 675 ml   Net 345 ml       EXAM:   Neck: Supple. No jugular venous distention. Respiratory: Lungs are clear to auscultation, Respirations are non-labored, Breath sounds are equal, Symmetrical chest wall expansion. Cardiovascular: Normal rate, No murmur, No gallop, Good pulses equal in all extremities, No edema. Gastrointestinal: Soft, Non-tender, Non-distended, Normal bowel sounds. Musculoskeletal: Normal strength, No tenderness, No deformity. Neurologic: Alert, Oriented, No focal deficits. Cognition and Speech: Oriented, Speech clear and coherent. Current Inpatient Medications:   cephALEXin  250 mg Oral 4 times per day    pantoprazole  40 mg Oral BID AC    metoprolol succinate  25 mg Oral Daily    rivaroxaban  10 mg Oral Daily with breakfast    simvastatin  40 mg Oral Nightly    vitamin D  1,000 Units Oral Daily    sodium chloride flush  10 mL Intravenous 2 times per day    insulin lispro  0-12 Units Subcutaneous TID WC    insulin lispro  0-6 Units Subcutaneous Nightly    metFORMIN  500 mg Oral BID WC       IV Infusions (if any):   dextrose           Labs:   CBC:   Recent Labs     06/20/19 0417   WBC 6.2   HGB 10.4*   HCT 31.1*        BMP:   Recent Labs     06/20/19 0417      K 3.8   CO2 23   BUN 14   CREATININE 0.8   LABGLOM >60   GLUCOSE 161*     BNP: No results for input(s): BNP in the last 72 hours.   PT/INR:   Recent Labs     06/17/19  1839   PROTIME 16.2*   INR 1.5     APTT:No results for input(s): APTT in the last 72 hours. CARDIAC ENZYMES:No results for input(s): CKTOTAL, CKMB, CKMBINDEX, TROPONINI in the last 72 hours. FASTING LIPID PANEL:  Lab Results   Component Value Date    HDL 58 05/20/2014    LDLCALC 81 05/20/2014    TRIG 118 05/20/2014     LIVER PROFILE:No results for input(s): AST, ALT, LABALBU in the last 72 hours. ASSESSMENT:  Preoperative cardiac evaluation. Weakness. Dementia. Odynophagia  Malnutrition. PLAN:    Patient is currently not reporting any cardiac symptoms. He tolerated endoscopy without cardiac problems. His vitals are stable. I will sign off at this time.           Heather Calvert MD, [facility]

## 2019-06-20 NOTE — PLAN OF CARE
Problem: Falls - Risk of:  Goal: Will remain free from falls  Description  Will remain free from falls  Outcome: Met This Shift  Note:   Level 3 falls risk,falls band -yellow socks on,bed alarm on w/ call light in reach. Plan to go to [facility] on discharge. Goal: Absence of physical injury  Description  Absence of physical injury  Outcome: Met This Shift     Problem: Risk for Impaired Skin Integrity  Goal: Tissue integrity - skin and mucous membranes  Description  Structural intactness and normal physiological function of skin and  mucous membranes.   Outcome: Met This Shift     Problem: Infection:  Goal: Will remain free from infection  Description  Will remain free from infection  Outcome: Met This Shift     Problem: Safety:  Goal: Free from accidental physical injury  Description  Free from accidental physical injury  Outcome: Met This Shift     Problem: Daily Care:  Goal: Daily care needs are met  Description  Daily care needs are met  Outcome: Met This Shift

## 2019-06-20 NOTE — PLAN OF CARE
OK to be d/c from GI POV. PPI bid on d/c  Follow in office in 2-3 weeks or earlier as needed - patient's family to call for appointment and with questions/concerns at [phone].   Thank you for the opportunity to participate in the care of Darrel Pan MD  6/20/2019  2:49 PM

## 2019-06-20 NOTE — PROGRESS NOTES
Speech Language Pathology      NAME:  Steve Rodgers  :  1940  DATE: 2019  ROOM:  0313/0313-02    Pt seen 15 minutes for dysphagia intervention. Pt had just previously finished lunch, for which he reported having no difficulties chewing or swallowing. Pt educated on safe swallow strategies and verbalized agreement. Will return at a later time to continue assessment of solid consistency intake.     UTI (urinary tract infection) [N39.0]    Allen Solorzano  Speech-Language Pathology Student Intern

## 2019-07-16 PROBLEM — N39.0 UTI (URINARY TRACT INFECTION): Status: RESOLVED | Noted: 2019-06-16 | Resolved: 2019-07-16

## 2019-08-16 ENCOUNTER — HOSPITAL ENCOUNTER (OUTPATIENT)
Dept: AUDIOLOGY | Age: 79
Discharge: HOME | End: 2019-08-16

## 2019-08-22 ENCOUNTER — HOSPITAL ENCOUNTER (OUTPATIENT)
Dept: AUDIOLOGY | Age: 79
Discharge: HOME | End: 2019-08-22
Payer: COMMERCIAL

## 2019-08-22 ENCOUNTER — HOSPITAL ENCOUNTER (OUTPATIENT)
Age: 79
Discharge: HOME | End: 2019-08-22
Payer: COMMERCIAL

## 2019-08-22 PROCEDURE — V5266 BATTERY FOR HEARING DEVICE: HCPCS | Performed by: AUDIOLOGIST

## 2019-10-30 ENCOUNTER — HOSPITAL ENCOUNTER (INPATIENT)
Age: 79
LOS: 8 days | Discharge: NURSING, REHAB OR LONG-TERM CARE | DRG: 871 | End: 2019-11-07
Attending: EMERGENCY MEDICINE | Admitting: INTERNAL MEDICINE
Payer: COMMERCIAL

## 2019-10-30 ENCOUNTER — APPOINTMENT (OUTPATIENT)
Dept: GENERAL RADIOLOGY | Age: 79
DRG: 871 | End: 2019-10-30
Payer: COMMERCIAL

## 2019-10-30 DIAGNOSIS — A41.9 SEPSIS WITHOUT ACUTE ORGAN DYSFUNCTION, DUE TO UNSPECIFIED ORGANISM (HCC): Primary | ICD-10-CM

## 2019-10-30 DIAGNOSIS — R31.9 URINARY TRACT INFECTION WITH HEMATURIA, SITE UNSPECIFIED: ICD-10-CM

## 2019-10-30 DIAGNOSIS — N39.0 URINARY TRACT INFECTION WITH HEMATURIA, SITE UNSPECIFIED: ICD-10-CM

## 2019-10-30 LAB
ALBUMIN SERPL-MCNC: 3.4 G/DL (ref 3.5–5.2)
ALP BLD-CCNC: 95 U/L (ref 40–129)
ALT SERPL-CCNC: 11 U/L (ref 0–40)
ANION GAP SERPL CALCULATED.3IONS-SCNC: 13 MMOL/L (ref 7–16)
APTT: 33.3 SEC (ref 24.5–35.1)
AST SERPL-CCNC: 17 U/L (ref 0–39)
BACTERIA: ABNORMAL /HPF
BASOPHILS ABSOLUTE: 0.02 E9/L (ref 0–0.2)
BASOPHILS RELATIVE PERCENT: 0.2 % (ref 0–2)
BILIRUB SERPL-MCNC: 0.6 MG/DL (ref 0–1.2)
BILIRUBIN URINE: NEGATIVE
BLOOD, URINE: ABNORMAL
BUN BLDV-MCNC: 17 MG/DL (ref 8–23)
CALCIUM SERPL-MCNC: 9.5 MG/DL (ref 8.6–10.2)
CHLORIDE BLD-SCNC: 100 MMOL/L (ref 98–107)
CLARITY: ABNORMAL
CO2: 27 MMOL/L (ref 22–29)
COLOR: YELLOW
CREAT SERPL-MCNC: 1.1 MG/DL (ref 0.7–1.2)
EOSINOPHILS ABSOLUTE: 0.07 E9/L (ref 0.05–0.5)
EOSINOPHILS RELATIVE PERCENT: 0.6 % (ref 0–6)
EPITHELIAL CELLS, UA: ABNORMAL /HPF
GFR AFRICAN AMERICAN: >60
GFR NON-AFRICAN AMERICAN: >60 ML/MIN/1.73
GLUCOSE BLD-MCNC: 322 MG/DL (ref 74–99)
GLUCOSE URINE: 500 MG/DL
HCT VFR BLD CALC: 37.6 % (ref 37–54)
HEMOGLOBIN: 12.2 G/DL (ref 12.5–16.5)
IMMATURE GRANULOCYTES #: 0.05 E9/L
IMMATURE GRANULOCYTES %: 0.4 % (ref 0–5)
INFLUENZA A BY PCR: NOT DETECTED
INFLUENZA B BY PCR: NOT DETECTED
INR BLD: 1.2
KETONES, URINE: NEGATIVE MG/DL
LACTIC ACID, SEPSIS: 4.4 MMOL/L (ref 0.5–1.9)
LACTIC ACID: 2.8 MMOL/L (ref 0.5–2.2)
LEUKOCYTE ESTERASE, URINE: ABNORMAL
LIPASE: 4 U/L (ref 13–60)
LYMPHOCYTES ABSOLUTE: 1.33 E9/L (ref 1.5–4)
LYMPHOCYTES RELATIVE PERCENT: 11 % (ref 20–42)
MCH RBC QN AUTO: 28.9 PG (ref 26–35)
MCHC RBC AUTO-ENTMCNC: 32.4 % (ref 32–34.5)
MCV RBC AUTO: 89.1 FL (ref 80–99.9)
METER GLUCOSE: 142 MG/DL (ref 74–99)
METER GLUCOSE: 181 MG/DL (ref 74–99)
MONOCYTES ABSOLUTE: 0.62 E9/L (ref 0.1–0.95)
MONOCYTES RELATIVE PERCENT: 5.1 % (ref 2–12)
NEUTROPHILS ABSOLUTE: 9.98 E9/L (ref 1.8–7.3)
NEUTROPHILS RELATIVE PERCENT: 82.7 % (ref 43–80)
NITRITE, URINE: NEGATIVE
PDW BLD-RTO: 13.4 FL (ref 11.5–15)
PH UA: 5.5 (ref 5–9)
PLATELET # BLD: 224 E9/L (ref 130–450)
PMV BLD AUTO: 10.6 FL (ref 7–12)
POTASSIUM REFLEX MAGNESIUM: 4.8 MMOL/L (ref 3.5–5)
PRO-BNP: 1462 PG/ML (ref 0–450)
PROTEIN UA: 30 MG/DL
PROTHROMBIN TIME: 13.3 SEC (ref 9.3–12.4)
RBC # BLD: 4.22 E12/L (ref 3.8–5.8)
RBC UA: ABNORMAL /HPF (ref 0–2)
SEDIMENTATION RATE, ERYTHROCYTE: 47 MM/HR (ref 0–15)
SODIUM BLD-SCNC: 140 MMOL/L (ref 132–146)
SPECIFIC GRAVITY UA: 1.01 (ref 1–1.03)
TOTAL PROTEIN: 6.7 G/DL (ref 6.4–8.3)
TROPONIN: 0.02 NG/ML (ref 0–0.03)
UROBILINOGEN, URINE: 1 E.U./DL
WBC # BLD: 12.1 E9/L (ref 4.5–11.5)
WBC UA: >20 /HPF (ref 0–5)

## 2019-10-30 PROCEDURE — 83605 ASSAY OF LACTIC ACID: CPT

## 2019-10-30 PROCEDURE — 80053 COMPREHEN METABOLIC PANEL: CPT

## 2019-10-30 PROCEDURE — 96360 HYDRATION IV INFUSION INIT: CPT

## 2019-10-30 PROCEDURE — 96361 HYDRATE IV INFUSION ADD-ON: CPT

## 2019-10-30 PROCEDURE — 82962 GLUCOSE BLOOD TEST: CPT

## 2019-10-30 PROCEDURE — 85651 RBC SED RATE NONAUTOMATED: CPT

## 2019-10-30 PROCEDURE — 93005 ELECTROCARDIOGRAM TRACING: CPT | Performed by: EMERGENCY MEDICINE

## 2019-10-30 PROCEDURE — 71045 X-RAY EXAM CHEST 1 VIEW: CPT

## 2019-10-30 PROCEDURE — 81001 URINALYSIS AUTO W/SCOPE: CPT

## 2019-10-30 PROCEDURE — 99223 1ST HOSP IP/OBS HIGH 75: CPT | Performed by: INTERNAL MEDICINE

## 2019-10-30 PROCEDURE — 6360000002 HC RX W HCPCS: Performed by: EMERGENCY MEDICINE

## 2019-10-30 PROCEDURE — 84484 ASSAY OF TROPONIN QUANT: CPT

## 2019-10-30 PROCEDURE — 87581 M.PNEUMON DNA AMP PROBE: CPT

## 2019-10-30 PROCEDURE — 87040 BLOOD CULTURE FOR BACTERIA: CPT

## 2019-10-30 PROCEDURE — 87798 DETECT AGENT NOS DNA AMP: CPT

## 2019-10-30 PROCEDURE — 87502 INFLUENZA DNA AMP PROBE: CPT

## 2019-10-30 PROCEDURE — 85025 COMPLETE CBC W/AUTO DIFF WBC: CPT

## 2019-10-30 PROCEDURE — 83690 ASSAY OF LIPASE: CPT

## 2019-10-30 PROCEDURE — 87633 RESP VIRUS 12-25 TARGETS: CPT

## 2019-10-30 PROCEDURE — 6360000002 HC RX W HCPCS: Performed by: INTERNAL MEDICINE

## 2019-10-30 PROCEDURE — 2580000003 HC RX 258: Performed by: EMERGENCY MEDICINE

## 2019-10-30 PROCEDURE — 83880 ASSAY OF NATRIURETIC PEPTIDE: CPT

## 2019-10-30 PROCEDURE — 1200000000 HC SEMI PRIVATE

## 2019-10-30 PROCEDURE — 2580000003 HC RX 258: Performed by: INTERNAL MEDICINE

## 2019-10-30 PROCEDURE — 36415 COLL VENOUS BLD VENIPUNCTURE: CPT

## 2019-10-30 PROCEDURE — 87486 CHLMYD PNEUM DNA AMP PROBE: CPT

## 2019-10-30 PROCEDURE — 85730 THROMBOPLASTIN TIME PARTIAL: CPT

## 2019-10-30 PROCEDURE — 85610 PROTHROMBIN TIME: CPT

## 2019-10-30 PROCEDURE — 99285 EMERGENCY DEPT VISIT HI MDM: CPT

## 2019-10-30 PROCEDURE — 6370000000 HC RX 637 (ALT 250 FOR IP): Performed by: INTERNAL MEDICINE

## 2019-10-30 RX ORDER — SODIUM CHLORIDE 0.9 % (FLUSH) 0.9 %
10 SYRINGE (ML) INJECTION EVERY 12 HOURS SCHEDULED
Status: DISCONTINUED | OUTPATIENT
Start: 2019-10-30 | End: 2019-11-07 | Stop reason: HOSPADM

## 2019-10-30 RX ORDER — NICOTINE POLACRILEX 4 MG
15 LOZENGE BUCCAL PRN
Status: DISCONTINUED | OUTPATIENT
Start: 2019-10-30 | End: 2019-11-07 | Stop reason: HOSPADM

## 2019-10-30 RX ORDER — RISPERIDONE 1 MG/1
1 TABLET, FILM COATED ORAL NIGHTLY
Status: DISCONTINUED | OUTPATIENT
Start: 2019-10-30 | End: 2019-11-07 | Stop reason: HOSPADM

## 2019-10-30 RX ORDER — SODIUM CHLORIDE 9 MG/ML
INJECTION, SOLUTION INTRAVENOUS CONTINUOUS
Status: DISCONTINUED | OUTPATIENT
Start: 2019-10-30 | End: 2019-11-02

## 2019-10-30 RX ORDER — 0.9 % SODIUM CHLORIDE 0.9 %
500 INTRAVENOUS SOLUTION INTRAVENOUS ONCE
Status: COMPLETED | OUTPATIENT
Start: 2019-10-30 | End: 2019-10-30

## 2019-10-30 RX ORDER — PANTOPRAZOLE SODIUM 40 MG/1
40 TABLET, DELAYED RELEASE ORAL
Status: DISCONTINUED | OUTPATIENT
Start: 2019-10-31 | End: 2019-11-03

## 2019-10-30 RX ORDER — MIRTAZAPINE 15 MG/1
15 TABLET, ORALLY DISINTEGRATING ORAL NIGHTLY
Status: ON HOLD | COMMUNITY
End: 2019-11-07 | Stop reason: HOSPADM

## 2019-10-30 RX ORDER — OMEPRAZOLE 40 MG/1
40 CAPSULE, DELAYED RELEASE ORAL DAILY
COMMUNITY
End: 2020-01-06 | Stop reason: ALTCHOICE

## 2019-10-30 RX ORDER — RANITIDINE 150 MG/1
150 TABLET ORAL NIGHTLY
COMMUNITY
End: 2020-01-06 | Stop reason: ALTCHOICE

## 2019-10-30 RX ORDER — 0.9 % SODIUM CHLORIDE 0.9 %
1000 INTRAVENOUS SOLUTION INTRAVENOUS ONCE
Status: COMPLETED | OUTPATIENT
Start: 2019-10-30 | End: 2019-10-30

## 2019-10-30 RX ORDER — DEXTROSE MONOHYDRATE 25 G/50ML
12.5 INJECTION, SOLUTION INTRAVENOUS PRN
Status: DISCONTINUED | OUTPATIENT
Start: 2019-10-30 | End: 2019-11-07 | Stop reason: HOSPADM

## 2019-10-30 RX ORDER — ONDANSETRON 2 MG/ML
4 INJECTION INTRAMUSCULAR; INTRAVENOUS EVERY 6 HOURS PRN
Status: DISCONTINUED | OUTPATIENT
Start: 2019-10-30 | End: 2019-11-07 | Stop reason: HOSPADM

## 2019-10-30 RX ORDER — DEXTROSE MONOHYDRATE 50 MG/ML
100 INJECTION, SOLUTION INTRAVENOUS PRN
Status: DISCONTINUED | OUTPATIENT
Start: 2019-10-30 | End: 2019-11-07 | Stop reason: HOSPADM

## 2019-10-30 RX ORDER — RISPERIDONE 1 MG/1
1 TABLET, FILM COATED ORAL DAILY
COMMUNITY

## 2019-10-30 RX ORDER — SODIUM CHLORIDE 0.9 % (FLUSH) 0.9 %
10 SYRINGE (ML) INJECTION PRN
Status: DISCONTINUED | OUTPATIENT
Start: 2019-10-30 | End: 2019-11-07 | Stop reason: HOSPADM

## 2019-10-30 RX ADMIN — SODIUM CHLORIDE 1000 ML: 9 INJECTION, SOLUTION INTRAVENOUS at 10:30

## 2019-10-30 RX ADMIN — WATER 2 G: 1 INJECTION INTRAMUSCULAR; INTRAVENOUS; SUBCUTANEOUS at 14:00

## 2019-10-30 RX ADMIN — SODIUM CHLORIDE: 9 INJECTION, SOLUTION INTRAVENOUS at 16:42

## 2019-10-30 RX ADMIN — SODIUM CHLORIDE 500 ML: 9 INJECTION, SOLUTION INTRAVENOUS at 13:19

## 2019-10-30 RX ADMIN — INSULIN LISPRO 1 UNITS: 100 INJECTION, SOLUTION INTRAVENOUS; SUBCUTANEOUS at 21:21

## 2019-10-30 RX ADMIN — ENOXAPARIN SODIUM 40 MG: 40 INJECTION SUBCUTANEOUS at 16:53

## 2019-10-30 RX ADMIN — RISPERIDONE 1 MG: 1 TABLET ORAL at 21:08

## 2019-10-30 ASSESSMENT — PAIN SCALES - GENERAL: PAINLEVEL_OUTOF10: 0

## 2019-10-31 PROBLEM — E43 SEVERE PROTEIN-CALORIE MALNUTRITION (HCC): Status: ACTIVE | Noted: 2019-10-31

## 2019-10-31 LAB
ANION GAP SERPL CALCULATED.3IONS-SCNC: 11 MMOL/L (ref 7–16)
BASOPHILS ABSOLUTE: 0.02 E9/L (ref 0–0.2)
BASOPHILS RELATIVE PERCENT: 0.3 % (ref 0–2)
BUN BLDV-MCNC: 12 MG/DL (ref 8–23)
CALCIUM SERPL-MCNC: 8.8 MG/DL (ref 8.6–10.2)
CHLORIDE BLD-SCNC: 110 MMOL/L (ref 98–107)
CO2: 23 MMOL/L (ref 22–29)
CREAT SERPL-MCNC: 0.9 MG/DL (ref 0.7–1.2)
EKG ATRIAL RATE: 108 BPM
EKG P AXIS: 81 DEGREES
EKG P-R INTERVAL: 148 MS
EKG Q-T INTERVAL: 360 MS
EKG QRS DURATION: 104 MS
EKG QTC CALCULATION (BAZETT): 482 MS
EKG R AXIS: 29 DEGREES
EKG T AXIS: 64 DEGREES
EKG VENTRICULAR RATE: 108 BPM
EOSINOPHILS ABSOLUTE: 0.16 E9/L (ref 0.05–0.5)
EOSINOPHILS RELATIVE PERCENT: 2.3 % (ref 0–6)
FILM ARRAY ADENOVIRUS: NORMAL
FILM ARRAY BORDETELLA PERTUSSIS: NORMAL
FILM ARRAY CHLAMYDOPHILIA PNEUMONIAE: NORMAL
FILM ARRAY CORONAVIRUS 229E: NORMAL
FILM ARRAY CORONAVIRUS HKU1: NORMAL
FILM ARRAY CORONAVIRUS NL63: NORMAL
FILM ARRAY CORONAVIRUS OC43: NORMAL
FILM ARRAY INFLUENZA A VIRUS 09H1: NORMAL
FILM ARRAY INFLUENZA A VIRUS H1: NORMAL
FILM ARRAY INFLUENZA A VIRUS H3: NORMAL
FILM ARRAY INFLUENZA A VIRUS: NORMAL
FILM ARRAY INFLUENZA B: NORMAL
FILM ARRAY METAPNEUMOVIRUS: NORMAL
FILM ARRAY MYCOPLASMA PNEUMONIAE: NORMAL
FILM ARRAY PARAINFLUENZA VIRUS 1: NORMAL
FILM ARRAY PARAINFLUENZA VIRUS 2: NORMAL
FILM ARRAY PARAINFLUENZA VIRUS 3: NORMAL
FILM ARRAY PARAINFLUENZA VIRUS 4: NORMAL
FILM ARRAY RESPIRATORY SYNCITIAL VIRUS: NORMAL
FILM ARRAY RHINOVIRUS/ENTEROVIRUS: NORMAL
GFR AFRICAN AMERICAN: >60
GFR NON-AFRICAN AMERICAN: >60 ML/MIN/1.73
GLUCOSE BLD-MCNC: 124 MG/DL (ref 74–99)
HBA1C MFR BLD: 9.3 % (ref 4–5.6)
HCT VFR BLD CALC: 33.3 % (ref 37–54)
HEMOGLOBIN: 11 G/DL (ref 12.5–16.5)
IMMATURE GRANULOCYTES #: 0.03 E9/L
IMMATURE GRANULOCYTES %: 0.4 % (ref 0–5)
LACTIC ACID: 1.5 MMOL/L (ref 0.5–2.2)
LYMPHOCYTES ABSOLUTE: 1.9 E9/L (ref 1.5–4)
LYMPHOCYTES RELATIVE PERCENT: 27 % (ref 20–42)
MCH RBC QN AUTO: 29.1 PG (ref 26–35)
MCHC RBC AUTO-ENTMCNC: 33 % (ref 32–34.5)
MCV RBC AUTO: 88.1 FL (ref 80–99.9)
METER GLUCOSE: 105 MG/DL (ref 74–99)
METER GLUCOSE: 118 MG/DL (ref 74–99)
METER GLUCOSE: 157 MG/DL (ref 74–99)
METER GLUCOSE: 272 MG/DL (ref 74–99)
MONOCYTES ABSOLUTE: 0.44 E9/L (ref 0.1–0.95)
MONOCYTES RELATIVE PERCENT: 6.3 % (ref 2–12)
NEUTROPHILS ABSOLUTE: 4.48 E9/L (ref 1.8–7.3)
NEUTROPHILS RELATIVE PERCENT: 63.7 % (ref 43–80)
PDW BLD-RTO: 13.6 FL (ref 11.5–15)
PLATELET # BLD: 188 E9/L (ref 130–450)
PMV BLD AUTO: 10.3 FL (ref 7–12)
POTASSIUM REFLEX MAGNESIUM: 4.1 MMOL/L (ref 3.5–5)
RBC # BLD: 3.78 E12/L (ref 3.8–5.8)
SODIUM BLD-SCNC: 144 MMOL/L (ref 132–146)
WBC # BLD: 7 E9/L (ref 4.5–11.5)

## 2019-10-31 PROCEDURE — 93010 ELECTROCARDIOGRAM REPORT: CPT | Performed by: INTERNAL MEDICINE

## 2019-10-31 PROCEDURE — 83036 HEMOGLOBIN GLYCOSYLATED A1C: CPT

## 2019-10-31 PROCEDURE — 85025 COMPLETE CBC W/AUTO DIFF WBC: CPT

## 2019-10-31 PROCEDURE — 80048 BASIC METABOLIC PNL TOTAL CA: CPT

## 2019-10-31 PROCEDURE — 6360000002 HC RX W HCPCS: Performed by: INTERNAL MEDICINE

## 2019-10-31 PROCEDURE — 6370000000 HC RX 637 (ALT 250 FOR IP): Performed by: INTERNAL MEDICINE

## 2019-10-31 PROCEDURE — 99233 SBSQ HOSP IP/OBS HIGH 50: CPT | Performed by: INTERNAL MEDICINE

## 2019-10-31 PROCEDURE — 36415 COLL VENOUS BLD VENIPUNCTURE: CPT

## 2019-10-31 PROCEDURE — 1200000000 HC SEMI PRIVATE

## 2019-10-31 PROCEDURE — 2580000003 HC RX 258: Performed by: INTERNAL MEDICINE

## 2019-10-31 PROCEDURE — 83605 ASSAY OF LACTIC ACID: CPT

## 2019-10-31 PROCEDURE — 82962 GLUCOSE BLOOD TEST: CPT

## 2019-10-31 RX ADMIN — PANTOPRAZOLE SODIUM 40 MG: 40 TABLET, DELAYED RELEASE ORAL at 06:07

## 2019-10-31 RX ADMIN — INSULIN LISPRO 1 UNITS: 100 INJECTION, SOLUTION INTRAVENOUS; SUBCUTANEOUS at 17:01

## 2019-10-31 RX ADMIN — RISPERIDONE 1 MG: 1 TABLET ORAL at 20:43

## 2019-10-31 RX ADMIN — INSULIN LISPRO 3 UNITS: 100 INJECTION, SOLUTION INTRAVENOUS; SUBCUTANEOUS at 12:32

## 2019-10-31 RX ADMIN — SODIUM CHLORIDE: 9 INJECTION, SOLUTION INTRAVENOUS at 06:15

## 2019-10-31 RX ADMIN — ENOXAPARIN SODIUM 40 MG: 40 INJECTION SUBCUTANEOUS at 16:29

## 2019-10-31 RX ADMIN — Medication 10 ML: at 09:22

## 2019-10-31 RX ADMIN — CEFTRIAXONE SODIUM 1 G: 1 INJECTION, POWDER, FOR SOLUTION INTRAMUSCULAR; INTRAVENOUS at 13:48

## 2019-10-31 ASSESSMENT — PAIN SCALES - GENERAL: PAINLEVEL_OUTOF10: 0

## 2019-11-01 ENCOUNTER — APPOINTMENT (OUTPATIENT)
Dept: GENERAL RADIOLOGY | Age: 79
DRG: 871 | End: 2019-11-01
Payer: COMMERCIAL

## 2019-11-01 LAB
ANION GAP SERPL CALCULATED.3IONS-SCNC: 11 MMOL/L (ref 7–16)
BASOPHILS ABSOLUTE: 0.01 E9/L (ref 0–0.2)
BASOPHILS RELATIVE PERCENT: 0.1 % (ref 0–2)
BUN BLDV-MCNC: 12 MG/DL (ref 8–23)
CALCIUM SERPL-MCNC: 8.9 MG/DL (ref 8.6–10.2)
CHLORIDE BLD-SCNC: 108 MMOL/L (ref 98–107)
CO2: 21 MMOL/L (ref 22–29)
CREAT SERPL-MCNC: 0.9 MG/DL (ref 0.7–1.2)
EOSINOPHILS ABSOLUTE: 0.12 E9/L (ref 0.05–0.5)
EOSINOPHILS RELATIVE PERCENT: 1.5 % (ref 0–6)
GFR AFRICAN AMERICAN: >60
GFR NON-AFRICAN AMERICAN: >60 ML/MIN/1.73
GLUCOSE BLD-MCNC: 125 MG/DL (ref 74–99)
HCT VFR BLD CALC: 34.7 % (ref 37–54)
HEMOGLOBIN: 11.4 G/DL (ref 12.5–16.5)
IMMATURE GRANULOCYTES #: 0.04 E9/L
IMMATURE GRANULOCYTES %: 0.5 % (ref 0–5)
LACTIC ACID: 1.2 MMOL/L (ref 0.5–2.2)
LYMPHOCYTES ABSOLUTE: 1.92 E9/L (ref 1.5–4)
LYMPHOCYTES RELATIVE PERCENT: 24 % (ref 20–42)
MCH RBC QN AUTO: 29.3 PG (ref 26–35)
MCHC RBC AUTO-ENTMCNC: 32.9 % (ref 32–34.5)
MCV RBC AUTO: 89.2 FL (ref 80–99.9)
METER GLUCOSE: 102 MG/DL (ref 74–99)
METER GLUCOSE: 107 MG/DL (ref 74–99)
METER GLUCOSE: 116 MG/DL (ref 74–99)
METER GLUCOSE: 124 MG/DL (ref 74–99)
MONOCYTES ABSOLUTE: 0.44 E9/L (ref 0.1–0.95)
MONOCYTES RELATIVE PERCENT: 5.5 % (ref 2–12)
NEUTROPHILS ABSOLUTE: 5.48 E9/L (ref 1.8–7.3)
NEUTROPHILS RELATIVE PERCENT: 68.4 % (ref 43–80)
PDW BLD-RTO: 13.5 FL (ref 11.5–15)
PLATELET # BLD: 178 E9/L (ref 130–450)
PMV BLD AUTO: 10.7 FL (ref 7–12)
POTASSIUM REFLEX MAGNESIUM: 4.1 MMOL/L (ref 3.5–5)
RBC # BLD: 3.89 E12/L (ref 3.8–5.8)
SODIUM BLD-SCNC: 140 MMOL/L (ref 132–146)
WBC # BLD: 8 E9/L (ref 4.5–11.5)

## 2019-11-01 PROCEDURE — 99233 SBSQ HOSP IP/OBS HIGH 50: CPT | Performed by: INTERNAL MEDICINE

## 2019-11-01 PROCEDURE — 92610 EVALUATE SWALLOWING FUNCTION: CPT

## 2019-11-01 PROCEDURE — 97162 PT EVAL MOD COMPLEX 30 MIN: CPT | Performed by: PHYSICAL THERAPIST

## 2019-11-01 PROCEDURE — 97530 THERAPEUTIC ACTIVITIES: CPT | Performed by: PHYSICAL THERAPIST

## 2019-11-01 PROCEDURE — 74230 X-RAY XM SWLNG FUNCJ C+: CPT

## 2019-11-01 PROCEDURE — 1200000000 HC SEMI PRIVATE

## 2019-11-01 PROCEDURE — 92611 MOTION FLUOROSCOPY/SWALLOW: CPT

## 2019-11-01 PROCEDURE — 2500000003 HC RX 250 WO HCPCS: Performed by: INTERNAL MEDICINE

## 2019-11-01 PROCEDURE — 97530 THERAPEUTIC ACTIVITIES: CPT

## 2019-11-01 PROCEDURE — 71046 X-RAY EXAM CHEST 2 VIEWS: CPT

## 2019-11-01 PROCEDURE — 83605 ASSAY OF LACTIC ACID: CPT

## 2019-11-01 PROCEDURE — 85025 COMPLETE CBC W/AUTO DIFF WBC: CPT

## 2019-11-01 PROCEDURE — 82962 GLUCOSE BLOOD TEST: CPT

## 2019-11-01 PROCEDURE — 80048 BASIC METABOLIC PNL TOTAL CA: CPT

## 2019-11-01 PROCEDURE — 2580000003 HC RX 258: Performed by: INTERNAL MEDICINE

## 2019-11-01 PROCEDURE — 6360000002 HC RX W HCPCS: Performed by: INTERNAL MEDICINE

## 2019-11-01 PROCEDURE — 97166 OT EVAL MOD COMPLEX 45 MIN: CPT

## 2019-11-01 PROCEDURE — 6370000000 HC RX 637 (ALT 250 FOR IP): Performed by: INTERNAL MEDICINE

## 2019-11-01 PROCEDURE — 92526 ORAL FUNCTION THERAPY: CPT

## 2019-11-01 PROCEDURE — 97535 SELF CARE MNGMENT TRAINING: CPT

## 2019-11-01 PROCEDURE — 36415 COLL VENOUS BLD VENIPUNCTURE: CPT

## 2019-11-01 RX ORDER — DOXYCYCLINE HYCLATE 100 MG/1
100 CAPSULE ORAL EVERY 12 HOURS SCHEDULED
Status: DISCONTINUED | OUTPATIENT
Start: 2019-11-01 | End: 2019-11-01

## 2019-11-01 RX ADMIN — SODIUM CHLORIDE: 9 INJECTION, SOLUTION INTRAVENOUS at 11:51

## 2019-11-01 RX ADMIN — BARIUM SULFATE 45 G: 0.6 CREAM ORAL at 13:27

## 2019-11-01 RX ADMIN — SODIUM CHLORIDE: 9 INJECTION, SOLUTION INTRAVENOUS at 00:07

## 2019-11-01 RX ADMIN — BARIUM SULFATE 45 G: 0.81 POWDER, FOR SUSPENSION ORAL at 13:28

## 2019-11-01 RX ADMIN — BARIUM SULFATE 45 ML: 400 SUSPENSION ORAL at 13:27

## 2019-11-01 RX ADMIN — PANTOPRAZOLE SODIUM 40 MG: 40 TABLET, DELAYED RELEASE ORAL at 05:58

## 2019-11-01 RX ADMIN — ENOXAPARIN SODIUM 40 MG: 40 INJECTION SUBCUTANEOUS at 16:00

## 2019-11-01 RX ADMIN — DOXYCYCLINE 100 MG: 100 INJECTION, POWDER, LYOPHILIZED, FOR SOLUTION INTRAVENOUS at 14:07

## 2019-11-01 RX ADMIN — CEFTRIAXONE SODIUM 1 G: 1 INJECTION, POWDER, FOR SOLUTION INTRAMUSCULAR; INTRAVENOUS at 12:15

## 2019-11-02 ENCOUNTER — APPOINTMENT (OUTPATIENT)
Dept: GENERAL RADIOLOGY | Age: 79
DRG: 871 | End: 2019-11-02
Payer: COMMERCIAL

## 2019-11-02 LAB
ANION GAP SERPL CALCULATED.3IONS-SCNC: 9 MMOL/L (ref 7–16)
BASOPHILS ABSOLUTE: 0.02 E9/L (ref 0–0.2)
BASOPHILS RELATIVE PERCENT: 0.4 % (ref 0–2)
BUN BLDV-MCNC: 13 MG/DL (ref 8–23)
CALCIUM SERPL-MCNC: 8.8 MG/DL (ref 8.6–10.2)
CHLORIDE BLD-SCNC: 110 MMOL/L (ref 98–107)
CO2: 23 MMOL/L (ref 22–29)
CREAT SERPL-MCNC: 0.8 MG/DL (ref 0.7–1.2)
EOSINOPHILS ABSOLUTE: 0.18 E9/L (ref 0.05–0.5)
EOSINOPHILS RELATIVE PERCENT: 3.6 % (ref 0–6)
GFR AFRICAN AMERICAN: >60
GFR NON-AFRICAN AMERICAN: >60 ML/MIN/1.73
GLUCOSE BLD-MCNC: 101 MG/DL (ref 74–99)
HCT VFR BLD CALC: 29.2 % (ref 37–54)
HEMOGLOBIN: 9.6 G/DL (ref 12.5–16.5)
IMMATURE GRANULOCYTES #: 0.02 E9/L
IMMATURE GRANULOCYTES %: 0.4 % (ref 0–5)
LYMPHOCYTES ABSOLUTE: 1.37 E9/L (ref 1.5–4)
LYMPHOCYTES RELATIVE PERCENT: 27.3 % (ref 20–42)
MCH RBC QN AUTO: 29.6 PG (ref 26–35)
MCHC RBC AUTO-ENTMCNC: 32.9 % (ref 32–34.5)
MCV RBC AUTO: 90.1 FL (ref 80–99.9)
METER GLUCOSE: 86 MG/DL (ref 74–99)
METER GLUCOSE: 95 MG/DL (ref 74–99)
METER GLUCOSE: 97 MG/DL (ref 74–99)
METER GLUCOSE: 98 MG/DL (ref 74–99)
METER GLUCOSE: 99 MG/DL (ref 74–99)
MONOCYTES ABSOLUTE: 0.39 E9/L (ref 0.1–0.95)
MONOCYTES RELATIVE PERCENT: 7.8 % (ref 2–12)
NEUTROPHILS ABSOLUTE: 3.04 E9/L (ref 1.8–7.3)
NEUTROPHILS RELATIVE PERCENT: 60.5 % (ref 43–80)
PDW BLD-RTO: 13.3 FL (ref 11.5–15)
PLATELET # BLD: 167 E9/L (ref 130–450)
PMV BLD AUTO: 10.5 FL (ref 7–12)
POTASSIUM REFLEX MAGNESIUM: 4.1 MMOL/L (ref 3.5–5)
RBC # BLD: 3.24 E12/L (ref 3.8–5.8)
SODIUM BLD-SCNC: 142 MMOL/L (ref 132–146)
WBC # BLD: 5 E9/L (ref 4.5–11.5)

## 2019-11-02 PROCEDURE — 74018 RADEX ABDOMEN 1 VIEW: CPT

## 2019-11-02 PROCEDURE — 99233 SBSQ HOSP IP/OBS HIGH 50: CPT | Performed by: INTERNAL MEDICINE

## 2019-11-02 PROCEDURE — 2580000003 HC RX 258: Performed by: INTERNAL MEDICINE

## 2019-11-02 PROCEDURE — 85025 COMPLETE CBC W/AUTO DIFF WBC: CPT

## 2019-11-02 PROCEDURE — 2500000003 HC RX 250 WO HCPCS: Performed by: INTERNAL MEDICINE

## 2019-11-02 PROCEDURE — 6360000002 HC RX W HCPCS: Performed by: INTERNAL MEDICINE

## 2019-11-02 PROCEDURE — 36415 COLL VENOUS BLD VENIPUNCTURE: CPT

## 2019-11-02 PROCEDURE — 1200000000 HC SEMI PRIVATE

## 2019-11-02 PROCEDURE — 80048 BASIC METABOLIC PNL TOTAL CA: CPT

## 2019-11-02 PROCEDURE — 82962 GLUCOSE BLOOD TEST: CPT

## 2019-11-02 RX ORDER — DEXTROSE AND SODIUM CHLORIDE 5; .45 G/100ML; G/100ML
INJECTION, SOLUTION INTRAVENOUS CONTINUOUS
Status: DISCONTINUED | OUTPATIENT
Start: 2019-11-02 | End: 2019-11-05

## 2019-11-02 RX ADMIN — DEXTROSE AND SODIUM CHLORIDE: 5; 450 INJECTION, SOLUTION INTRAVENOUS at 08:57

## 2019-11-02 RX ADMIN — DOXYCYCLINE 100 MG: 100 INJECTION, POWDER, LYOPHILIZED, FOR SOLUTION INTRAVENOUS at 12:03

## 2019-11-02 RX ADMIN — CEFTRIAXONE SODIUM 1 G: 1 INJECTION, POWDER, FOR SOLUTION INTRAMUSCULAR; INTRAVENOUS at 12:02

## 2019-11-02 RX ADMIN — SODIUM CHLORIDE: 9 INJECTION, SOLUTION INTRAVENOUS at 01:57

## 2019-11-02 RX ADMIN — DOXYCYCLINE 100 MG: 100 INJECTION, POWDER, LYOPHILIZED, FOR SOLUTION INTRAVENOUS at 01:00

## 2019-11-02 RX ADMIN — ENOXAPARIN SODIUM 40 MG: 40 INJECTION SUBCUTANEOUS at 16:42

## 2019-11-03 ENCOUNTER — APPOINTMENT (OUTPATIENT)
Dept: GENERAL RADIOLOGY | Age: 79
DRG: 871 | End: 2019-11-03
Payer: COMMERCIAL

## 2019-11-03 LAB
ANION GAP SERPL CALCULATED.3IONS-SCNC: 11 MMOL/L (ref 7–16)
BASOPHILS ABSOLUTE: 0.01 E9/L (ref 0–0.2)
BASOPHILS RELATIVE PERCENT: 0.2 % (ref 0–2)
BUN BLDV-MCNC: 11 MG/DL (ref 8–23)
CALCIUM SERPL-MCNC: 8.9 MG/DL (ref 8.6–10.2)
CHLORIDE BLD-SCNC: 110 MMOL/L (ref 98–107)
CO2: 20 MMOL/L (ref 22–29)
CREAT SERPL-MCNC: 0.7 MG/DL (ref 0.7–1.2)
EOSINOPHILS ABSOLUTE: 0.15 E9/L (ref 0.05–0.5)
EOSINOPHILS RELATIVE PERCENT: 2.9 % (ref 0–6)
GFR AFRICAN AMERICAN: >60
GFR NON-AFRICAN AMERICAN: >60 ML/MIN/1.73
GLUCOSE BLD-MCNC: 160 MG/DL (ref 74–99)
HCT VFR BLD CALC: 32.1 % (ref 37–54)
HEMOGLOBIN: 10.7 G/DL (ref 12.5–16.5)
IMMATURE GRANULOCYTES #: 0.03 E9/L
IMMATURE GRANULOCYTES %: 0.6 % (ref 0–5)
LYMPHOCYTES ABSOLUTE: 1.4 E9/L (ref 1.5–4)
LYMPHOCYTES RELATIVE PERCENT: 26.8 % (ref 20–42)
MCH RBC QN AUTO: 29.5 PG (ref 26–35)
MCHC RBC AUTO-ENTMCNC: 33.3 % (ref 32–34.5)
MCV RBC AUTO: 88.4 FL (ref 80–99.9)
METER GLUCOSE: 135 MG/DL (ref 74–99)
METER GLUCOSE: 145 MG/DL (ref 74–99)
METER GLUCOSE: 154 MG/DL (ref 74–99)
METER GLUCOSE: 154 MG/DL (ref 74–99)
MONOCYTES ABSOLUTE: 0.39 E9/L (ref 0.1–0.95)
MONOCYTES RELATIVE PERCENT: 7.5 % (ref 2–12)
NEUTROPHILS ABSOLUTE: 3.24 E9/L (ref 1.8–7.3)
NEUTROPHILS RELATIVE PERCENT: 62 % (ref 43–80)
PDW BLD-RTO: 13.2 FL (ref 11.5–15)
PLATELET # BLD: 188 E9/L (ref 130–450)
PMV BLD AUTO: 10.6 FL (ref 7–12)
POTASSIUM REFLEX MAGNESIUM: 4 MMOL/L (ref 3.5–5)
RBC # BLD: 3.63 E12/L (ref 3.8–5.8)
SODIUM BLD-SCNC: 141 MMOL/L (ref 132–146)
WBC # BLD: 5.2 E9/L (ref 4.5–11.5)

## 2019-11-03 PROCEDURE — 71045 X-RAY EXAM CHEST 1 VIEW: CPT

## 2019-11-03 PROCEDURE — 6360000002 HC RX W HCPCS: Performed by: INTERNAL MEDICINE

## 2019-11-03 PROCEDURE — 82962 GLUCOSE BLOOD TEST: CPT

## 2019-11-03 PROCEDURE — 6370000000 HC RX 637 (ALT 250 FOR IP): Performed by: NURSE PRACTITIONER

## 2019-11-03 PROCEDURE — 1200000000 HC SEMI PRIVATE

## 2019-11-03 PROCEDURE — 2580000003 HC RX 258: Performed by: INTERNAL MEDICINE

## 2019-11-03 PROCEDURE — 99233 SBSQ HOSP IP/OBS HIGH 50: CPT | Performed by: INTERNAL MEDICINE

## 2019-11-03 PROCEDURE — 85025 COMPLETE CBC W/AUTO DIFF WBC: CPT

## 2019-11-03 PROCEDURE — 36415 COLL VENOUS BLD VENIPUNCTURE: CPT

## 2019-11-03 PROCEDURE — 80048 BASIC METABOLIC PNL TOTAL CA: CPT

## 2019-11-03 PROCEDURE — 2500000003 HC RX 250 WO HCPCS: Performed by: INTERNAL MEDICINE

## 2019-11-03 PROCEDURE — 6370000000 HC RX 637 (ALT 250 FOR IP): Performed by: INTERNAL MEDICINE

## 2019-11-03 RX ORDER — PANTOPRAZOLE SODIUM 40 MG/1
40 GRANULE, DELAYED RELEASE ORAL
Status: DISCONTINUED | OUTPATIENT
Start: 2019-11-03 | End: 2019-11-07 | Stop reason: HOSPADM

## 2019-11-03 RX ORDER — ACETAMINOPHEN 650 MG/1
650 SUPPOSITORY RECTAL EVERY 4 HOURS PRN
Status: DISCONTINUED | OUTPATIENT
Start: 2019-11-03 | End: 2019-11-07 | Stop reason: HOSPADM

## 2019-11-03 RX ADMIN — INSULIN LISPRO 1 UNITS: 100 INJECTION, SOLUTION INTRAVENOUS; SUBCUTANEOUS at 11:12

## 2019-11-03 RX ADMIN — DOXYCYCLINE 100 MG: 100 INJECTION, POWDER, LYOPHILIZED, FOR SOLUTION INTRAVENOUS at 00:27

## 2019-11-03 RX ADMIN — PANTOPRAZOLE SODIUM 40 MG: 40 GRANULE, DELAYED RELEASE ORAL at 06:04

## 2019-11-03 RX ADMIN — PIPERACILLIN AND TAZOBACTAM 3.38 G: 3; .375 INJECTION, POWDER, LYOPHILIZED, FOR SOLUTION INTRAVENOUS at 15:43

## 2019-11-03 RX ADMIN — ENOXAPARIN SODIUM 40 MG: 40 INJECTION SUBCUTANEOUS at 16:48

## 2019-11-03 RX ADMIN — PIPERACILLIN AND TAZOBACTAM 3.38 G: 3; .375 INJECTION, POWDER, LYOPHILIZED, FOR SOLUTION INTRAVENOUS at 22:25

## 2019-11-03 RX ADMIN — DEXTROSE AND SODIUM CHLORIDE: 5; 450 INJECTION, SOLUTION INTRAVENOUS at 03:46

## 2019-11-03 RX ADMIN — DOXYCYCLINE 100 MG: 100 INJECTION, POWDER, LYOPHILIZED, FOR SOLUTION INTRAVENOUS at 12:31

## 2019-11-03 RX ADMIN — ACETAMINOPHEN 650 MG: 650 SUPPOSITORY RECTAL at 16:49

## 2019-11-03 RX ADMIN — Medication 10 ML: at 22:30

## 2019-11-03 RX ADMIN — INSULIN LISPRO 1 UNITS: 100 INJECTION, SOLUTION INTRAVENOUS; SUBCUTANEOUS at 08:25

## 2019-11-03 ASSESSMENT — PAIN DESCRIPTION - DESCRIPTORS: DESCRIPTORS: HEADACHE

## 2019-11-03 ASSESSMENT — PAIN SCALES - GENERAL
PAINLEVEL_OUTOF10: 0
PAINLEVEL_OUTOF10: 2
PAINLEVEL_OUTOF10: 5
PAINLEVEL_OUTOF10: 0

## 2019-11-03 ASSESSMENT — PAIN DESCRIPTION - LOCATION: LOCATION: HEAD

## 2019-11-04 ENCOUNTER — APPOINTMENT (OUTPATIENT)
Dept: GENERAL RADIOLOGY | Age: 79
DRG: 871 | End: 2019-11-04
Payer: COMMERCIAL

## 2019-11-04 LAB
ANION GAP SERPL CALCULATED.3IONS-SCNC: 7 MMOL/L (ref 7–16)
BASOPHILS ABSOLUTE: 0.02 E9/L (ref 0–0.2)
BASOPHILS RELATIVE PERCENT: 0.4 % (ref 0–2)
BLOOD CULTURE, ROUTINE: NORMAL
BUN BLDV-MCNC: 8 MG/DL (ref 8–23)
CALCIUM SERPL-MCNC: 8.7 MG/DL (ref 8.6–10.2)
CHLORIDE BLD-SCNC: 112 MMOL/L (ref 98–107)
CO2: 26 MMOL/L (ref 22–29)
CREAT SERPL-MCNC: 0.9 MG/DL (ref 0.7–1.2)
CULTURE, BLOOD 2: NORMAL
EOSINOPHILS ABSOLUTE: 0.18 E9/L (ref 0.05–0.5)
EOSINOPHILS RELATIVE PERCENT: 3.4 % (ref 0–6)
GFR AFRICAN AMERICAN: >60
GFR NON-AFRICAN AMERICAN: >60 ML/MIN/1.73
GLUCOSE BLD-MCNC: 115 MG/DL (ref 74–99)
HCT VFR BLD CALC: 31.3 % (ref 37–54)
HEMOGLOBIN: 10.5 G/DL (ref 12.5–16.5)
IMMATURE GRANULOCYTES #: 0.02 E9/L
IMMATURE GRANULOCYTES %: 0.4 % (ref 0–5)
LYMPHOCYTES ABSOLUTE: 1.59 E9/L (ref 1.5–4)
LYMPHOCYTES RELATIVE PERCENT: 29.9 % (ref 20–42)
MAGNESIUM: 1.5 MG/DL (ref 1.6–2.6)
MCH RBC QN AUTO: 29.4 PG (ref 26–35)
MCHC RBC AUTO-ENTMCNC: 33.5 % (ref 32–34.5)
MCV RBC AUTO: 87.7 FL (ref 80–99.9)
METER GLUCOSE: 106 MG/DL (ref 74–99)
METER GLUCOSE: 127 MG/DL (ref 74–99)
METER GLUCOSE: 132 MG/DL (ref 74–99)
METER GLUCOSE: 136 MG/DL (ref 74–99)
MONOCYTES ABSOLUTE: 0.37 E9/L (ref 0.1–0.95)
MONOCYTES RELATIVE PERCENT: 7 % (ref 2–12)
NEUTROPHILS ABSOLUTE: 3.14 E9/L (ref 1.8–7.3)
NEUTROPHILS RELATIVE PERCENT: 58.9 % (ref 43–80)
PDW BLD-RTO: 13.3 FL (ref 11.5–15)
PLATELET # BLD: 207 E9/L (ref 130–450)
PMV BLD AUTO: 10.7 FL (ref 7–12)
POTASSIUM REFLEX MAGNESIUM: 3.4 MMOL/L (ref 3.5–5)
RBC # BLD: 3.57 E12/L (ref 3.8–5.8)
SODIUM BLD-SCNC: 145 MMOL/L (ref 132–146)
WBC # BLD: 5.3 E9/L (ref 4.5–11.5)

## 2019-11-04 PROCEDURE — 6360000002 HC RX W HCPCS: Performed by: INTERNAL MEDICINE

## 2019-11-04 PROCEDURE — 74230 X-RAY XM SWLNG FUNCJ C+: CPT

## 2019-11-04 PROCEDURE — 92526 ORAL FUNCTION THERAPY: CPT | Performed by: SPEECH-LANGUAGE PATHOLOGIST

## 2019-11-04 PROCEDURE — 97110 THERAPEUTIC EXERCISES: CPT

## 2019-11-04 PROCEDURE — 36415 COLL VENOUS BLD VENIPUNCTURE: CPT

## 2019-11-04 PROCEDURE — 1200000000 HC SEMI PRIVATE

## 2019-11-04 PROCEDURE — 85025 COMPLETE CBC W/AUTO DIFF WBC: CPT

## 2019-11-04 PROCEDURE — 80048 BASIC METABOLIC PNL TOTAL CA: CPT

## 2019-11-04 PROCEDURE — 2580000003 HC RX 258: Performed by: INTERNAL MEDICINE

## 2019-11-04 PROCEDURE — 2500000003 HC RX 250 WO HCPCS: Performed by: NURSE PRACTITIONER

## 2019-11-04 PROCEDURE — 82962 GLUCOSE BLOOD TEST: CPT

## 2019-11-04 PROCEDURE — 92611 MOTION FLUOROSCOPY/SWALLOW: CPT | Performed by: SPEECH-LANGUAGE PATHOLOGIST

## 2019-11-04 PROCEDURE — 83735 ASSAY OF MAGNESIUM: CPT

## 2019-11-04 PROCEDURE — 97116 GAIT TRAINING THERAPY: CPT

## 2019-11-04 PROCEDURE — 99233 SBSQ HOSP IP/OBS HIGH 50: CPT | Performed by: INTERNAL MEDICINE

## 2019-11-04 RX ORDER — POTASSIUM CHLORIDE 7.45 MG/ML
10 INJECTION INTRAVENOUS ONCE
Status: COMPLETED | OUTPATIENT
Start: 2019-11-04 | End: 2019-11-04

## 2019-11-04 RX ORDER — MAGNESIUM SULFATE IN WATER 40 MG/ML
2 INJECTION, SOLUTION INTRAVENOUS ONCE
Status: COMPLETED | OUTPATIENT
Start: 2019-11-04 | End: 2019-11-04

## 2019-11-04 RX ADMIN — PIPERACILLIN AND TAZOBACTAM 3.38 G: 3; .375 INJECTION, POWDER, LYOPHILIZED, FOR SOLUTION INTRAVENOUS at 22:28

## 2019-11-04 RX ADMIN — MAGNESIUM SULFATE HEPTAHYDRATE 2 G: 40 INJECTION, SOLUTION INTRAVENOUS at 12:55

## 2019-11-04 RX ADMIN — PIPERACILLIN AND TAZOBACTAM 3.38 G: 3; .375 INJECTION, POWDER, LYOPHILIZED, FOR SOLUTION INTRAVENOUS at 05:42

## 2019-11-04 RX ADMIN — POTASSIUM CHLORIDE 10 MEQ: 7.46 INJECTION, SOLUTION INTRAVENOUS at 11:34

## 2019-11-04 RX ADMIN — ENOXAPARIN SODIUM 40 MG: 40 INJECTION SUBCUTANEOUS at 17:42

## 2019-11-04 RX ADMIN — BARIUM SULFATE 45 G: 0.6 CREAM ORAL at 10:43

## 2019-11-04 RX ADMIN — PIPERACILLIN AND TAZOBACTAM 3.38 G: 3; .375 INJECTION, POWDER, LYOPHILIZED, FOR SOLUTION INTRAVENOUS at 14:46

## 2019-11-04 RX ADMIN — BARIUM SULFATE 88 G: 960 POWDER, FOR SUSPENSION ORAL at 10:43

## 2019-11-04 RX ADMIN — DEXTROSE AND SODIUM CHLORIDE: 5; 450 INJECTION, SOLUTION INTRAVENOUS at 08:31

## 2019-11-04 ASSESSMENT — PAIN SCALES - GENERAL
PAINLEVEL_OUTOF10: 0
PAINLEVEL_OUTOF10: 0

## 2019-11-05 LAB
ANION GAP SERPL CALCULATED.3IONS-SCNC: 10 MMOL/L (ref 7–16)
BASOPHILS ABSOLUTE: 0.01 E9/L (ref 0–0.2)
BASOPHILS RELATIVE PERCENT: 0.2 % (ref 0–2)
BUN BLDV-MCNC: 7 MG/DL (ref 8–23)
CALCIUM SERPL-MCNC: 8.6 MG/DL (ref 8.6–10.2)
CHLORIDE BLD-SCNC: 104 MMOL/L (ref 98–107)
CO2: 25 MMOL/L (ref 22–29)
CREAT SERPL-MCNC: 0.8 MG/DL (ref 0.7–1.2)
EOSINOPHILS ABSOLUTE: 0.17 E9/L (ref 0.05–0.5)
EOSINOPHILS RELATIVE PERCENT: 3.1 % (ref 0–6)
GFR AFRICAN AMERICAN: >60
GFR NON-AFRICAN AMERICAN: >60 ML/MIN/1.73
GLUCOSE BLD-MCNC: 124 MG/DL (ref 74–99)
HCT VFR BLD CALC: 31.1 % (ref 37–54)
HEMOGLOBIN: 10.5 G/DL (ref 12.5–16.5)
IMMATURE GRANULOCYTES #: 0.02 E9/L
IMMATURE GRANULOCYTES %: 0.4 % (ref 0–5)
LYMPHOCYTES ABSOLUTE: 1.75 E9/L (ref 1.5–4)
LYMPHOCYTES RELATIVE PERCENT: 31.6 % (ref 20–42)
MAGNESIUM: 1.9 MG/DL (ref 1.6–2.6)
MCH RBC QN AUTO: 29.2 PG (ref 26–35)
MCHC RBC AUTO-ENTMCNC: 33.8 % (ref 32–34.5)
MCV RBC AUTO: 86.6 FL (ref 80–99.9)
METER GLUCOSE: 121 MG/DL (ref 74–99)
METER GLUCOSE: 128 MG/DL (ref 74–99)
METER GLUCOSE: 176 MG/DL (ref 74–99)
METER GLUCOSE: 255 MG/DL (ref 74–99)
MONOCYTES ABSOLUTE: 0.44 E9/L (ref 0.1–0.95)
MONOCYTES RELATIVE PERCENT: 8 % (ref 2–12)
NEUTROPHILS ABSOLUTE: 3.14 E9/L (ref 1.8–7.3)
NEUTROPHILS RELATIVE PERCENT: 56.7 % (ref 43–80)
PDW BLD-RTO: 13.3 FL (ref 11.5–15)
PLATELET # BLD: 199 E9/L (ref 130–450)
PMV BLD AUTO: 10.4 FL (ref 7–12)
POTASSIUM REFLEX MAGNESIUM: 3.5 MMOL/L (ref 3.5–5)
RBC # BLD: 3.59 E12/L (ref 3.8–5.8)
SODIUM BLD-SCNC: 139 MMOL/L (ref 132–146)
WBC # BLD: 5.5 E9/L (ref 4.5–11.5)

## 2019-11-05 PROCEDURE — 1200000000 HC SEMI PRIVATE

## 2019-11-05 PROCEDURE — 6360000002 HC RX W HCPCS: Performed by: INTERNAL MEDICINE

## 2019-11-05 PROCEDURE — 92526 ORAL FUNCTION THERAPY: CPT

## 2019-11-05 PROCEDURE — 80048 BASIC METABOLIC PNL TOTAL CA: CPT

## 2019-11-05 PROCEDURE — 97530 THERAPEUTIC ACTIVITIES: CPT

## 2019-11-05 PROCEDURE — 2500000003 HC RX 250 WO HCPCS: Performed by: INTERNAL MEDICINE

## 2019-11-05 PROCEDURE — 97110 THERAPEUTIC EXERCISES: CPT

## 2019-11-05 PROCEDURE — 99223 1ST HOSP IP/OBS HIGH 75: CPT | Performed by: NURSE PRACTITIONER

## 2019-11-05 PROCEDURE — 99233 SBSQ HOSP IP/OBS HIGH 50: CPT | Performed by: INTERNAL MEDICINE

## 2019-11-05 PROCEDURE — 2580000003 HC RX 258: Performed by: INTERNAL MEDICINE

## 2019-11-05 PROCEDURE — 6370000000 HC RX 637 (ALT 250 FOR IP): Performed by: INTERNAL MEDICINE

## 2019-11-05 PROCEDURE — 85025 COMPLETE CBC W/AUTO DIFF WBC: CPT

## 2019-11-05 PROCEDURE — 82962 GLUCOSE BLOOD TEST: CPT

## 2019-11-05 PROCEDURE — 36415 COLL VENOUS BLD VENIPUNCTURE: CPT

## 2019-11-05 PROCEDURE — 83735 ASSAY OF MAGNESIUM: CPT

## 2019-11-05 RX ORDER — DEXTROSE, SODIUM CHLORIDE, AND POTASSIUM CHLORIDE 5; .45; .15 G/100ML; G/100ML; G/100ML
INJECTION INTRAVENOUS CONTINUOUS
Status: DISCONTINUED | OUTPATIENT
Start: 2019-11-05 | End: 2019-11-07 | Stop reason: HOSPADM

## 2019-11-05 RX ADMIN — POTASSIUM CHLORIDE, DEXTROSE MONOHYDRATE AND SODIUM CHLORIDE: 150; 5; 450 INJECTION, SOLUTION INTRAVENOUS at 20:48

## 2019-11-05 RX ADMIN — RISPERIDONE 1 MG: 1 TABLET ORAL at 20:48

## 2019-11-05 RX ADMIN — INSULIN LISPRO 1 UNITS: 100 INJECTION, SOLUTION INTRAVENOUS; SUBCUTANEOUS at 20:52

## 2019-11-05 RX ADMIN — POTASSIUM CHLORIDE, DEXTROSE MONOHYDRATE AND SODIUM CHLORIDE: 150; 5; 450 INJECTION, SOLUTION INTRAVENOUS at 14:07

## 2019-11-05 RX ADMIN — INSULIN LISPRO 3 UNITS: 100 INJECTION, SOLUTION INTRAVENOUS; SUBCUTANEOUS at 16:54

## 2019-11-05 RX ADMIN — PIPERACILLIN AND TAZOBACTAM 3.38 G: 3; .375 INJECTION, POWDER, LYOPHILIZED, FOR SOLUTION INTRAVENOUS at 06:15

## 2019-11-05 RX ADMIN — Medication 10 ML: at 14:07

## 2019-11-05 RX ADMIN — ENOXAPARIN SODIUM 40 MG: 40 INJECTION SUBCUTANEOUS at 16:53

## 2019-11-05 RX ADMIN — DEXTROSE AND SODIUM CHLORIDE: 5; 450 INJECTION, SOLUTION INTRAVENOUS at 04:15

## 2019-11-05 ASSESSMENT — PAIN SCALES - GENERAL
PAINLEVEL_OUTOF10: 0

## 2019-11-06 LAB
ANION GAP SERPL CALCULATED.3IONS-SCNC: 7 MMOL/L (ref 7–16)
BASOPHILS ABSOLUTE: 0.02 E9/L (ref 0–0.2)
BASOPHILS RELATIVE PERCENT: 0.4 % (ref 0–2)
BUN BLDV-MCNC: 5 MG/DL (ref 8–23)
CALCIUM SERPL-MCNC: 8.6 MG/DL (ref 8.6–10.2)
CHLORIDE BLD-SCNC: 112 MMOL/L (ref 98–107)
CO2: 25 MMOL/L (ref 22–29)
CREAT SERPL-MCNC: 0.7 MG/DL (ref 0.7–1.2)
EOSINOPHILS ABSOLUTE: 0.2 E9/L (ref 0.05–0.5)
EOSINOPHILS RELATIVE PERCENT: 3.7 % (ref 0–6)
GFR AFRICAN AMERICAN: >60
GFR NON-AFRICAN AMERICAN: >60 ML/MIN/1.73
GLUCOSE BLD-MCNC: 165 MG/DL (ref 74–99)
HCT VFR BLD CALC: 31.6 % (ref 37–54)
HEMOGLOBIN: 10.4 G/DL (ref 12.5–16.5)
IMMATURE GRANULOCYTES #: 0.04 E9/L
IMMATURE GRANULOCYTES %: 0.7 % (ref 0–5)
LYMPHOCYTES ABSOLUTE: 1.64 E9/L (ref 1.5–4)
LYMPHOCYTES RELATIVE PERCENT: 30.7 % (ref 20–42)
MCH RBC QN AUTO: 29 PG (ref 26–35)
MCHC RBC AUTO-ENTMCNC: 32.9 % (ref 32–34.5)
MCV RBC AUTO: 88 FL (ref 80–99.9)
METER GLUCOSE: 162 MG/DL (ref 74–99)
METER GLUCOSE: 189 MG/DL (ref 74–99)
METER GLUCOSE: 218 MG/DL (ref 74–99)
MONOCYTES ABSOLUTE: 0.4 E9/L (ref 0.1–0.95)
MONOCYTES RELATIVE PERCENT: 7.5 % (ref 2–12)
NEUTROPHILS ABSOLUTE: 3.05 E9/L (ref 1.8–7.3)
NEUTROPHILS RELATIVE PERCENT: 57 % (ref 43–80)
PDW BLD-RTO: 13.6 FL (ref 11.5–15)
PLATELET # BLD: 192 E9/L (ref 130–450)
PMV BLD AUTO: 10.6 FL (ref 7–12)
POTASSIUM REFLEX MAGNESIUM: 4 MMOL/L (ref 3.5–5)
RBC # BLD: 3.59 E12/L (ref 3.8–5.8)
SODIUM BLD-SCNC: 144 MMOL/L (ref 132–146)
WBC # BLD: 5.4 E9/L (ref 4.5–11.5)

## 2019-11-06 PROCEDURE — 36415 COLL VENOUS BLD VENIPUNCTURE: CPT

## 2019-11-06 PROCEDURE — 99233 SBSQ HOSP IP/OBS HIGH 50: CPT | Performed by: INTERNAL MEDICINE

## 2019-11-06 PROCEDURE — 80048 BASIC METABOLIC PNL TOTAL CA: CPT

## 2019-11-06 PROCEDURE — 2500000003 HC RX 250 WO HCPCS: Performed by: INTERNAL MEDICINE

## 2019-11-06 PROCEDURE — 6370000000 HC RX 637 (ALT 250 FOR IP): Performed by: INTERNAL MEDICINE

## 2019-11-06 PROCEDURE — 97110 THERAPEUTIC EXERCISES: CPT

## 2019-11-06 PROCEDURE — 97530 THERAPEUTIC ACTIVITIES: CPT

## 2019-11-06 PROCEDURE — 6360000002 HC RX W HCPCS: Performed by: INTERNAL MEDICINE

## 2019-11-06 PROCEDURE — 2580000003 HC RX 258: Performed by: INTERNAL MEDICINE

## 2019-11-06 PROCEDURE — 82962 GLUCOSE BLOOD TEST: CPT

## 2019-11-06 PROCEDURE — 1200000000 HC SEMI PRIVATE

## 2019-11-06 PROCEDURE — 85025 COMPLETE CBC W/AUTO DIFF WBC: CPT

## 2019-11-06 RX ADMIN — INSULIN LISPRO 1 UNITS: 100 INJECTION, SOLUTION INTRAVENOUS; SUBCUTANEOUS at 21:47

## 2019-11-06 RX ADMIN — POTASSIUM CHLORIDE, DEXTROSE MONOHYDRATE AND SODIUM CHLORIDE: 150; 5; 450 INJECTION, SOLUTION INTRAVENOUS at 20:13

## 2019-11-06 RX ADMIN — POTASSIUM CHLORIDE, DEXTROSE MONOHYDRATE AND SODIUM CHLORIDE: 150; 5; 450 INJECTION, SOLUTION INTRAVENOUS at 10:14

## 2019-11-06 RX ADMIN — RISPERIDONE 1 MG: 1 TABLET ORAL at 21:13

## 2019-11-06 RX ADMIN — ENOXAPARIN SODIUM 40 MG: 40 INJECTION SUBCUTANEOUS at 17:11

## 2019-11-06 RX ADMIN — Medication 10 ML: at 10:14

## 2019-11-06 RX ADMIN — POTASSIUM CHLORIDE, DEXTROSE MONOHYDRATE AND SODIUM CHLORIDE: 150; 5; 450 INJECTION, SOLUTION INTRAVENOUS at 00:23

## 2019-11-06 RX ADMIN — INSULIN LISPRO 2 UNITS: 100 INJECTION, SOLUTION INTRAVENOUS; SUBCUTANEOUS at 17:40

## 2019-11-06 ASSESSMENT — PAIN SCALES - GENERAL
PAINLEVEL_OUTOF10: 0
PAINLEVEL_OUTOF10: 4

## 2019-11-06 ASSESSMENT — PAIN DESCRIPTION - LOCATION: LOCATION: LEG

## 2019-11-07 VITALS
SYSTOLIC BLOOD PRESSURE: 167 MMHG | TEMPERATURE: 97.9 F | DIASTOLIC BLOOD PRESSURE: 75 MMHG | WEIGHT: 115 LBS | OXYGEN SATURATION: 98 % | HEIGHT: 68 IN | HEART RATE: 67 BPM | RESPIRATION RATE: 16 BRPM | BODY MASS INDEX: 17.43 KG/M2

## 2019-11-07 LAB
ANION GAP SERPL CALCULATED.3IONS-SCNC: 10 MMOL/L (ref 7–16)
BASOPHILS ABSOLUTE: 0.02 E9/L (ref 0–0.2)
BASOPHILS RELATIVE PERCENT: 0.4 % (ref 0–2)
BUN BLDV-MCNC: 5 MG/DL (ref 8–23)
CALCIUM SERPL-MCNC: 8.5 MG/DL (ref 8.6–10.2)
CHLORIDE BLD-SCNC: 109 MMOL/L (ref 98–107)
CO2: 21 MMOL/L (ref 22–29)
CREAT SERPL-MCNC: 0.7 MG/DL (ref 0.7–1.2)
EOSINOPHILS ABSOLUTE: 0.19 E9/L (ref 0.05–0.5)
EOSINOPHILS RELATIVE PERCENT: 3.4 % (ref 0–6)
GFR AFRICAN AMERICAN: >60
GFR NON-AFRICAN AMERICAN: >60 ML/MIN/1.73
GLUCOSE BLD-MCNC: 189 MG/DL (ref 74–99)
HCT VFR BLD CALC: 31.6 % (ref 37–54)
HEMOGLOBIN: 10.6 G/DL (ref 12.5–16.5)
IMMATURE GRANULOCYTES #: 0.05 E9/L
IMMATURE GRANULOCYTES %: 0.9 % (ref 0–5)
LYMPHOCYTES ABSOLUTE: 1.77 E9/L (ref 1.5–4)
LYMPHOCYTES RELATIVE PERCENT: 31.4 % (ref 20–42)
MCH RBC QN AUTO: 29.7 PG (ref 26–35)
MCHC RBC AUTO-ENTMCNC: 33.5 % (ref 32–34.5)
MCV RBC AUTO: 88.5 FL (ref 80–99.9)
METER GLUCOSE: 156 MG/DL (ref 74–99)
METER GLUCOSE: 172 MG/DL (ref 74–99)
MONOCYTES ABSOLUTE: 0.45 E9/L (ref 0.1–0.95)
MONOCYTES RELATIVE PERCENT: 8 % (ref 2–12)
NEUTROPHILS ABSOLUTE: 3.16 E9/L (ref 1.8–7.3)
NEUTROPHILS RELATIVE PERCENT: 55.9 % (ref 43–80)
PDW BLD-RTO: 13.6 FL (ref 11.5–15)
PLATELET # BLD: 189 E9/L (ref 130–450)
PMV BLD AUTO: 10.7 FL (ref 7–12)
POTASSIUM REFLEX MAGNESIUM: 4.2 MMOL/L (ref 3.5–5)
RBC # BLD: 3.57 E12/L (ref 3.8–5.8)
SODIUM BLD-SCNC: 140 MMOL/L (ref 132–146)
WBC # BLD: 5.6 E9/L (ref 4.5–11.5)

## 2019-11-07 PROCEDURE — 6370000000 HC RX 637 (ALT 250 FOR IP): Performed by: NURSE PRACTITIONER

## 2019-11-07 PROCEDURE — 36415 COLL VENOUS BLD VENIPUNCTURE: CPT

## 2019-11-07 PROCEDURE — 99239 HOSP IP/OBS DSCHRG MGMT >30: CPT | Performed by: INTERNAL MEDICINE

## 2019-11-07 PROCEDURE — 80048 BASIC METABOLIC PNL TOTAL CA: CPT

## 2019-11-07 PROCEDURE — 82962 GLUCOSE BLOOD TEST: CPT

## 2019-11-07 PROCEDURE — 6370000000 HC RX 637 (ALT 250 FOR IP): Performed by: INTERNAL MEDICINE

## 2019-11-07 PROCEDURE — 2500000003 HC RX 250 WO HCPCS: Performed by: INTERNAL MEDICINE

## 2019-11-07 PROCEDURE — 85025 COMPLETE CBC W/AUTO DIFF WBC: CPT

## 2019-11-07 RX ADMIN — PANTOPRAZOLE SODIUM 40 MG: 40 GRANULE, DELAYED RELEASE ORAL at 05:45

## 2019-11-07 RX ADMIN — POTASSIUM CHLORIDE, DEXTROSE MONOHYDRATE AND SODIUM CHLORIDE: 150; 5; 450 INJECTION, SOLUTION INTRAVENOUS at 05:44

## 2019-11-07 RX ADMIN — INSULIN LISPRO 1 UNITS: 100 INJECTION, SOLUTION INTRAVENOUS; SUBCUTANEOUS at 09:18

## 2019-11-07 ASSESSMENT — PAIN SCALES - GENERAL
PAINLEVEL_OUTOF10: 0
PAINLEVEL_OUTOF10: 0

## 2020-01-06 ENCOUNTER — APPOINTMENT (OUTPATIENT)
Dept: GENERAL RADIOLOGY | Age: 80
DRG: 871 | End: 2020-01-06
Payer: COMMERCIAL

## 2020-01-06 ENCOUNTER — APPOINTMENT (OUTPATIENT)
Dept: CT IMAGING | Age: 80
DRG: 871 | End: 2020-01-06
Payer: COMMERCIAL

## 2020-01-06 ENCOUNTER — HOSPITAL ENCOUNTER (INPATIENT)
Age: 80
LOS: 3 days | Discharge: HOSPICE | DRG: 871 | End: 2020-01-09
Attending: EMERGENCY MEDICINE | Admitting: INTERNAL MEDICINE
Payer: COMMERCIAL

## 2020-01-06 LAB
ALBUMIN SERPL-MCNC: 2.8 G/DL (ref 3.5–5.2)
ALP BLD-CCNC: 86 U/L (ref 40–129)
ALT SERPL-CCNC: 17 U/L (ref 0–40)
ANION GAP SERPL CALCULATED.3IONS-SCNC: 12 MMOL/L (ref 7–16)
ANION GAP SERPL CALCULATED.3IONS-SCNC: 15 MMOL/L (ref 7–16)
ANION GAP SERPL CALCULATED.3IONS-SCNC: 28 MMOL/L (ref 7–16)
APPEARANCE CSF: CLEAR
AST SERPL-CCNC: 17 U/L (ref 0–39)
B.E.: -5.4 MMOL/L (ref -3–3)
BASOPHILS ABSOLUTE: 0 E9/L (ref 0–0.2)
BASOPHILS RELATIVE PERCENT: 0 % (ref 0–2)
BETA-HYDROXYBUTYRATE: >4.5 MMOL/L (ref 0.02–0.27)
BILIRUB SERPL-MCNC: 0.5 MG/DL (ref 0–1.2)
BILIRUBIN URINE: ABNORMAL
BLOOD, URINE: NEGATIVE
BUN BLDV-MCNC: 53 MG/DL (ref 8–23)
BUN BLDV-MCNC: 61 MG/DL (ref 8–23)
BUN BLDV-MCNC: 74 MG/DL (ref 8–23)
CALCIUM SERPL-MCNC: 10.7 MG/DL (ref 8.6–10.2)
CALCIUM SERPL-MCNC: 9.2 MG/DL (ref 8.6–10.2)
CALCIUM SERPL-MCNC: 9.4 MG/DL (ref 8.6–10.2)
CHLORIDE BLD-SCNC: 108 MMOL/L (ref 98–107)
CHLORIDE BLD-SCNC: 117 MMOL/L (ref 98–107)
CHLORIDE BLD-SCNC: 119 MMOL/L (ref 98–107)
CLARITY: CLEAR
CO2: 14 MMOL/L (ref 22–29)
CO2: 15 MMOL/L (ref 22–29)
CO2: 20 MMOL/L (ref 22–29)
CO2: 20 MMOL/L (ref 22–29)
COLOR CSF: COLORLESS
COLOR: YELLOW
CREAT SERPL-MCNC: 1.3 MG/DL (ref 0.7–1.2)
CREAT SERPL-MCNC: 1.6 MG/DL (ref 0.7–1.2)
CREAT SERPL-MCNC: 1.9 MG/DL (ref 0.7–1.2)
DEVICE: ABNORMAL
EOSINOPHILS ABSOLUTE: 0 E9/L (ref 0.05–0.5)
EOSINOPHILS RELATIVE PERCENT: 0 % (ref 0–6)
GFR AFRICAN AMERICAN: 41
GFR AFRICAN AMERICAN: 41
GFR AFRICAN AMERICAN: 50
GFR AFRICAN AMERICAN: >60
GFR NON-AFRICAN AMERICAN: 34 ML/MIN/1.73
GFR NON-AFRICAN AMERICAN: 34 ML/MIN/1.73
GFR NON-AFRICAN AMERICAN: 42 ML/MIN/1.73
GFR NON-AFRICAN AMERICAN: 53 ML/MIN/1.73
GLUCOSE BLD-MCNC: 193 MG/DL (ref 74–99)
GLUCOSE BLD-MCNC: 218 MG/DL (ref 74–99)
GLUCOSE BLD-MCNC: 372 MG/DL (ref 74–99)
GLUCOSE BLD-MCNC: 385 MG/DL (ref 74–99)
GLUCOSE URINE: 100 MG/DL
GLUCOSE, CSF: 176 MG/DL (ref 40–70)
HCO3 ARTERIAL: 15.8 MMOL/L (ref 22–26)
HCT VFR BLD CALC: 50.3 % (ref 37–54)
HEMOGLOBIN: 15.7 G/DL (ref 12.5–16.5)
INFLUENZA A BY PCR: NOT DETECTED
INFLUENZA B BY PCR: NOT DETECTED
KETONES, URINE: ABNORMAL MG/DL
LACTIC ACID, SEPSIS: 3.4 MMOL/L (ref 0.5–1.9)
LEUKOCYTE ESTERASE, URINE: NEGATIVE
LYMPHOCYTES ABSOLUTE: 0.96 E9/L (ref 1.5–4)
LYMPHOCYTES RELATIVE PERCENT: 4 % (ref 20–42)
MAGNESIUM: 1.6 MG/DL (ref 1.6–2.6)
MAGNESIUM: 1.7 MG/DL (ref 1.6–2.6)
MAGNESIUM: 1.9 MG/DL (ref 1.6–2.6)
MCH RBC QN AUTO: 29.1 PG (ref 26–35)
MCHC RBC AUTO-ENTMCNC: 31.2 % (ref 32–34.5)
MCV RBC AUTO: 93.1 FL (ref 80–99.9)
METER GLUCOSE: 141 MG/DL (ref 74–99)
METER GLUCOSE: 205 MG/DL (ref 74–99)
METER GLUCOSE: 210 MG/DL (ref 74–99)
METER GLUCOSE: 211 MG/DL (ref 74–99)
METER GLUCOSE: 266 MG/DL (ref 74–99)
METER GLUCOSE: 294 MG/DL (ref 74–99)
METER GLUCOSE: 315 MG/DL (ref 74–99)
METER GLUCOSE: 347 MG/DL (ref 74–99)
MONOCYTE, CSF: 100 % (ref 10–70)
MONOCYTES ABSOLUTE: 0.72 E9/L (ref 0.1–0.95)
MONOCYTES RELATIVE PERCENT: 3 % (ref 2–12)
NEUTROPHILS ABSOLUTE: 22.32 E9/L (ref 1.8–7.3)
NEUTROPHILS RELATIVE PERCENT: 93 % (ref 43–80)
NEUTROPHILS, CSF: 0 % (ref 0–10)
NITRITE, URINE: NEGATIVE
O2 SATURATION: 96.2 % (ref 92–98.5)
OPERATOR ID: 4809
PCO2 ARTERIAL: 22.1 MMHG (ref 35–45)
PDW BLD-RTO: 12.9 FL (ref 11.5–15)
PH BLOOD GAS: 7.46 (ref 7.35–7.45)
PH UA: 5 (ref 5–9)
PHOSPHORUS: 1.4 MG/DL (ref 2.5–4.5)
PHOSPHORUS: 2 MG/DL (ref 2.5–4.5)
PLATELET # BLD: 399 E9/L (ref 130–450)
PMV BLD AUTO: 11.6 FL (ref 7–12)
PO2 ARTERIAL: 75.4 MMHG (ref 60–80)
POC ANION GAP: 15 MMOL/L (ref 7–16)
POC BUN: 66 MG/DL (ref 8–23)
POC CHLORIDE: 119 MMOL/L (ref 100–108)
POC CREATININE: 1.9 MG/DL (ref 0.7–1.2)
POC POTASSIUM: 4.7 MMOL/L (ref 3.5–5)
POC SODIUM: 149 MMOL/L (ref 132–146)
POTASSIUM SERPL-SCNC: 3.2 MMOL/L (ref 3.5–5)
POTASSIUM SERPL-SCNC: 3.4 MMOL/L (ref 3.5–5)
POTASSIUM SERPL-SCNC: 4.7 MMOL/L (ref 3.5–5)
PRO-BNP: 2739 PG/ML (ref 0–450)
PROTEIN CSF: 85 MG/DL (ref 15–40)
PROTEIN UA: NEGATIVE MG/DL
RBC # BLD: 5.4 E12/L (ref 3.8–5.8)
RBC CSF: <2000 /UL
SODIUM BLD-SCNC: 150 MMOL/L (ref 132–146)
SODIUM BLD-SCNC: 151 MMOL/L (ref 132–146)
SODIUM BLD-SCNC: 152 MMOL/L (ref 132–146)
SOURCE, BLOOD GAS: ABNORMAL
SPECIFIC GRAVITY UA: 1.02 (ref 1–1.03)
TOTAL PROTEIN: 7.4 G/DL (ref 6.4–8.3)
TROPONIN: 0.02 NG/ML (ref 0–0.03)
TROPONIN: 0.03 NG/ML (ref 0–0.03)
TUBE NUMBER CSF: ABNORMAL
UROBILINOGEN, URINE: 0.2 E.U./DL
WBC # BLD: 24 E9/L (ref 4.5–11.5)
WBC CSF: 3 /UL (ref 0–2)

## 2020-01-06 PROCEDURE — 87040 BLOOD CULTURE FOR BACTERIA: CPT

## 2020-01-06 PROCEDURE — 0100U HC RESPIRPTHGN MULT REV TRANS & AMP PRB TECH 21 TRGT: CPT

## 2020-01-06 PROCEDURE — 81003 URINALYSIS AUTO W/O SCOPE: CPT

## 2020-01-06 PROCEDURE — 6360000002 HC RX W HCPCS: Performed by: INTERNAL MEDICINE

## 2020-01-06 PROCEDURE — 84484 ASSAY OF TROPONIN QUANT: CPT

## 2020-01-06 PROCEDURE — 2580000003 HC RX 258: Performed by: EMERGENCY MEDICINE

## 2020-01-06 PROCEDURE — 6360000002 HC RX W HCPCS: Performed by: EMERGENCY MEDICINE

## 2020-01-06 PROCEDURE — 82803 BLOOD GASES ANY COMBINATION: CPT

## 2020-01-06 PROCEDURE — 84520 ASSAY OF UREA NITROGEN: CPT

## 2020-01-06 PROCEDURE — 83735 ASSAY OF MAGNESIUM: CPT

## 2020-01-06 PROCEDURE — 87081 CULTURE SCREEN ONLY: CPT

## 2020-01-06 PROCEDURE — 80051 ELECTROLYTE PANEL: CPT

## 2020-01-06 PROCEDURE — 87070 CULTURE OTHR SPECIMN AEROBIC: CPT

## 2020-01-06 PROCEDURE — 82947 ASSAY GLUCOSE BLOOD QUANT: CPT

## 2020-01-06 PROCEDURE — 99233 SBSQ HOSP IP/OBS HIGH 50: CPT | Performed by: INTERNAL MEDICINE

## 2020-01-06 PROCEDURE — 36415 COLL VENOUS BLD VENIPUNCTURE: CPT

## 2020-01-06 PROCEDURE — 84157 ASSAY OF PROTEIN OTHER: CPT

## 2020-01-06 PROCEDURE — 87502 INFLUENZA DNA AMP PROBE: CPT

## 2020-01-06 PROCEDURE — 2500000003 HC RX 250 WO HCPCS: Performed by: EMERGENCY MEDICINE

## 2020-01-06 PROCEDURE — 74176 CT ABD & PELVIS W/O CONTRAST: CPT

## 2020-01-06 PROCEDURE — 70450 CT HEAD/BRAIN W/O DYE: CPT

## 2020-01-06 PROCEDURE — 87529 HSV DNA AMP PROBE: CPT

## 2020-01-06 PROCEDURE — 84100 ASSAY OF PHOSPHORUS: CPT

## 2020-01-06 PROCEDURE — 2000000000 HC ICU R&B

## 2020-01-06 PROCEDURE — 87205 SMEAR GRAM STAIN: CPT

## 2020-01-06 PROCEDURE — 6360000002 HC RX W HCPCS

## 2020-01-06 PROCEDURE — 83605 ASSAY OF LACTIC ACID: CPT

## 2020-01-06 PROCEDURE — 87450 HC DIRECT STREP B ANTIGEN: CPT

## 2020-01-06 PROCEDURE — 85025 COMPLETE CBC W/AUTO DIFF WBC: CPT

## 2020-01-06 PROCEDURE — 99285 EMERGENCY DEPT VISIT HI MDM: CPT

## 2020-01-06 PROCEDURE — 82010 KETONE BODYS QUAN: CPT

## 2020-01-06 PROCEDURE — 82945 GLUCOSE OTHER FLUID: CPT

## 2020-01-06 PROCEDURE — 71045 X-RAY EXAM CHEST 1 VIEW: CPT

## 2020-01-06 PROCEDURE — 2580000003 HC RX 258: Performed by: INTERNAL MEDICINE

## 2020-01-06 PROCEDURE — 87088 URINE BACTERIA CULTURE: CPT

## 2020-01-06 PROCEDURE — 89051 BODY FLUID CELL COUNT: CPT

## 2020-01-06 PROCEDURE — 80048 BASIC METABOLIC PNL TOTAL CA: CPT

## 2020-01-06 PROCEDURE — 80053 COMPREHEN METABOLIC PANEL: CPT

## 2020-01-06 PROCEDURE — 82962 GLUCOSE BLOOD TEST: CPT

## 2020-01-06 PROCEDURE — 82565 ASSAY OF CREATININE: CPT

## 2020-01-06 PROCEDURE — 6370000000 HC RX 637 (ALT 250 FOR IP): Performed by: EMERGENCY MEDICINE

## 2020-01-06 PROCEDURE — 83880 ASSAY OF NATRIURETIC PEPTIDE: CPT

## 2020-01-06 RX ORDER — GUAIFENESIN 400 MG/1
400 TABLET ORAL DAILY
Status: ON HOLD | COMMUNITY
End: 2020-01-09 | Stop reason: HOSPADM

## 2020-01-06 RX ORDER — ALBUTEROL SULFATE 2.5 MG/3ML
2.5 SOLUTION RESPIRATORY (INHALATION) 4 TIMES DAILY
COMMUNITY

## 2020-01-06 RX ORDER — 0.9 % SODIUM CHLORIDE 0.9 %
1000 INTRAVENOUS SOLUTION INTRAVENOUS ONCE
Status: COMPLETED | OUTPATIENT
Start: 2020-01-06 | End: 2020-01-06

## 2020-01-06 RX ORDER — SODIUM CHLORIDE 0.9 % (FLUSH) 0.9 %
10 SYRINGE (ML) INJECTION PRN
Status: DISCONTINUED | OUTPATIENT
Start: 2020-01-06 | End: 2020-01-09 | Stop reason: HOSPADM

## 2020-01-06 RX ORDER — NALOXONE HYDROCHLORIDE 1 MG/ML
INJECTION INTRAMUSCULAR; INTRAVENOUS; SUBCUTANEOUS
Status: COMPLETED
Start: 2020-01-06 | End: 2020-01-06

## 2020-01-06 RX ORDER — SODIUM CHLORIDE 0.9 % (FLUSH) 0.9 %
10 SYRINGE (ML) INJECTION EVERY 12 HOURS SCHEDULED
Status: DISCONTINUED | OUTPATIENT
Start: 2020-01-06 | End: 2020-01-09 | Stop reason: HOSPADM

## 2020-01-06 RX ORDER — LIDOCAINE HYDROCHLORIDE 20 MG/ML
JELLY TOPICAL
Status: DISPENSED
Start: 2020-01-06 | End: 2020-01-07

## 2020-01-06 RX ORDER — 0.9 % SODIUM CHLORIDE 0.9 %
500 INTRAVENOUS SOLUTION INTRAVENOUS ONCE
Status: COMPLETED | OUTPATIENT
Start: 2020-01-06 | End: 2020-01-06

## 2020-01-06 RX ORDER — NALOXONE HYDROCHLORIDE 1 MG/ML
2 INJECTION INTRAMUSCULAR; INTRAVENOUS; SUBCUTANEOUS ONCE
Status: COMPLETED | OUTPATIENT
Start: 2020-01-06 | End: 2020-01-06

## 2020-01-06 RX ORDER — MIRTAZAPINE 15 MG/1
15 TABLET, FILM COATED ORAL NIGHTLY
COMMUNITY

## 2020-01-06 RX ORDER — LORAZEPAM 0.5 MG/1
0.5 TABLET ORAL EVERY 4 HOURS PRN
Status: ON HOLD | COMMUNITY
End: 2020-01-09 | Stop reason: HOSPADM

## 2020-01-06 RX ORDER — MAGNESIUM SULFATE 1 G/100ML
1 INJECTION INTRAVENOUS PRN
Status: DISCONTINUED | OUTPATIENT
Start: 2020-01-06 | End: 2020-01-09 | Stop reason: HOSPADM

## 2020-01-06 RX ORDER — DEXTROSE MONOHYDRATE 25 G/50ML
12.5 INJECTION, SOLUTION INTRAVENOUS PRN
Status: DISCONTINUED | OUTPATIENT
Start: 2020-01-06 | End: 2020-01-09 | Stop reason: HOSPADM

## 2020-01-06 RX ORDER — SODIUM CHLORIDE 9 MG/ML
INJECTION, SOLUTION INTRAVENOUS EVERY 8 HOURS
Status: COMPLETED | OUTPATIENT
Start: 2020-01-07 | End: 2020-01-07

## 2020-01-06 RX ORDER — DEXTROSE, SODIUM CHLORIDE, AND POTASSIUM CHLORIDE 5; .45; .15 G/100ML; G/100ML; G/100ML
INJECTION INTRAVENOUS CONTINUOUS PRN
Status: DISCONTINUED | OUTPATIENT
Start: 2020-01-06 | End: 2020-01-07

## 2020-01-06 RX ORDER — FENTANYL CITRATE 50 UG/ML
25 INJECTION, SOLUTION INTRAMUSCULAR; INTRAVENOUS ONCE
Status: COMPLETED | OUTPATIENT
Start: 2020-01-06 | End: 2020-01-06

## 2020-01-06 RX ORDER — POTASSIUM CHLORIDE 7.45 MG/ML
10 INJECTION INTRAVENOUS PRN
Status: DISCONTINUED | OUTPATIENT
Start: 2020-01-06 | End: 2020-01-08

## 2020-01-06 RX ORDER — ONDANSETRON 2 MG/ML
4 INJECTION INTRAMUSCULAR; INTRAVENOUS EVERY 6 HOURS PRN
Status: DISCONTINUED | OUTPATIENT
Start: 2020-01-06 | End: 2020-01-09 | Stop reason: HOSPADM

## 2020-01-06 RX ORDER — SENNA PLUS 8.6 MG/1
1 TABLET ORAL DAILY PRN
COMMUNITY

## 2020-01-06 RX ORDER — MORPHINE SULFATE 100 MG/5ML
2.5 SOLUTION ORAL EVERY 4 HOURS PRN
Status: ON HOLD | COMMUNITY
End: 2020-01-09 | Stop reason: HOSPADM

## 2020-01-06 RX ORDER — PANTOPRAZOLE SODIUM 40 MG/1
40 TABLET, DELAYED RELEASE ORAL DAILY
Status: ON HOLD | COMMUNITY
End: 2020-01-09 | Stop reason: HOSPADM

## 2020-01-06 RX ORDER — SODIUM CHLORIDE 9 MG/ML
INJECTION, SOLUTION INTRAVENOUS CONTINUOUS
Status: DISCONTINUED | OUTPATIENT
Start: 2020-01-06 | End: 2020-01-06

## 2020-01-06 RX ORDER — PANTOPRAZOLE SODIUM 40 MG/10ML
40 INJECTION, POWDER, LYOPHILIZED, FOR SOLUTION INTRAVENOUS DAILY
Status: DISCONTINUED | OUTPATIENT
Start: 2020-01-07 | End: 2020-01-09 | Stop reason: HOSPADM

## 2020-01-06 RX ADMIN — SODIUM CHLORIDE, PRESERVATIVE FREE 10 ML: 5 INJECTION INTRAVENOUS at 23:19

## 2020-01-06 RX ADMIN — SODIUM CHLORIDE 0.1 UNITS/KG/HR: 9 INJECTION, SOLUTION INTRAVENOUS at 15:21

## 2020-01-06 RX ADMIN — ACYCLOVIR SODIUM 500 MG: 50 INJECTION, SOLUTION INTRAVENOUS at 15:27

## 2020-01-06 RX ADMIN — POTASSIUM CHLORIDE, DEXTROSE MONOHYDRATE AND SODIUM CHLORIDE 150 ML/HR: 150; 5; 450 INJECTION, SOLUTION INTRAVENOUS at 19:38

## 2020-01-06 RX ADMIN — SODIUM CHLORIDE 500 ML: 9 INJECTION, SOLUTION INTRAVENOUS at 15:35

## 2020-01-06 RX ADMIN — PIPERACILLIN AND TAZOBACTAM 3.38 G: 3; .375 INJECTION, POWDER, LYOPHILIZED, FOR SOLUTION INTRAVENOUS at 23:18

## 2020-01-06 RX ADMIN — SODIUM CHLORIDE, PRESERVATIVE FREE 10 ML: 5 INJECTION INTRAVENOUS at 21:00

## 2020-01-06 RX ADMIN — POTASSIUM CHLORIDE 10 MEQ: 10 INJECTION, SOLUTION INTRAVENOUS at 19:39

## 2020-01-06 RX ADMIN — NALOXONE HYDROCHLORIDE 2 MG: 1 INJECTION INTRAMUSCULAR; INTRAVENOUS; SUBCUTANEOUS at 12:32

## 2020-01-06 RX ADMIN — SODIUM CHLORIDE: 9 INJECTION, SOLUTION INTRAVENOUS at 15:58

## 2020-01-06 RX ADMIN — NALOXONE HYDROCHLORIDE 2 MG: 1 INJECTION PARENTERAL at 12:32

## 2020-01-06 RX ADMIN — FENTANYL CITRATE 25 MCG: 50 INJECTION, SOLUTION INTRAMUSCULAR; INTRAVENOUS at 17:33

## 2020-01-06 RX ADMIN — VANCOMYCIN HYDROCHLORIDE 1250 MG: 10 INJECTION, POWDER, LYOPHILIZED, FOR SOLUTION INTRAVENOUS at 15:26

## 2020-01-06 RX ADMIN — SODIUM CHLORIDE 1000 ML: 9 INJECTION, SOLUTION INTRAVENOUS at 13:30

## 2020-01-06 RX ADMIN — MEROPENEM 1 G: 1 INJECTION, POWDER, FOR SOLUTION INTRAVENOUS at 15:25

## 2020-01-06 ASSESSMENT — PAIN SCALES - PAIN ASSESSMENT IN ADVANCED DEMENTIA (PAINAD)
CONSOLABILITY: 1
TOTALSCORE: 5
BODYLANGUAGE: 1
FACIALEXPRESSION: 2
BREATHING: 0
NEGVOCALIZATION: 1

## 2020-01-06 ASSESSMENT — PAIN SCALES - GENERAL
PAINLEVEL_OUTOF10: 5
PAINLEVEL_OUTOF10: 8

## 2020-01-06 NOTE — H&P
([facility]     Diabetes mellitus ([facility]     Hyperlipidemia     Hypertension        Surgical History:  Past Surgical History:   Procedure Laterality Date    CARDIAC SURGERY      UPPER GASTROINTESTINAL ENDOSCOPY N/A 6/19/2019    EGD BIOPSY performed by Hugh Calix MD at [place] [address]       Medications Prior to Admission:    Prior to Admission medications    Medication Sig Start Date End Date Taking? Authorizing Provider   metFORMIN (GLUCOPHAGE) 1000 MG tablet Take 1,000 mg by mouth 2 times daily (with meals)   Yes Historical Provider, MD   LORazepam (ATIVAN) 0.5 MG tablet Take 0.5 mg by mouth every 4 hours as needed for Anxiety (Agitation). Yes Historical Provider, MD   senna (SENOKOT) 8.6 MG tablet Take 1 tablet by mouth daily as needed for Constipation   Yes Historical Provider, MD   mirtazapine (REMERON) 15 MG tablet Take 15 mg by mouth nightly   Yes Historical Provider, MD   pantoprazole (PROTONIX) 40 MG tablet Take 40 mg by mouth daily   Yes Historical Provider, MD   guaiFENesin 400 MG tablet Take 400 mg by mouth daily    Yes Historical Provider, MD   morphine sulfate 20 MG/ML concentrated oral solution Take 2.5 mg by mouth every 4 hours as needed for Pain (Shortness of Breath). Yes Historical Provider, MD   albuterol (PROVENTIL) (2.5 MG/3ML) 0.083% nebulizer solution Take 2.5 mg by nebulization 4 times daily   Yes Historical Provider, MD   risperiDONE (RISPERDAL) 1 MG tablet Take 1 mg by mouth daily    Yes Historical Provider, MD       Allergies:    Patient has no known allergies. Social History:    reports that he has never smoked. He has never used smokeless tobacco. He reports current alcohol use. He reports that he does not use drugs. Family History:   family history is not on file.   Unable to obtain family history    PHYSICAL EXAM:  Vitals:  /79   Pulse 127   Temp 99.9 °F (37.7 °C) (Core)   Resp 22   Ht 5' 8\" (1.727 m)   Wt 110 lb (49.9 kg)   SpO2 100%   BMI 16.73 kg/m² common bile duct is normal in caliber. Pancreas: Unremarkable. Spleen: Unremarkable. Gastrointestinal: The distal esophagus, stomach, duodenum are normal in appearance. The small bowel loops are normal in caliber. The appendix is not identified, however there is no pericecal inflammatory process. The colon is normal in caliber. There is a moderate amount of stool throughout the colon and rectum. Adrenal glands: Unremarkable. Genitourinary: Bilateral kidneys are normal in morphology and symmetric in size. There is no hydronephrosis or hydroureter bilaterally. The urinary bladder is partially collapsed around a Grimes catheter. The prostate is grossly unremarkable. Vascular: There is mild scattered atherosclerotic plaque. The abdominal aorta is normal in caliber. Lymph nodes: No abdominal, retroperitoneal, or pelvic lymphadenopathy. Bones: No suspicious lytic or blastic osseous lesion. Soft tissues: Unremarkable. 1. Bilateral lower lobe dependent consolidations, likely pneumonia. 2. Moderate amount stool within the colon and rectum. Ct Head Wo Contrast    Result Date: 1/6/2020  LOCATION: 200 EXAM: CT HEAD WO CONTRAST COMPARISON: None HISTORY: Confusion TECHNIQUE: Noncontrast helical CT images were performed of the head. Coronal and sagittal reconstructions also obtained. Automated dose control was used for this exam. CONTRAST: No intravenous contrast administered. FINDINGS: There is severe diffuse atrophy. Severe amount of patchy periventricular and subcortical white matter hypodensity noted, likely chronic microvascular ischemic related. There is no evidence of intracranial hemorrhage or mass. No extra-axial fluid is seen. The paranasal sinuses are clear. The globes and orbits are normal.  No abnormalities of the calvarium are identified. Chronic ischemic changes with no evidence of hemorrhage or mass.     Xr Chest Portable    Result Date: 1/6/2020  LOCATION: 200 EXAM: XR CHEST PORTABLE COMPARISON: None HISTORY: Shortness of breath TECHNIQUE: Single frontal view of the chest was obtained. FINDINGS:  SUPPORT DEVICES: None. LUNGS: Clear with no areas of consolidation. PLEURA: No pneumothorax visualized. LUNG VOLUMES: Satisfactory inspirator effort. MEDIASTINAL STRUCTURES: No lymphadenopathy. Normal aortic contour. HEART SIZE: Normal. BONES AND SOFT TISSUES: No fracture or soft tissue abnormality. 1. Negative portable chest.          ASSESSMENT:      Active Problems:    Sepsis ([address]  Resolved Problems:    * No resolved hospital problems. *      PLAN:    1. Acute Respiratory Failure with Hypoxia: Likely secondary to bilateral lower lobe pneumonia. CT imaging shows B/L lower lobe dependent consolidation suspicious for pneumonia. Currently, patient on 15 L via nonrebreather mask and maintaining adequate SPO2 greater than 90%. Continue antibiotic therapy and wean O2 as tolerated. Critical care consulted. 2. Sepsis secondary to Community Acquired Pneumonia: Tmax 100.5; WBC 24. Lactic acid 3.4.  Strep and Legionella antigen ordered. Respiratory viral panel and influenza A/B screen ordered.  Blood, sputum, and urine cultures pending. Patient underwent lumbar puncture in ER with the unremarkable results as follows:Clear appearance, Glu 176, Protein 85, WBC 3. Continue IV fluids and antibiotic therapy. 3. DKA: Blood Glucose 372, Beta Hydroxybutyrate >4.5. Likely secondary to sepsis. Monitor BMP every 4 hours and BS q hourly. Continue IV fluids and insulin drip as per DKA protocol. 4. CAITIE: Cr 1.9 (baseline 0.8) Likely secondary to sepsis. Continue IV fluid hydration. Continue to monitor creatinine and urine output; avoid nephrotoxins. 5. Acute Metabolic Encephalopathy: Likely secondary to sepsis. Continue antibiotic therapy and will continue to monitor clinically. 6. Hx of Dysphagia:   During prior admission, patient failed swallow study but patient refused PEG tube at that time.   Patient was

## 2020-01-06 NOTE — ED NOTES
Lumbar puncture performed. Large area of open skin breakdown noted on coccyx. Area cleaned and covered.  CSF delivered to shakira Wolfe RN  01/06/20 0681

## 2020-01-06 NOTE — ED PROVIDER NOTES
LESTER Garcia is a 78 y.o. male presenting to the ED for altered mental status, beginning proxy 1 hour before arrival today. Patient's family states that for seeing this morning he was at baseline however after leaving for some ice cream and coming back home he was altered and not responding to questions. The patient has a history of being on hospice care however . The complaint has been persistent, severe in severity, and worsened by nothing. Patient is currently in the care of family member who is the power of . Upon arrival the patient was only responsive to pain, however he was protecting his airway. Review of Systems   Unable to perform ROS: Mental status change        Physical Exam  Vitals signs and nursing note reviewed. Constitutional:       General: He is in acute distress. Appearance: He is well-developed. He is ill-appearing and diaphoretic. Comments: Cachectic appearing   HENT:      Head: Normocephalic and atraumatic. Nose: Nose normal. No congestion. Mouth/Throat:      Mouth: Mucous membranes are moist.      Pharynx: Oropharyngeal exudate present. Eyes:      Pupils: Pupils are equal, round, and reactive to light. Neck:      Musculoskeletal: Normal range of motion and neck supple. Cardiovascular:      Rate and Rhythm: Regular rhythm. Tachycardia present. Heart sounds: Normal heart sounds. No murmur. Pulmonary:      Effort: Pulmonary effort is normal.      Breath sounds: Normal breath sounds. No wheezing or rales. Abdominal:      General: Bowel sounds are normal.      Palpations: Abdomen is soft. Tenderness: There is no tenderness. There is no guarding or rebound. Skin:     General: Skin is warm. Neurological:      Mental Status: He is alert. He is disoriented. Procedures     PROCEDURE NOTE  1/7/20       Time: 1550    LUMBAR PUNCTURE  Risks, benefits and alternatives (for applicable procedures below) described.    Performed By: Candice Moya DO. Indication: Suspected meningitis and to obtain spinal fluid for diagnostic testing. Informed consent obtained: Unable to be obtained due to patient's condition. .  Procedure:  After right lateral decubitus positioning and sterile preparation local anesthetic was injected and a 20g spinal needle was inserted in the L5 interspace. Lumbar puncture was successful. Opening pressure in mm/H20 was not examined. Approximately 4 ml of clear cerebral spinal fluid was removed. Number of Attempts: 1  Patient tolerated the procedure well. Complications:  None. MDM     The Patient is a 78 y.o. male who presented to the ED for mental status, the patient was found to have septicemia, pneumonia, DKA. Lab workup found hyperglycemia at 347, elevated CSF glucose as well as elevated CSF protein, leukocytosis at 24, was a lactate of 3.4 with a proBNP of 2739, Imaging bilateral lower lobe dependent consolidation likely pneumonia. Patient will be admitted to the ICU for DKA management. Patient warned of signs to look for to determine the need to return to the Emergency Department for further workup. Accordingly this patient received 72 minutes of critical care time, excluding separately billable procedures. --------------------------------------------- PAST HISTORY ---------------------------------------------  Past Medical History:  has a past medical history of Cerebral artery occlusion with cerebral infarction ([facility] Diabetes mellitus ([facility] Hyperlipidemia, and Hypertension. Past Surgical History:  has a past surgical history that includes Cardiac surgery and Upper gastrointestinal endoscopy (N/A, 6/19/2019). Social History:  reports that he has never smoked. He has never used smokeless tobacco. He reports current alcohol use. He reports that he does not use drugs. Family History: family history is not on file.      The patients home medications have been
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
## 2020-01-06 NOTE — CARE COORDINATION
Emergency Department Social Work Assessment:    Pt presents to the ED from home, with unresponsiveness. Pt is active with [facility]. SW called pt's hospice RN/casemandominik Adkins to discuss, no answer- left voicemail. SW following to assist with disposition.     Electronically signed by AMANDA Dumont, LSW on 1/6/2020 at 1:15 PM

## 2020-01-07 PROBLEM — J18.9 PNEUMONIA: Status: ACTIVE | Noted: 2020-01-07

## 2020-01-07 PROBLEM — L89.90 DECUBITUS SKIN ULCER: Status: ACTIVE | Noted: 2020-01-07

## 2020-01-07 PROBLEM — N17.9 ACUTE RENAL FAILURE (ARF) (HCC): Status: ACTIVE | Noted: 2020-01-07

## 2020-01-07 LAB
ADENOVIRUS BY PCR: NOT DETECTED
ANION GAP SERPL CALCULATED.3IONS-SCNC: 12 MMOL/L (ref 7–16)
ANION GAP SERPL CALCULATED.3IONS-SCNC: 14 MMOL/L (ref 7–16)
BASOPHILS ABSOLUTE: 0 E9/L (ref 0–0.2)
BASOPHILS RELATIVE PERCENT: 0 % (ref 0–2)
BORDETELLA PARAPERTUSSIS BY PCR: NOT DETECTED
BORDETELLA PERTUSSIS BY PCR: NOT DETECTED
BUN BLDV-MCNC: 42 MG/DL (ref 8–23)
BUN BLDV-MCNC: 47 MG/DL (ref 8–23)
CALCIUM SERPL-MCNC: 8.9 MG/DL (ref 8.6–10.2)
CALCIUM SERPL-MCNC: 8.9 MG/DL (ref 8.6–10.2)
CHLAMYDOPHILIA PNEUMONIAE BY PCR: NOT DETECTED
CHLORIDE BLD-SCNC: 116 MMOL/L (ref 98–107)
CHLORIDE BLD-SCNC: 118 MMOL/L (ref 98–107)
CO2: 19 MMOL/L (ref 22–29)
CO2: 20 MMOL/L (ref 22–29)
CORONAVIRUS 229E BY PCR: NOT DETECTED
CORONAVIRUS HKU1 BY PCR: NOT DETECTED
CORONAVIRUS NL63 BY PCR: NOT DETECTED
CORONAVIRUS OC43 BY PCR: NOT DETECTED
CREAT SERPL-MCNC: 1.2 MG/DL (ref 0.7–1.2)
CREAT SERPL-MCNC: 1.3 MG/DL (ref 0.7–1.2)
EOSINOPHILS ABSOLUTE: 0 E9/L (ref 0.05–0.5)
EOSINOPHILS RELATIVE PERCENT: 0 % (ref 0–6)
GFR AFRICAN AMERICAN: >60
GFR AFRICAN AMERICAN: >60
GFR NON-AFRICAN AMERICAN: 53 ML/MIN/1.73
GFR NON-AFRICAN AMERICAN: 58 ML/MIN/1.73
GLUCOSE BLD-MCNC: 170 MG/DL (ref 74–99)
GLUCOSE BLD-MCNC: 287 MG/DL (ref 74–99)
HCT VFR BLD CALC: 38.5 % (ref 37–54)
HEMOGLOBIN: 12.2 G/DL (ref 12.5–16.5)
HUMAN METAPNEUMOVIRUS BY PCR: NOT DETECTED
HUMAN RHINOVIRUS/ENTEROVIRUS BY PCR: NOT DETECTED
INFLUENZA A BY PCR: NOT DETECTED
INFLUENZA B BY PCR: NOT DETECTED
L. PNEUMOPHILA SEROGP 1 UR AG: NORMAL
LACTIC ACID: 2.5 MMOL/L (ref 0.5–2.2)
LYMPHOCYTES ABSOLUTE: 0.36 E9/L (ref 1.5–4)
LYMPHOCYTES RELATIVE PERCENT: 2 % (ref 20–42)
MAGNESIUM: 2 MG/DL (ref 1.6–2.6)
MAGNESIUM: 2.3 MG/DL (ref 1.6–2.6)
MCH RBC QN AUTO: 29.2 PG (ref 26–35)
MCHC RBC AUTO-ENTMCNC: 31.7 % (ref 32–34.5)
MCV RBC AUTO: 92.1 FL (ref 80–99.9)
METAMYELOCYTES RELATIVE PERCENT: 4 % (ref 0–1)
METER GLUCOSE: 108 MG/DL (ref 74–99)
METER GLUCOSE: 129 MG/DL (ref 74–99)
METER GLUCOSE: 145 MG/DL (ref 74–99)
METER GLUCOSE: 147 MG/DL (ref 74–99)
METER GLUCOSE: 170 MG/DL (ref 74–99)
METER GLUCOSE: 171 MG/DL (ref 74–99)
METER GLUCOSE: 175 MG/DL (ref 74–99)
METER GLUCOSE: 190 MG/DL (ref 74–99)
METER GLUCOSE: 203 MG/DL (ref 74–99)
METER GLUCOSE: 500 MG/DL (ref 74–99)
MONOCYTES ABSOLUTE: 0.54 E9/L (ref 0.1–0.95)
MONOCYTES RELATIVE PERCENT: 3 % (ref 2–12)
MYCOPLASMA PNEUMONIAE BY PCR: NOT DETECTED
MYELOCYTE PERCENT: 4 % (ref 0–0)
NEUTROPHILS ABSOLUTE: 17.01 E9/L (ref 1.8–7.3)
NEUTROPHILS RELATIVE PERCENT: 87 % (ref 43–80)
OVALOCYTES: ABNORMAL
PARAINFLUENZA VIRUS 1 BY PCR: NOT DETECTED
PARAINFLUENZA VIRUS 2 BY PCR: NOT DETECTED
PARAINFLUENZA VIRUS 3 BY PCR: NOT DETECTED
PARAINFLUENZA VIRUS 4 BY PCR: NOT DETECTED
PDW BLD-RTO: 12.9 FL (ref 11.5–15)
PHOSPHORUS: 2.5 MG/DL (ref 2.5–4.5)
PHOSPHORUS: 2.6 MG/DL (ref 2.5–4.5)
PLATELET # BLD: 211 E9/L (ref 130–450)
PMV BLD AUTO: 11.2 FL (ref 7–12)
POIKILOCYTES: ABNORMAL
POTASSIUM SERPL-SCNC: 4.9 MMOL/L (ref 3.5–5)
POTASSIUM SERPL-SCNC: 5.6 MMOL/L (ref 3.5–5)
PREALBUMIN: 5 MG/DL (ref 20–40)
RBC # BLD: 4.18 E12/L (ref 3.8–5.8)
RESPIRATORY SYNCYTIAL VIRUS BY PCR: NOT DETECTED
SODIUM BLD-SCNC: 149 MMOL/L (ref 132–146)
SODIUM BLD-SCNC: 150 MMOL/L (ref 132–146)
STREP PNEUMONIAE ANTIGEN, URINE: NORMAL
TROPONIN: 0.01 NG/ML (ref 0–0.03)
VANCOMYCIN RANDOM: 8.3 MCG/ML (ref 5–40)
WBC # BLD: 17.9 E9/L (ref 4.5–11.5)

## 2020-01-07 PROCEDURE — 83605 ASSAY OF LACTIC ACID: CPT

## 2020-01-07 PROCEDURE — 84100 ASSAY OF PHOSPHORUS: CPT

## 2020-01-07 PROCEDURE — 80048 BASIC METABOLIC PNL TOTAL CA: CPT

## 2020-01-07 PROCEDURE — 1200000000 HC SEMI PRIVATE

## 2020-01-07 PROCEDURE — 87206 SMEAR FLUORESCENT/ACID STAI: CPT

## 2020-01-07 PROCEDURE — 87070 CULTURE OTHR SPECIMN AEROBIC: CPT

## 2020-01-07 PROCEDURE — 85025 COMPLETE CBC W/AUTO DIFF WBC: CPT

## 2020-01-07 PROCEDURE — 2580000003 HC RX 258: Performed by: INTERNAL MEDICINE

## 2020-01-07 PROCEDURE — 84134 ASSAY OF PREALBUMIN: CPT

## 2020-01-07 PROCEDURE — 99223 1ST HOSP IP/OBS HIGH 75: CPT | Performed by: NURSE PRACTITIONER

## 2020-01-07 PROCEDURE — 2000000000 HC ICU R&B

## 2020-01-07 PROCEDURE — 36415 COLL VENOUS BLD VENIPUNCTURE: CPT

## 2020-01-07 PROCEDURE — 84484 ASSAY OF TROPONIN QUANT: CPT

## 2020-01-07 PROCEDURE — 31720 CLEARANCE OF AIRWAYS: CPT

## 2020-01-07 PROCEDURE — 83735 ASSAY OF MAGNESIUM: CPT

## 2020-01-07 PROCEDURE — 2500000003 HC RX 250 WO HCPCS: Performed by: INTERNAL MEDICINE

## 2020-01-07 PROCEDURE — 82962 GLUCOSE BLOOD TEST: CPT

## 2020-01-07 PROCEDURE — 009U3ZX DRAINAGE OF SPINAL CANAL, PERCUTANEOUS APPROACH, DIAGNOSTIC: ICD-10-PCS | Performed by: INTERNAL MEDICINE

## 2020-01-07 PROCEDURE — C9113 INJ PANTOPRAZOLE SODIUM, VIA: HCPCS | Performed by: INTERNAL MEDICINE

## 2020-01-07 PROCEDURE — 6360000002 HC RX W HCPCS: Performed by: INTERNAL MEDICINE

## 2020-01-07 PROCEDURE — 99356 PR PROLONGED SVC I/P OR OBS SETTING 1ST HOUR: CPT | Performed by: NURSE PRACTITIONER

## 2020-01-07 PROCEDURE — 99233 SBSQ HOSP IP/OBS HIGH 50: CPT | Performed by: INTERNAL MEDICINE

## 2020-01-07 PROCEDURE — 80202 ASSAY OF VANCOMYCIN: CPT

## 2020-01-07 PROCEDURE — 6370000000 HC RX 637 (ALT 250 FOR IP): Performed by: INTERNAL MEDICINE

## 2020-01-07 RX ORDER — INSULIN GLARGINE 100 [IU]/ML
10 INJECTION, SOLUTION SUBCUTANEOUS ONCE
Status: COMPLETED | OUTPATIENT
Start: 2020-01-07 | End: 2020-01-07

## 2020-01-07 RX ORDER — SODIUM CHLORIDE 9 MG/ML
INJECTION, SOLUTION INTRAVENOUS EVERY 8 HOURS
Status: DISCONTINUED | OUTPATIENT
Start: 2020-01-07 | End: 2020-01-09 | Stop reason: HOSPADM

## 2020-01-07 RX ORDER — SODIUM CHLORIDE 450 MG/100ML
INJECTION, SOLUTION INTRAVENOUS CONTINUOUS
Status: DISCONTINUED | OUTPATIENT
Start: 2020-01-07 | End: 2020-01-09 | Stop reason: HOSPADM

## 2020-01-07 RX ORDER — MORPHINE SULFATE 2 MG/ML
0.5 INJECTION, SOLUTION INTRAMUSCULAR; INTRAVENOUS
Status: DISCONTINUED | OUTPATIENT
Start: 2020-01-07 | End: 2020-01-09 | Stop reason: HOSPADM

## 2020-01-07 RX ORDER — LORAZEPAM 2 MG/ML
0.5 INJECTION INTRAMUSCULAR EVERY 6 HOURS PRN
Status: DISCONTINUED | OUTPATIENT
Start: 2020-01-07 | End: 2020-01-09 | Stop reason: HOSPADM

## 2020-01-07 RX ADMIN — PIPERACILLIN AND TAZOBACTAM 3.38 G: 3; .375 INJECTION, POWDER, FOR SOLUTION INTRAVENOUS at 14:00

## 2020-01-07 RX ADMIN — VANCOMYCIN HYDROCHLORIDE 1000 MG: 1 INJECTION, POWDER, LYOPHILIZED, FOR SOLUTION INTRAVENOUS at 16:20

## 2020-01-07 RX ADMIN — PANTOPRAZOLE SODIUM 40 MG: 40 INJECTION, POWDER, FOR SOLUTION INTRAVENOUS at 08:06

## 2020-01-07 RX ADMIN — ENOXAPARIN SODIUM 30 MG: 30 INJECTION SUBCUTANEOUS at 08:05

## 2020-01-07 RX ADMIN — POTASSIUM CHLORIDE, DEXTROSE MONOHYDRATE AND SODIUM CHLORIDE: 150; 5; 450 INJECTION, SOLUTION INTRAVENOUS at 02:34

## 2020-01-07 RX ADMIN — SODIUM CHLORIDE, PRESERVATIVE FREE 10 ML: 5 INJECTION INTRAVENOUS at 08:26

## 2020-01-07 RX ADMIN — SODIUM CHLORIDE, PRESERVATIVE FREE 10 ML: 5 INJECTION INTRAVENOUS at 16:41

## 2020-01-07 RX ADMIN — POTASSIUM CHLORIDE 10 MEQ: 10 INJECTION, SOLUTION INTRAVENOUS at 00:28

## 2020-01-07 RX ADMIN — SODIUM CHLORIDE: 900 INJECTION, SOLUTION INTRAVENOUS at 10:05

## 2020-01-07 RX ADMIN — POTASSIUM CHLORIDE 10 MEQ: 10 INJECTION, SOLUTION INTRAVENOUS at 01:33

## 2020-01-07 RX ADMIN — PIPERACILLIN AND TAZOBACTAM 3.38 G: 3; .375 INJECTION, POWDER, FOR SOLUTION INTRAVENOUS at 22:04

## 2020-01-07 RX ADMIN — POTASSIUM CHLORIDE 10 MEQ: 10 INJECTION, SOLUTION INTRAVENOUS at 06:56

## 2020-01-07 RX ADMIN — MORPHINE SULFATE 0.5 MG: 2 INJECTION, SOLUTION INTRAMUSCULAR; INTRAVENOUS at 19:52

## 2020-01-07 RX ADMIN — SODIUM CHLORIDE: 9 INJECTION, SOLUTION INTRAVENOUS at 02:00

## 2020-01-07 RX ADMIN — SODIUM CHLORIDE, PRESERVATIVE FREE 10 ML: 5 INJECTION INTRAVENOUS at 19:57

## 2020-01-07 RX ADMIN — MAGNESIUM SULFATE 1 G: 1 INJECTION INTRAVENOUS at 00:41

## 2020-01-07 RX ADMIN — MORPHINE SULFATE 0.5 MG: 2 INJECTION, SOLUTION INTRAMUSCULAR; INTRAVENOUS at 12:43

## 2020-01-07 RX ADMIN — INSULIN GLARGINE 10 UNITS: 100 INJECTION, SOLUTION SUBCUTANEOUS at 08:06

## 2020-01-07 RX ADMIN — SODIUM PHOSPHATE, MONOBASIC, MONOHYDRATE 20 MMOL: 276; 142 INJECTION, SOLUTION INTRAVENOUS at 00:46

## 2020-01-07 RX ADMIN — MORPHINE SULFATE 0.5 MG: 2 INJECTION, SOLUTION INTRAMUSCULAR; INTRAVENOUS at 16:39

## 2020-01-07 RX ADMIN — INSULIN LISPRO 1 UNITS: 100 INJECTION, SOLUTION INTRAVENOUS; SUBCUTANEOUS at 20:00

## 2020-01-07 RX ADMIN — PIPERACILLIN AND TAZOBACTAM 3.38 G: 3; .375 INJECTION, POWDER, LYOPHILIZED, FOR SOLUTION INTRAVENOUS at 05:34

## 2020-01-07 RX ADMIN — SODIUM CHLORIDE: 4.5 INJECTION, SOLUTION INTRAVENOUS at 17:31

## 2020-01-07 RX ADMIN — MAGNESIUM SULFATE 1 G: 1 INJECTION INTRAVENOUS at 01:47

## 2020-01-07 RX ADMIN — SODIUM CHLORIDE: 900 INJECTION, SOLUTION INTRAVENOUS at 18:50

## 2020-01-07 RX ADMIN — POTASSIUM CHLORIDE 10 MEQ: 10 INJECTION, SOLUTION INTRAVENOUS at 02:28

## 2020-01-07 RX ADMIN — SODIUM CHLORIDE: 4.5 INJECTION, SOLUTION INTRAVENOUS at 08:26

## 2020-01-07 RX ADMIN — POTASSIUM CHLORIDE 10 MEQ: 10 INJECTION, SOLUTION INTRAVENOUS at 05:50

## 2020-01-07 ASSESSMENT — PAIN SCALES - PAIN ASSESSMENT IN ADVANCED DEMENTIA (PAINAD)
BODYLANGUAGE: 1
BREATHING: 0
BODYLANGUAGE: 1
TOTALSCORE: 3
BREATHING: 0
NEGVOCALIZATION: 1
CONSOLABILITY: 1
FACIALEXPRESSION: 1
NEGVOCALIZATION: 0
BREATHING: 1
FACIALEXPRESSION: 0
BREATHING: 1
CONSOLABILITY: 1
BREATHING: 0
BREATHING: 1
BODYLANGUAGE: 1
NEGVOCALIZATION: 0
NEGVOCALIZATION: 2
CONSOLABILITY: 0
TOTALSCORE: 3
TOTALSCORE: 4
CONSOLABILITY: 0
FACIALEXPRESSION: 0
BREATHING: 0
BODYLANGUAGE: 1
FACIALEXPRESSION: 0
FACIALEXPRESSION: 1
NEGVOCALIZATION: 1
BODYLANGUAGE: 1
BREATHING: 0
NEGVOCALIZATION: 1
TOTALSCORE: 3
BODYLANGUAGE: 1
BREATHING: 0
FACIALEXPRESSION: 2
BODYLANGUAGE: 1
FACIALEXPRESSION: 1
BODYLANGUAGE: 1
FACIALEXPRESSION: 1
CONSOLABILITY: 0
NEGVOCALIZATION: 2
TOTALSCORE: 5
CONSOLABILITY: 0
CONSOLABILITY: 0
CONSOLABILITY: 1
FACIALEXPRESSION: 0
TOTALSCORE: 2
CONSOLABILITY: 1
TOTALSCORE: 5
FACIALEXPRESSION: 2
TOTALSCORE: 4
CONSOLABILITY: 1
BODYLANGUAGE: 1
TOTALSCORE: 5
BODYLANGUAGE: 1
NEGVOCALIZATION: 1
NEGVOCALIZATION: 2
BREATHING: 0
NEGVOCALIZATION: 1
TOTALSCORE: 3

## 2020-01-07 ASSESSMENT — PAIN SCALES - GENERAL
PAINLEVEL_OUTOF10: 3
PAINLEVEL_OUTOF10: 5
PAINLEVEL_OUTOF10: 3
PAINLEVEL_OUTOF10: 5
PAINLEVEL_OUTOF10: 6

## 2020-01-07 NOTE — PROGRESS NOTES
[address] [place], [place] [address]ist   Progress Note    Admitting Date and Time: 1/6/2020 12:24 PM  Admit Dx: Sepsis ([facility] [A41.9]    Subjective:    Overnight, no acute issues as per RN. Patient has become more alert and arousable to tactile and vocal stimuli however still not very verbal.  Urine output has picked up as per RN and patient is now down to 6 L via nasal cannula and maintaining adequate SPO2 greater than 90%. ROS: denies fever, chills, cp, sob, n/v, HA unless stated above.  insulin lispro  0-6 Units Subcutaneous Q6H    piperacillin-tazobactam  3.375 g Intravenous Q8H    sodium chloride flush  10 mL Intravenous 2 times per day    enoxaparin  30 mg Subcutaneous Daily    pantoprazole  40 mg Intravenous Daily    vancomycin  750 mg Intravenous Q24H     dextrose, 12.5 g, PRN  potassium chloride, 10 mEq, PRN  magnesium sulfate, 1 g, PRN  sodium phosphate IVPB, 10 mmol, PRN    Or  sodium phosphate IVPB, 15 mmol, PRN    Or  sodium phosphate IVPB, 20 mmol, PRN  sodium chloride flush, 10 mL, PRN  magnesium hydroxide, 30 mL, Daily PRN  ondansetron, 4 mg, Q6H PRN         Objective:    /60   Pulse 105   Temp 98.6 °F (37 °C) (Core)   Resp 24   Ht 5' 8\" (1.727 m)   Wt 106 lb 3.2 oz (48.2 kg)   SpO2 93%   BMI 16.15 kg/m²   General Appearance: Pale, elderly individual resting comfortably in bed; not alert but arousable to vocal and tactile stimuli.     Skin: warm and dry, pale  Head: normocephalic and atraumatic  Eyes: pupils equal, round, and reactive to light, extraocular eye movements intact, conjunctivae normal  Neck: neck supple and non tender without mass; trachea midline    Pulmonary/Chest: B/L coarse BS- no wheezes, rales or rhonchi, normal air movement, no respiratory distress  Cardiovascular: normal rate, normal S1 and S2   Abdomen: soft, tender, non-distended, normal bowel sounds, no masses or organomegaly  Extremities: no cyanosis, no clubbing and no edema  Neurologic: Lethargic and confused but more alert than yesterday         Recent Labs     01/06/20  2335 01/07/20  0425 01/07/20  0815   * 150* 149*   K 3.4* 4.9 5.6*   * 116* 118*   CO2 20* 20* 19*   BUN 53* 47* 42*   CREATININE 1.3* 1.3* 1.2   GLUCOSE 193* 287* 170*   CALCIUM 9.2 8.9 8.9       Recent Labs     01/06/20  1235 01/07/20  0515   WBC 24.0* 17.9*   RBC 5.40 4.18   HGB 15.7 12.2*   HCT 50.3 38.5   MCV 93.1 92.1   MCH 29.1 29.2   MCHC 31.2* 31.7*   RDW 12.9 12.9    211   MPV 11.6 11.2       Radiology:   CT ABDOMEN PELVIS WO CONTRAST Additional Contrast? None   Final Result   1. Bilateral lower lobe dependent consolidations, likely pneumonia. 2. Moderate amount stool within the colon and rectum. CT HEAD WO CONTRAST   Final Result      Chronic ischemic changes with no evidence of hemorrhage or mass. XR CHEST PORTABLE   Final Result   1. Negative portable chest.            Assessment:  Active Problems:    Sepsis ([address]  Resolved Problems:    * No resolved hospital problems. *      Plan:  1. Acute Respiratory Failure with Hypoxia: Likely secondary to bilateral lower lobe pneumonia. CT imaging shows B/L lower lobe dependent consolidation suspicious for pneumonia. Initially, patient was on 15 L via nonrebreather mask but now maintaining adequate SPO2 greater than 90% on 6L via HFNC. Continue antibiotic therapy and wean O2 as tolerated. Critical care consulted. 2. Sepsis secondary to Community Acquired Pneumonia: On presentation:Tmax 100.5; WBC 24. Lactic acid 3.4.  Afebrile overnight; WBC 17.9, trending down. Strep and Legionella antigen ordered. Respiratory viral panel and influenza A/B screen neg.  Blood, sputum, and urine cultures pending. Patient underwent lumbar puncture in ER with the unremarkable results as follows:Clear appearance, Glu 176, Protein 85, WBC 3. Patient sacral wounds which appear unstageable as d/w Wound Care RN. Will request Gen Surgery evaluation.  Continue IV fluids and antibiotic therapy.    3. DKA: Blood Glucose 372, Beta Hydroxybutyrate >4.5 on presentation. Likely secondary to sepsis. Monitor BMP every 4 hours and BS q hourly. On IV fluids and insulin drip as per DKA protocol.'s morning BS ~170 and anion gap closed. Will transition to subcutaneous insulin with intention to discontinue IV insulin drip after overlap for 1 to 2 hours. Continue to monitor blood sugars closely. 4. CAITIE: Cr 1.9 (baseline 0.8) on presentation. Likely secondary to sepsis. Cr 1.2, improving this morning. Continue IV fluid hydration. Continue to monitor creatinine and urine output; avoid nephrotoxins. 5. Acute Metabolic Encephalopathy: Likely secondary to sepsis. Continue antibiotic therapy and will continue to monitor clinically. 6. Hx of Dysphagia:   During prior admission, patient failed swallow study but patient refused PEG tube at that time. Patient was subsequently made DNR CC and has been on normal diet reportedly. High concern for aspiration. 7. Lactic acidosis: LA 3.4.  Repeat Lactic Acid. Continue IV fluid hydration and monitor clinically. 8. CAD: Patient followed Dr Trevon Gray in past and has history of PCI. Troponin 0.03-->0.02-->0.01, trending down. Will cycle cardiac enzymes. 9. GERD/SUP Ppx: Continue PPI therapy  10. Dementia: Patient might have sundowners.  On risperidone at home. 11. Malnutrition: His factors include history of dementia/Alzheimer's, diabetes mellitus, hypertension.  As per dietitian, \"pt w/ severe malnutrition AEB muscle/fat wasting. Pt has prior documentation of nutritional compromise r/t AMS 2/2 dementia and CVA hx w/ chronic poor po/appetite. Pt at further nutritional risk d/t increased needs for wound healing and sepsis\".  Consult to Dietary/Nutrition for tube feeds. 12. DVT prophylaxis: On Lovenox SC    NOTE: This report was transcribed using voice recognition software.  Every effort was made to ensure accuracy; however, inadvertent computerized

## 2020-01-07 NOTE — PROGRESS NOTES
Notified palliative care of new consult during this admission.  Palliative has previously followed patient

## 2020-01-07 NOTE — PROGRESS NOTES
pneumonia on CT abdomen/pelvis, likely aspiration as family had been feeding the patient at home. Vancomycin and pip/tazo initiated as patient does have recent hospital admission from 10/30/19 - 11/7/19.    · Serum creatinine today: 1.2 mg/dL; CrCl ~ 30 mL/min; baseline Scr ~ 0.7 mg/dL    Plan:  · Vancomycin 1250 mg IV x 1 given in ED yesterday @ 1527  · Random level today @ 1418 = 8.3 mcg/mL  · Re-dose vancomycin 1000 mg IV x 1 today  · Follow renal function  · Pharmacist will follow and monitor/adjust dosing as necessary      Thank you for the consult,    Whitley Huerta, BroD, BCPS 1/7/2020 8:54 AM   Pager: [phone]  Ext: [phone]

## 2020-01-07 NOTE — PLAN OF CARE
Problem: Falls - Risk of:  Goal: Will remain free from falls  Description  Will remain free from falls  1/7/2020 1111 by Geraldo Ch RN  Outcome: Met This Shift  1/7/2020 0803 by Maggie Owens RN  Outcome: Met This Shift  Goal: Absence of physical injury  Description  Absence of physical injury  1/7/2020 1111 by Geraldo Ch RN  Outcome: Met This Shift  1/7/2020 0803 by Maggie Owens RN  Outcome: Met This Shift     Problem: Injury - Risk of, Physical Injury:  Goal: Will remain free from falls  Description  Will remain free from falls  1/7/2020 1111 by Geraldo Ch RN  Outcome: Met This Shift  1/7/2020 0803 by Maggie Owens RN  Outcome: Met This Shift  Goal: Absence of physical injury  Description  Absence of physical injury  1/7/2020 1111 by Geraldo Ch RN  Outcome: Met This Shift  1/7/2020 0803 by Maggie Owens RN  Outcome: Met This Shift     Problem: Sensory Perception - Impaired:  Goal: Demonstrations of improved sensory functioning will increase  Description  Demonstrations of improved sensory functioning will increase  Outcome: Met This Shift  Note:   More alert but not oriented     Problem: Risk for Impaired Skin Integrity  Goal: Tissue integrity - skin and mucous membranes  Description  Structural intactness and normal physiological function of skin and  mucous membranes. 1/7/2020 1111 by Geraldo Ch RN  Outcome: Not Met This Shift  Note:   Multiple wounds    1/7/2020 0803 by Maggie Owens RN  Outcome: Met This Shift     Problem:  Activity:  Goal: Ability to safely and independently change position in bed will improve  Description  Ability to safely and independently change position in bed will improve  Outcome: Not Met This Shift  Goal: Muscle strength will improve  Description  Muscle strength will improve  Outcome: Not Met This Shift     Problem: Physical Regulation:  Goal: Ability to avoid complications of mobility impairment will improve  Description  Ability to avoid complications of mobility impairment will improve  Outcome: Not Met This Shift     Problem: Confusion - Acute:  Goal: Absence of continued neurological deterioration signs and symptoms  Description  Absence of continued neurological deterioration signs and symptoms  Outcome: Not Met This Shift  Goal: Mental status will be restored to baseline  Description  Mental status will be restored to baseline  Outcome: Not Met This Shift     Problem: Discharge Planning:  Goal: Ability to perform activities of daily living will improve  Description  Ability to perform activities of daily living will improve  Outcome: Not Met This Shift     Problem: Mood - Altered:  Goal: Mood stable  Description  Mood stable  Outcome: Not Met This Shift  Goal: Absence of abusive behavior  Description  Absence of abusive behavior  Outcome: Not Met This Shift     Problem: Psychomotor Activity - Altered:  Goal: Absence of psychomotor disturbance signs and symptoms  Description  Absence of psychomotor disturbance signs and symptoms  Outcome: Not Met This Shift     Problem: Sensory Perception - Impaired:  Goal: Decrease in sensory misperception frequency  Description  Decrease in sensory misperception frequency  Outcome: Not Met This Shift  Goal: Able to distinguish between reality-based and nonreality-based thinking  Description  Able to distinguish between reality-based and nonreality-based thinking  Outcome: Not Met This Shift     Problem: Sleep Pattern Disturbance:  Goal: Appears well-rested  Description  Appears well-rested  Outcome: Not Met This Shift

## 2020-01-07 NOTE — CONSULTS
Pulmonary/Critical Care Consult Note    CHIEF COMPLAINT: AMS    ISSUES:    Pneumonia  Severe Sepsis ([facility]  On antibiotics  On minimal O2    Cerebral artery occlusion with cerebral infarction ([facility]  Dementia ([facility]  Baseline  AMS improved    Severe protein-calorie malnutrition (HCC)  Baseline   Cachexia    Decubitus skin ulcer  Prior to admission  Wound care    Acute renal failure (ARF) ([facility]  Much improved    Hyperglycemia  HHNK  Glu controlled  Off drip    Goals of care  There is significant dispute about who DPOA for family is. Was on hospice, now in ICU  Needs to have clarification, Palliative care / Ethics consult given complexities. OK to transfer out of ICU    __________________________________________________    Referring MD: Dr. Christian Menjivar    Reason for Consult: AMS      HISTORY OF PRESENT ILLNESS:   78 y.o. male with a history of CAD with hx of PCI, GERD, and dementia who was brought in with altered mental itiyqu22 y.o. male with a history of CAD with hx of PCI, GERD, and dementia who was brought in with altered mental status. Found to have PNA, Acute Kidney Injury, and elevated glu    ALLERGY:  Patient has no known allergies. FAMILY HISTORY:  History reviewed. No pertinent family history.     SOCIAL HISTORY:  Social History     Socioeconomic History    Marital status:      Spouse name: Not on file    Number of children: Not on file    Years of education: Not on file    Highest education level: Not on file   Occupational History    Not on file   Social Needs    Financial resource strain: Not on file    Food insecurity:     Worry: Not on file     Inability: Not on file    Transportation needs:     Medical: Not on file     Non-medical: Not on file   Tobacco Use    Smoking status: Never Smoker    Smokeless tobacco: Never Used   Substance and Sexual Activity    Alcohol use: Yes     Comment: occasionally a couple beers    Drug use: No    Sexual activity: Not on file   Lifestyle    Physical activity:     Days per week: Not on file     Minutes per session: Not on file    Stress: Not on file   Relationships    Social connections:     Talks on phone: Not on file     Gets together: Not on file     Attends Scientologist service: Not on file     Active member of club or organization: Not on file     Attends meetings of clubs or organizations: Not on file     Relationship status: Not on file    Intimate partner violence:     Fear of current or ex partner: Not on file     Emotionally abused: Not on file     Physically abused: Not on file     Forced sexual activity: Not on file   Other Topics Concern    Not on file   Social History Narrative    Not on file       MEDICAL HISTORY:  Past Medical History:   Diagnosis Date    [facility]     Diabetes mellitus ([facility]     Hyperlipidemia     Hypertension        MEDICATIONS:   insulin lispro  0-6 Units Subcutaneous Q6H    piperacillin-tazobactam  3.375 g Intravenous Q8H    vancomycin (VANCOCIN) intermittent dosing (placeholder)   Other RX Placeholder    sodium chloride flush  10 mL Intravenous 2 times per day    enoxaparin  30 mg Subcutaneous Daily    pantoprazole  40 mg Intravenous Daily      sodium chloride 100 mL/hr at 01/07/20 0826    sodium chloride Stopped (01/07/20 1302)     morphine, dextrose, potassium chloride, magnesium sulfate, sodium phosphate IVPB **OR** sodium phosphate IVPB **OR** sodium phosphate IVPB, sodium chloride flush, magnesium hydroxide, ondansetron    REVIEW OF SYSTEMS:  Unable to provide due to AMS / dementia    PHYSICAL EXAM:  Vitals:    01/07/20 1300   BP: 101/60   Pulse: 112   Resp: 26   Temp:    SpO2: 94%        O2 Flow Rate (L/min): 6 L/min  O2 Device: High flow nasal cannula    General Appearance: cachectic, nonverbal, in no acute distress  Cardiovascular: normal rate, regular rhythm, normal S1 and S2, no murmurs, rubs, clicks, or gallops, distal pulses intact, no carotid bruits, no JVD  Pulmonary/Chest: upper airway / secretions ++, Coarse breath sounds no respiratory distress  Abdomen:scaphoid,  soft, non-tender, non-distended, normal bowel sounds, no masses   Extremities: no cyanosis, clubbing or edema, pulse   Skin: multiple decubs dressed  Head: normocephalic and atraumatic  Eyes: pupils equal, round, and reactive to light  Neck: supple and non-tender without mass, no thyromegaly   Musculoskeletal: normal range of motion, no joint swelling, deformity or tenderness  Neurological: dementia    LABS:  WBC   Date Value Ref Range Status   01/07/2020 17.9 (H) 4.5 - 11.5 E9/L Final   01/06/2020 24.0 (H) 4.5 - 11.5 E9/L Final   11/07/2019 5.6 4.5 - 11.5 E9/L Final     Hemoglobin   Date Value Ref Range Status   01/07/2020 12.2 (L) 12.5 - 16.5 g/dL Final   01/06/2020 15.7 12.5 - 16.5 g/dL Final   11/07/2019 10.6 (L) 12.5 - 16.5 g/dL Final     Hematocrit   Date Value Ref Range Status   01/07/2020 38.5 37.0 - 54.0 % Final   01/06/2020 50.3 37.0 - 54.0 % Final   11/07/2019 31.6 (L) 37.0 - 54.0 % Final     MCV   Date Value Ref Range Status   01/07/2020 92.1 80.0 - 99.9 fL Final   01/06/2020 93.1 80.0 - 99.9 fL Final   11/07/2019 88.5 80.0 - 99.9 fL Final     Platelets   Date Value Ref Range Status   01/07/2020 211 130 - 450 E9/L Final   01/06/2020 399 130 - 450 E9/L Final   11/07/2019 189 130 - 450 E9/L Final     Sodium   Date Value Ref Range Status   01/07/2020 149 (H) 132 - 146 mmol/L Final   01/07/2020 150 (H) 132 - 146 mmol/L Final   01/06/2020 151 (H) 132 - 146 mmol/L Final     Potassium   Date Value Ref Range Status   01/07/2020 5.6 (H) 3.5 - 5.0 mmol/L Final   01/07/2020 4.9 3.5 - 5.0 mmol/L Final   01/06/2020 3.4 (L) 3.5 - 5.0 mmol/L Final     Potassium reflex Magnesium   Date Value Ref Range Status   11/07/2019 4.2 3.5 - 5.0 mmol/L Final   11/06/2019 4.0 3.5 - 5.0 mmol/L Final   11/05/2019 3.5 3.5 - 5.0 mmol/L Final     Chloride   Date Value Ref Range Status   01/07/2020 118 (H) 98 - 107 mmol/L Final   01/07/2020 116 (H) 98 - 107 mmol/L Final   01/06/2020 119 (H) 98 - 107 mmol/L Final     CO2   Date Value Ref Range Status   01/07/2020 19 (L) 22 - 29 mmol/L Final   01/07/2020 20 (L) 22 - 29 mmol/L Final   01/06/2020 20 (L) 22 - 29 mmol/L Final     BUN   Date Value Ref Range Status   01/07/2020 42 (H) 8 - 23 mg/dL Final   01/07/2020 47 (H) 8 - 23 mg/dL Final   01/06/2020 53 (H) 8 - 23 mg/dL Final     CREATININE   Date Value Ref Range Status   01/07/2020 1.2 0.7 - 1.2 mg/dL Final   01/07/2020 1.3 (H) 0.7 - 1.2 mg/dL Final   01/06/2020 1.3 (H) 0.7 - 1.2 mg/dL Final     Glucose   Date Value Ref Range Status   01/07/2020 170 (H) 74 - 99 mg/dL Final   01/07/2020 287 (H) 74 - 99 mg/dL Final   01/06/2020 193 (H) 74 - 99 mg/dL Final   03/07/2011 159 (H) 70 - 110 mg/dL Final   10/15/2010 135 (H) 70 - 110 mg/dL Final     Calcium   Date Value Ref Range Status   01/07/2020 8.9 8.6 - 10.2 mg/dL Final   01/07/2020 8.9 8.6 - 10.2 mg/dL Final   01/06/2020 9.2 8.6 - 10.2 mg/dL Final     Total Protein   Date Value Ref Range Status   01/06/2020 7.4 6.4 - 8.3 g/dL Final   10/30/2019 6.7 6.4 - 8.3 g/dL Final   06/16/2019 7.1 6.4 - 8.3 g/dL Final     Albumin   Date Value Ref Range Status   03/07/2011 4.1 3.2 - 4.8 g/dL Final   10/15/2010 4.3 3.2 - 4.8 g/dL Final     Alb   Date Value Ref Range Status   01/06/2020 2.8 (L) 3.5 - 5.2 g/dL Final   10/30/2019 3.4 (L) 3.5 - 5.2 g/dL Final   06/16/2019 3.4 (L) 3.5 - 5.2 g/dL Final     Total Bilirubin   Date Value Ref Range Status   01/06/2020 0.5 0.0 - 1.2 mg/dL Final   10/30/2019 0.6 0.0 - 1.2 mg/dL Final   06/16/2019 0.8 0.0 - 1.2 mg/dL Final     Alkaline Phosphatase   Date Value Ref Range Status   01/06/2020 86 40 - 129 U/L Final   10/30/2019 95 40 - 129 U/L Final   06/16/2019 107 40 - 129 U/L Final     AST   Date Value Ref Range Status   01/06/2020 17 0 - 39 U/L Final   10/30/2019 17 0 - 39 U/L Final   06/16/2019 17 0 - 39 U/L Final     ALT   Date Value Ref Range Status 01/06/2020 17 0 - 40 U/L Final   10/30/2019 11 0 - 40 U/L Final   06/16/2019 18 0 - 40 U/L Final     GFR Non-   Date Value Ref Range Status   01/07/2020 58 >=60 mL/min/1.73 Final     Comment:     Chronic Kidney Disease: less than 60 ml/min/1.73 sq.m. Kidney Failure: less than 15 ml/min/1.73 sq.m. Results valid for patients 18 years and older. 01/07/2020 53 >=60 mL/min/1.73 Final     Comment:     Chronic Kidney Disease: less than 60 ml/min/1.73 sq.m. Kidney Failure: less than 15 ml/min/1.73 sq.m. Results valid for patients 18 years and older. 01/06/2020 53 >=60 mL/min/1.73 Final     Comment:     Chronic Kidney Disease: less than 60 ml/min/1.73 sq.m. Kidney Failure: less than 15 ml/min/1.73 sq.m. Results valid for patients 18 years and older. GFR    Date Value Ref Range Status   01/07/2020 >60  Final   01/07/2020 >60  Final   01/06/2020 >60  Final     Magnesium   Date Value Ref Range Status   01/07/2020 2.0 1.6 - 2.6 mg/dL Final   01/07/2020 2.3 1.6 - 2.6 mg/dL Final   01/06/2020 1.6 1.6 - 2.6 mg/dL Final     Phosphorus   Date Value Ref Range Status   01/07/2020 2.5 2.5 - 4.5 mg/dL Final   01/07/2020 2.6 2.5 - 4.5 mg/dL Final   01/06/2020 1.4 (L) 2.5 - 4.5 mg/dL Final     Recent Labs     01/06/20  1241   PH 7.463*   BE -5.4*   O2SAT 96.2       RADIOLOGY:  CT ABDOMEN PELVIS WO CONTRAST Additional Contrast? None   Final Result   1. Bilateral lower lobe dependent consolidations, likely pneumonia. 2. Moderate amount stool within the colon and rectum. CT HEAD WO CONTRAST   Final Result      Chronic ischemic changes with no evidence of hemorrhage or mass. XR CHEST PORTABLE   Final Result   1.  Negative portable chest.          (Independently reviewed by me)        Ramiro White M.D  Pulmonary & Critical Care  1/7/2020 1:33 PM

## 2020-01-07 NOTE — CONSULTS
Palliative Care Department  Palliative Care Initial Consult  [facility]    H[facility]: 2    Referring Provider:  Candi Kamara MD    Reason for Consult:  [x]  Code status Discussion  [x]  Assist with goals of care  [x]  Psychosocial support  [x]  Symptom Management  []  Advanced Care Planning    Chief Complaint: Avinash Brady is a 78 y.o. male with chief complaint of altered mental status and unresponsiveness      Active Hospital Problems    Diagnosis Date Noted    Severe protein-calorie malnutrition ([facility] [address] [E43] 10/31/2019    [facility] [A41.9] 10/30/2019       Palliative Care Encounter/Recommendations:      - Goals of care: achievement of a peaceful death, remain at home, support for family/caregiver and see discussion     - Code Status: DNR-CC     - Symptom Management:    Symptom Medications Number Doses in Past 24 hrs   Pain Morphine sulfate 0.5 mg IV Q 3 H prn 2   Nausea/vomiting Zofran 4 mg IV Q 6 H prn 0   Bowel Regime/constipation     Anxiety/agitation/depression Ativan 0.5 mg IV Q 6 H prn 0   SOB     Appetite     Sleep         IV Morphine ordered per ICU team.  Ativan 0.5 mg IV Q 6 H prn added by palliative medicine for comfort care. Subjective:     HPI:  Avinash Brady is a 78 y.o. male with significant past medical history of cerebral artery occlusion with CVA, diabetes mellitus, hyperlipidemia, dementia, GERD, CAD, history of PCI, and hypertension who presented to the ED on 1/6/2020 for altered mental status, that began approximately one hour prior to arrival, and was unresponsive to vocal and tactile stimulation. According to the family member who is POA, this change in mentation was sudden. Patient has been with [address] [place] [address] services and revoked to seek aggressive treatment. He was recently admitted in November 2019 and worked up for sepsis due to UTI/URI.   At that time the patient was changed to a DNR-CC status after an evaluation by palliative family members present continues. 1615 PM NP was later accompanied by spiritual care  for additional psychosocial support to the patient's family. Goals of care and CODE status reintroduced to approximately 12 family members. All are tearful but appropriate at this time. Daughters Hany Beverly, and Asia Gallegos, along with the patient's sister Chacho Castillo are all agreeable to make the patient a DNR-CC only at this time. The family express that the patient's wishes were that he did not want to live and be kept alive on support. All of the family members have concerns that the patient is not comfortable. Discussed symptom management for comfort care. The family verbalizes understanding that medication given will neither hasten nor prolong the patient's life. Psychosocial support provided to the family during and after this lengthy discussion. Unit RN updated. Orders written for [facility] and Ativan 0.5 mg IV every 6 hours as needed for anxiety/restlessness. Patient has current Morphine sulfate order every 3 hours as needed. 11/5/2019 per [facility] VENTURA  Met with Lesley Turner, patient's Sister Mikey Enrique, granddaughter Mikey Enrique, and another female relative. Discussed the differences with DNR CC versus DNR CCA. The patient does not want a PEG tube at this time.  Per Speech: Family was brought in to fluoro room and shown the MBSS and the distressful cough that is present.   Patient would rather eat, and family would like to see him eat. Diet has to be puréed, and liquids have to be moderate thickness (honey thick). Explained that if the patient eats and he chokes that he could impair his airway, it could go into his lung, and could potentially lead to his death. Patient's daughter who is power of  signed a waiver so the patient may eat. Patient was also changed to a DNR CC. Called Pharmacy to see which of the patients medications could be crushed, and then educated family.  Patient to be discharged 126   Temp 98.6 °F (37 °C) (Core)   Resp (!) 31   Ht 5' 8\" (1.727 m)   Wt 106 lb 3.2 oz (48.2 kg)   SpO2 98%   BMI 16.15 kg/m²     Gen:  Minimally responsive, appears chronically ill, cachexia  HEENT:  Normocephalic, conjunctiva pink, no drainage, mucosa moist  Neck:  Supple  Lungs:  Diminished, + audible rhonchi no  wheezes noted, Oxygen HF NC 6 liters  Heart[de-identified]  Tachycardic, irregular, no murmur, rub, or gallop noted during exam  Abd:  Soft, non tender, scaphoid, BS hypoactive x 4  :  [place], dark yellow, sediment  Ext:  Freely moving right upper extremity, no edema, pulses present, weak  Skin:  Warm/dry, pale, dressing intact left tibia, multiple other wounds covered  Neuro:  PERRL, lethargic, minimally responsive; minimally following commands, opens eyes briefly to verbal stimuli, furrowed brow    Imaging    CXR 1/6/2020  LUNGS: Clear with no areas of consolidation.        PLEURA: No pneumothorax visualized.       LUNG VOLUMES: Satisfactory inspirator effort.       MEDIASTINAL STRUCTURES: No lymphadenopathy. Normal aortic contour.        HEART SIZE: Normal.        BONES AND SOFT TISSUES: No fracture or soft tissue abnormality.          CT Head WO Contrast 1/6/2020  There is severe diffuse atrophy.  Severe amount of patchy   periventricular and subcortical white matter hypodensity noted, likely   chronic microvascular ischemic related.  There is no evidence of   intracranial hemorrhage or mass.  No extra-axial fluid is seen.  The   paranasal sinuses are clear.  The globes and orbits are normal.  No   abnormalities of the calvarium are identified. CT Abdomen/Pelvis WO Contrast 1/6/2020  1. Bilateral lower lobe dependent consolidations, likely pneumonia.    2. Moderate amount stool within the colon and rectum.         Current Medications:  Inpatient medications reviewed: yes  Home Medications reviewed: yes    Results/Verification of Data Review  Objective data reviewed: labs, images, records, medication use, vitals and chart      - Advanced Directives: Documents available in the [address] [place] [address] and Will ask the LSW to assist with completion   -  - Spiritual assessment: No spiritual distress identified     - Bereavement and grief: grief issues identified    - Discharge planning: to be determined    - Prognosis: Poor    - Referrals to: none today    Time/Communication  Greater than 51% of time spent, total 130 minutes in counseling and coordination of care at the bedside regarding goals of care, symptom management and diagnosis and prognosis. Assessment/Plan   1. Pneumonia/severe sepsis   - ICCU medical management following   -On IVATB, oxygen HFNC 6 liters  2. ARF   - improved  3. Hyperglycemia/HHNK   -  Glucose controlled  4. Palliative Medicine   - Goals of care; majority of the family would like for patient to go to an ECF upon discharge   - CODE STATUS; changed from limited code to [facility]   - Family support provided   - Ativan added for anxiety/restlessness, Morphine sulfate continued per order from ICCU team.   -  service support. Jamir [facility]  Palliative Medicine    Discussed patient and the plan of care with the other IDT members of Palliative Care, and with family and floor nurse. Thank you for allowing Palliative Medicine to participate in the care of [facility] [place]. Note: This report was completed using computerize voiced recognition software. Every effort has been made to ensure accuracy; however, inadvertent computerized transcription errors may be present.

## 2020-01-07 NOTE — FLOWSHEET NOTE
Dr. Jamie Galvan notified of Palliative meeting with family and their collective decision to make patient a DNR-CC. Still not clear as of the plans for the patient but Dr. Jamie Galvan stated he will discuss with palliative and sign DNR-CC Form.

## 2020-01-07 NOTE — PROGRESS NOTES
Nutrition Assessment (Enteral Nutrition)    Type and Reason for Visit: Initial, Positive Nutrition Screen, Consult    Nutrition Recommendations: Continue NPO  Noted consult for EN ordering/management however no feeding route at this time, will provide EN recommendation and order as appropriate. Noted DKA upon admit w/ continued intermittent elevated BSL. EN Recommendation: Diabetic @45ml/hr to provide: 1080ml, 1620kcals, 89gm pro, 820ml water     Please monitor for possible refeeding syndrome w/ initiation of nutrition given poor intake PTA. Nutrition Assessment: Pt admit w/ AMS noted H/o dementia, CVA. Noted PNA and DKA. Pt following w/ HOTV PTA however now revoking. Noted H/o dysphagia w/ previous refusal of PEG placement. No current feeding route despite consult for TF ordering. Noted elevated Na, K, BSL. Malnutrition Assessment:  · Malnutrition Status: Meets the criteria for severe malnutrition  · Context: Chronic illness  · Findings of the 6 clinical characteristics of malnutrition (Minimum of 2 out of 6 clinical characteristics is required to make the diagnosis of moderate or severe Protein Calorie Malnutrition based on AND/ASPEN Guidelines):  1. Energy Intake-Less than or equal to 75% of estimated energy requirement, Greater than or equal to 3 months    2. Weight Loss-20% loss or greater, (x9 mo)  3. Fat Loss-Severe subcutaneous fat loss,    4. Muscle Loss-Severe muscle mass loss,    5. Fluid Accumulation-No significant fluid accumulation,    6.   Strength-Not measured    Nutrition Risk Level: High    Nutrition Needs:  · Estimated Daily Total Kcal: (MSJ 1167 x1.3= 1517)  · Estimated Daily Protein (g): 70-85(1.5-1.8gm/kg CBW)    Nutrition Diagnosis:   · Problem: Severe malnutrition, In context of chronic illness  · Etiology: related to Cognitive or neurological impairment(noted H/o CVA w/ dysphagia in addition to H/o dementia)     Signs and symptoms:  as evidenced by Diet history of poor intake, Severe loss of subcutaneous fat, Severe muscle loss, Weight loss    Objective Information:  · Nutrition-Focused Physical Findings: hypoactive BS, soft abd, no noted feeding route at this time, H/o dysphagia, + I/Os, no noted edema, cachectic  · Wound Type: Unstageable, Deep Tissue Injury, Stage I, Pressure Ulcer, Multiple  · Current Nutrition Therapies:  · Oral Diet Orders: NPO   · Tube Feeding (TF) Orders:   · Feeding Route: (no feeding route at this time)  · Anthropometric Measures:  · Ht: 5' 8\" (172.7 cm)   · Current Body Wt: 106 lb (48.1 kg)(1/6 actual)  · Admission Body Wt: 110 lb (49.9 kg)(1/6 actual)  · Usual Body Wt: 133 lb (60.3 kg)(04/2019 per EMR, actual)  · Weight Change: noted 20.3% wt loss x9 mo   · Ideal Body Wt: 154 lb (69.9 kg), % Ideal Body 68%  · BMI Classification: BMI <18.5 Underweight    Nutrition Interventions:   Continued Inpatient Monitoring, Education not appropriate at this time, Coordination of Care    Nutrition Evaluation:   · Evaluation: Goals set   · Goals: Nutrition progression    · Monitoring: Nutrition Progression, Skin Integrity, Wound Healing, I&O, Monitor Hemodynamic Status, Mental Status/Confusion, Monitor Bowel Function, Weight, Pertinent Labs, Chewing/Swallowing      Electronically signed by Maxine Pedraza, MS, RD, LD on 1/7/20 at 11:33 AM  Contact Number: [phone]

## 2020-01-07 NOTE — PLAN OF CARE
Problem: Malnutrition  (NI-5.2)  Goal: Food and/or Nutrient Delivery  Description  Individualized approach for food/nutrient provision.   Outcome: Ongoing

## 2020-01-07 NOTE — CARE COORDINATION
1/7/2020  Call to [facility] pateint advocate who was involved in this patients family dynamic and power of  issues in November to review and obtain updated/current legal dpoa-hc papers which we currently are not seeing in epic or in the soft blue chart. Will obtain updated copy from daughter Ana Dallas when she arrives to icu.   Electronically signed by ELPIDIO Daily on 1/7/2020 at 8:14 AM

## 2020-01-07 NOTE — PROGRESS NOTES
Updated Dr Ayah Dominguez of patients condition. Updated that Georgie Montes and multiple sisters wanted no intubation and only want medications and comfort care.  Dr Ayah Dominguez aware of patient transfer

## 2020-01-07 NOTE — PATIENT CARE CONFERENCE
I participated in a family conference with the palliative care nurse practitioner and approximately 12 family members. The family consensus was to make the patient  DNR comfort care only. Patient's sister and three daughters affirmed this choice.

## 2020-01-08 LAB
ANION GAP SERPL CALCULATED.3IONS-SCNC: 11 MMOL/L (ref 7–16)
BASOPHILS ABSOLUTE: 0 E9/L (ref 0–0.2)
BASOPHILS RELATIVE PERCENT: 0.3 % (ref 0–2)
BUN BLDV-MCNC: 29 MG/DL (ref 8–23)
BURR CELLS: ABNORMAL
CALCIUM SERPL-MCNC: 9 MG/DL (ref 8.6–10.2)
CHLORIDE BLD-SCNC: 113 MMOL/L (ref 98–107)
CO2: 20 MMOL/L (ref 22–29)
CREAT SERPL-MCNC: 1.1 MG/DL (ref 0.7–1.2)
EOSINOPHILS ABSOLUTE: 0.21 E9/L (ref 0.05–0.5)
EOSINOPHILS RELATIVE PERCENT: 0.9 % (ref 0–6)
GFR AFRICAN AMERICAN: >60
GFR NON-AFRICAN AMERICAN: >60 ML/MIN/1.73
GLUCOSE BLD-MCNC: 98 MG/DL (ref 74–99)
HCT VFR BLD CALC: 38.6 % (ref 37–54)
HEMOGLOBIN: 12.2 G/DL (ref 12.5–16.5)
LYMPHOCYTES ABSOLUTE: 1.37 E9/L (ref 1.5–4)
LYMPHOCYTES RELATIVE PERCENT: 6 % (ref 20–42)
MAGNESIUM: 1.7 MG/DL (ref 1.6–2.6)
MCH RBC QN AUTO: 29 PG (ref 26–35)
MCHC RBC AUTO-ENTMCNC: 31.6 % (ref 32–34.5)
MCV RBC AUTO: 91.9 FL (ref 80–99.9)
METER GLUCOSE: 80 MG/DL (ref 74–99)
METER GLUCOSE: 96 MG/DL (ref 74–99)
METER GLUCOSE: 99 MG/DL (ref 74–99)
MONOCYTES ABSOLUTE: 0.23 E9/L (ref 0.1–0.95)
MONOCYTES RELATIVE PERCENT: 0.9 % (ref 2–12)
NEUTROPHILS ABSOLUTE: 20.98 E9/L (ref 1.8–7.3)
NEUTROPHILS RELATIVE PERCENT: 92.2 % (ref 43–80)
ORGANISM: ABNORMAL
PDW BLD-RTO: 13 FL (ref 11.5–15)
PHOSPHORUS: 2.2 MG/DL (ref 2.5–4.5)
PLATELET # BLD: 209 E9/L (ref 130–450)
PMV BLD AUTO: 11.5 FL (ref 7–12)
POIKILOCYTES: ABNORMAL
POTASSIUM SERPL-SCNC: 3.8 MMOL/L (ref 3.5–5)
RBC # BLD: 4.2 E12/L (ref 3.8–5.8)
SODIUM BLD-SCNC: 144 MMOL/L (ref 132–146)
WBC # BLD: 22.8 E9/L (ref 4.5–11.5)

## 2020-01-08 PROCEDURE — C9113 INJ PANTOPRAZOLE SODIUM, VIA: HCPCS | Performed by: INTERNAL MEDICINE

## 2020-01-08 PROCEDURE — 6360000002 HC RX W HCPCS: Performed by: INTERNAL MEDICINE

## 2020-01-08 PROCEDURE — 36415 COLL VENOUS BLD VENIPUNCTURE: CPT

## 2020-01-08 PROCEDURE — 2580000003 HC RX 258: Performed by: INTERNAL MEDICINE

## 2020-01-08 PROCEDURE — 1200000000 HC SEMI PRIVATE

## 2020-01-08 PROCEDURE — 84100 ASSAY OF PHOSPHORUS: CPT

## 2020-01-08 PROCEDURE — 99232 SBSQ HOSP IP/OBS MODERATE 35: CPT | Performed by: INTERNAL MEDICINE

## 2020-01-08 PROCEDURE — 2700000000 HC OXYGEN THERAPY PER DAY

## 2020-01-08 PROCEDURE — 99221 1ST HOSP IP/OBS SF/LOW 40: CPT | Performed by: SURGERY

## 2020-01-08 PROCEDURE — 82962 GLUCOSE BLOOD TEST: CPT

## 2020-01-08 PROCEDURE — 83735 ASSAY OF MAGNESIUM: CPT

## 2020-01-08 PROCEDURE — 80048 BASIC METABOLIC PNL TOTAL CA: CPT

## 2020-01-08 PROCEDURE — 85025 COMPLETE CBC W/AUTO DIFF WBC: CPT

## 2020-01-08 PROCEDURE — 31720 CLEARANCE OF AIRWAYS: CPT

## 2020-01-08 RX ORDER — ACETAMINOPHEN 650 MG/1
650 SUPPOSITORY RECTAL EVERY 4 HOURS PRN
Status: DISCONTINUED | OUTPATIENT
Start: 2020-01-08 | End: 2020-01-09 | Stop reason: HOSPADM

## 2020-01-08 RX ADMIN — SODIUM CHLORIDE, PRESERVATIVE FREE 10 ML: 5 INJECTION INTRAVENOUS at 07:59

## 2020-01-08 RX ADMIN — VANCOMYCIN HYDROCHLORIDE 750 MG: 10 INJECTION, POWDER, LYOPHILIZED, FOR SOLUTION INTRAVENOUS at 16:31

## 2020-01-08 RX ADMIN — SODIUM CHLORIDE, PRESERVATIVE FREE 10 ML: 5 INJECTION INTRAVENOUS at 05:58

## 2020-01-08 RX ADMIN — SODIUM CHLORIDE: 900 INJECTION, SOLUTION INTRAVENOUS at 01:30

## 2020-01-08 RX ADMIN — PANTOPRAZOLE SODIUM 40 MG: 40 INJECTION, POWDER, FOR SOLUTION INTRAVENOUS at 07:59

## 2020-01-08 RX ADMIN — SODIUM CHLORIDE: 900 INJECTION, SOLUTION INTRAVENOUS at 09:13

## 2020-01-08 RX ADMIN — SODIUM CHLORIDE, PRESERVATIVE FREE 10 ML: 5 INJECTION INTRAVENOUS at 03:30

## 2020-01-08 RX ADMIN — SODIUM CHLORIDE: 900 INJECTION, SOLUTION INTRAVENOUS at 17:45

## 2020-01-08 RX ADMIN — ENOXAPARIN SODIUM 30 MG: 30 INJECTION SUBCUTANEOUS at 09:11

## 2020-01-08 RX ADMIN — MORPHINE SULFATE 0.5 MG: 2 INJECTION, SOLUTION INTRAMUSCULAR; INTRAVENOUS at 00:30

## 2020-01-08 RX ADMIN — SODIUM CHLORIDE: 4.5 INJECTION, SOLUTION INTRAVENOUS at 12:55

## 2020-01-08 RX ADMIN — POTASSIUM CHLORIDE 10 MEQ: 10 INJECTION, SOLUTION INTRAVENOUS at 08:17

## 2020-01-08 RX ADMIN — MORPHINE SULFATE 0.5 MG: 2 INJECTION, SOLUTION INTRAMUSCULAR; INTRAVENOUS at 14:50

## 2020-01-08 RX ADMIN — SODIUM CHLORIDE, PRESERVATIVE FREE 10 ML: 5 INJECTION INTRAVENOUS at 00:32

## 2020-01-08 RX ADMIN — PIPERACILLIN AND TAZOBACTAM 3.38 G: 3; .375 INJECTION, POWDER, FOR SOLUTION INTRAVENOUS at 05:34

## 2020-01-08 RX ADMIN — SODIUM CHLORIDE, PRESERVATIVE FREE 10 ML: 5 INJECTION INTRAVENOUS at 21:04

## 2020-01-08 RX ADMIN — PIPERACILLIN AND TAZOBACTAM 3.38 G: 3; .375 INJECTION, POWDER, FOR SOLUTION INTRAVENOUS at 13:56

## 2020-01-08 RX ADMIN — PIPERACILLIN AND TAZOBACTAM 3.38 G: 3; .375 INJECTION, POWDER, FOR SOLUTION INTRAVENOUS at 21:03

## 2020-01-08 RX ADMIN — SODIUM CHLORIDE: 4.5 INJECTION, SOLUTION INTRAVENOUS at 03:55

## 2020-01-08 RX ADMIN — SODIUM CHLORIDE: 4.5 INJECTION, SOLUTION INTRAVENOUS at 21:30

## 2020-01-08 ASSESSMENT — PAIN SCALES - PAIN ASSESSMENT IN ADVANCED DEMENTIA (PAINAD)
FACIALEXPRESSION: 2
CONSOLABILITY: 0
BODYLANGUAGE: 1
BREATHING: 0
BODYLANGUAGE: 0
BREATHING: 1
NEGVOCALIZATION: 1
NEGVOCALIZATION: 1
CONSOLABILITY: 0
FACIALEXPRESSION: 2
FACIALEXPRESSION: 1
TOTALSCORE: 5
FACIALEXPRESSION: 2
BREATHING: 1
BODYLANGUAGE: 0
FACIALEXPRESSION: 2
NEGVOCALIZATION: 1
BREATHING: 1
BREATHING: 1
TOTALSCORE: 3
BODYLANGUAGE: 0
NEGVOCALIZATION: 0
NEGVOCALIZATION: 1
BREATHING: 0
BODYLANGUAGE: 1
FACIALEXPRESSION: 1
TOTALSCORE: 4
BREATHING: 0
NEGVOCALIZATION: 0
TOTALSCORE: 3
NEGVOCALIZATION: 0
BREATHING: 0
CONSOLABILITY: 1
BREATHING: 0
CONSOLABILITY: 1
TOTALSCORE: 5
BODYLANGUAGE: 1
BODYLANGUAGE: 1
CONSOLABILITY: 1
BODYLANGUAGE: 1
TOTALSCORE: 4
CONSOLABILITY: 0
FACIALEXPRESSION: 1
CONSOLABILITY: 1
NEGVOCALIZATION: 1
FACIALEXPRESSION: 2
TOTALSCORE: 3
TOTALSCORE: 4
CONSOLABILITY: 1
FACIALEXPRESSION: 2
TOTALSCORE: 5
BODYLANGUAGE: 0
CONSOLABILITY: 1
NEGVOCALIZATION: 1

## 2020-01-08 ASSESSMENT — PAIN SCALES - GENERAL
PAINLEVEL_OUTOF10: 0
PAINLEVEL_OUTOF10: 7
PAINLEVEL_OUTOF10: 0

## 2020-01-08 NOTE — CARE COORDINATION
1/8/2020  Family choice HOV- again to be consulted for this patient. Per Sr. Torres Shall conversation with family.   Electronically signed by Caridad Boswell RN-BC on 1/8/2020 at 10:41 AM

## 2020-01-08 NOTE — PROGRESS NOTES
[facility] Hospitalist   Progress Note    Admitting Date and Time: 1/6/2020 12:24 PM  Admit Dx: Sepsis ([facility] [place] [address] [A41.9]    Subjective:    Overnight, no acute issues as per RN. Patient has been made [address] again as per family wishes. D/w daughter Alberto Reid and family this morning, they seem are requesting Hospice for patient. ROS: Unable to attain appropriate ROS     insulin lispro  0-6 Units Subcutaneous Q6H    piperacillin-tazobactam  3.375 g Intravenous Q8H    vancomycin (VANCOCIN) intermittent dosing (placeholder)   Other RX Placeholder    sodium chloride flush  10 mL Intravenous 2 times per day    enoxaparin  30 mg Subcutaneous Daily    pantoprazole  40 mg Intravenous Daily     acetaminophen, 650 mg, Q4H PRN  morphine, 0.5 mg, Q3H PRN  LORazepam, 0.5 mg, Q6H PRN  dextrose, 12.5 g, PRN  magnesium sulfate, 1 g, PRN  sodium chloride flush, 10 mL, PRN  magnesium hydroxide, 30 mL, Daily PRN  ondansetron, 4 mg, Q6H PRN         Objective:    BP (!) 127/56   Pulse 112   Temp 99.7 °F (37.6 °C)   Resp 24   Ht 5' 8\" (1.727 m)   Wt 106 lb 3.2 oz (48.2 kg)   SpO2 99%   BMI 16.15 kg/m²   General Appearance: Pale, elderly individual resting comfortably in bed; not alert but arousable to vocal and tactile stimuli.     Skin: warm and dry, pale  Head: normocephalic and atraumatic  Eyes: pupils equal, round, and reactive to light, extraocular eye movements intact, conjunctivae normal  Neck: neck supple and non tender without mass; trachea midline    Pulmonary/Chest: B/L coarse BS- no wheezes, rales or rhonchi, normal air movement, no respiratory distress  Cardiovascular: normal rate, normal S1 and S2   Abdomen: soft, tender, non-distended, normal bowel sounds, no masses or organomegaly  Extremities: no cyanosis, no clubbing and no edema  Neurologic: Lethargic and confused          Recent Labs     01/07/20  0425 01/07/20  0815 01/08/20  0550   * 149* 144   K 4.9 5.6* 3.8   * 118* 113*   CO2 20* 19* 20*   BUN 47* 42* 29*   CREATININE 1.3* 1.2 1.1   GLUCOSE 287* 170* 98   CALCIUM 8.9 8.9 9.0       Recent Labs     01/06/20  1235 01/07/20  0515 01/08/20  0550   WBC 24.0* 17.9* 22.8*   RBC 5.40 4.18 4.20   HGB 15.7 12.2* 12.2*   HCT 50.3 38.5 38.6   MCV 93.1 92.1 91.9   MCH 29.1 29.2 29.0   MCHC 31.2* 31.7* 31.6*   RDW 12.9 12.9 13.0    211 209   MPV 11.6 11.2 11.5       Radiology:   CT ABDOMEN PELVIS WO CONTRAST Additional Contrast? None   Final Result   1. Bilateral lower lobe dependent consolidations, likely pneumonia. 2. Moderate amount stool within the colon and rectum. CT HEAD WO CONTRAST   Final Result      Chronic ischemic changes with no evidence of hemorrhage or mass. XR CHEST PORTABLE   Final Result   1. Negative portable chest.            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 *      Plan:  1. Acute Respiratory Failure with Hypoxia: Likely secondary to bilateral lower lobe pneumonia. CT imaging shows B/L lower lobe dependent consolidation suspicious for pneumonia. Initially, patient was on 15 L via nonrebreather mask but now maintaining adequate SPO2 greater than 90% on 4L via HFNC. Received antibiotic therapy and continue supplemental O2.   2. Sepsis secondary to Community Acquired Pneumonia: On presentation:Tmax 100.5; WBC 24. Lactic acid 3.4.  Afebrile overnight; WBC 17.9->22.8, trending up. Strep and Legionella antigen ordered.  Respiratory viral panel and influenza A/B screen neg.  Blood, sputum, and have poor insight into patient's current medical conditions and overall prognosis. She requests Hospice for her father at this time but prefers that he goes home. NOTE: This report was transcribed using voice recognition software. Every effort was made to ensure accuracy; however, inadvertent computerized transcription errors may be present.      Electronically signed by Diane Hess MD on 1/8/2020 at 8:30 AM

## 2020-01-08 NOTE — PLAN OF CARE
in care planning  Description  Participates in care planning  Outcome: Not Met This Shift     Problem: Injury - Risk of, Physical Injury:  Goal: Will remain free from falls  Description  Will remain free from falls  1/8/2020 1324 by Charissa Kelley RN  Outcome: Met This Shift  1/8/2020 0605 by Nikki Kiran RN  Outcome: Met This Shift  Goal: Absence of physical injury  Description  Absence of physical injury  1/8/2020 1324 by Charissa Kelley RN  Outcome: Met This Shift  1/8/2020 0605 by Nikki Kiran RN  Outcome: Met This Shift     Problem: Mood - Altered:  Goal: Mood stable  Description  Mood stable  Outcome: Ongoing  Goal: Absence of abusive behavior  Description  Absence of abusive behavior  Outcome: Met This Shift

## 2020-01-08 NOTE — CARE COORDINATION
1/8/2020 call to Julianne Woods per her request.  She needs to be updated as new discharge planning takes place. Unclear if the safest discharge plan would be back to the house. HOV has been consulted will have them evaluate discharge direction options. Abhi Mcwilliams and Buster Valerio updated- accordingly.     Electronically signed by ELPIDIO Goodman  on 1/8/2020 at 9:15 AM

## 2020-01-08 NOTE — PROGRESS NOTES
Pharmacy Consultation Note  (Antibiotic Dosing and Monitoring)    Initial consult date: 1/7/20  Consulting physician: Dr. Jessika Jaffe  Drug(s): Vancomycin  Indication: Pneumonia    Ht Readings from Last 1 Encounters:   01/06/20 5' 8\" (1.727 m)     Wt Readings from Last 1 Encounters:   01/06/20 106 lb 3.2 oz (48.2 kg)     Age/  Gender IBW DW  Allergy Information   78 y.o.   male -- 48.2 kg  Patient has no known allergies. Date  Tmax WBC BUN/CR UOP  (mL/kg/hr) Drug/Dose Time   Given Level(s)   (Time) Comments   1/6  (#1) 100.5 24 53/1.3 -- Vancomycin 1250 mg IV x 1 1526     1/7  (#2) afebrile 17.9 42/1.2 1150 mL Vancomycin 1000 mg IV x 1 1620 Rm level @ 1418 = 8.3 mcg/mL    1/8  (#3) afebrile 22.8 29/1.1 1.2 Vancomycin 750 mg IV q24hr <1630>       (#4)             (#5)             (#6)             (#7)             CrCl cannot be calculated (Unknown ideal weight.). UOP over the past 24 hours:       Intake/Output Summary (Last 24 hours) at 1/8/2020 1548  Last data filed at 1/8/2020 1448  Gross per 24 hour   Intake 2776 ml   Output 1550 ml   Net 1226 ml     Other anti-infectives: Anti-infective Dose Date Initiated Date Stopped   Acyclovir 500 mg IV x 1 1/6 1/6   Meropenem 1g IV x 1 1/6 1/6   Pip/tazo 3.375g IV q8hr 1/6      Cultures:  available culture and sensitivity results were reviewed in EPIC  Cultures sent and are pending. Culture Date Result    Rapid flu 1/6 Negative   CSF cx 1/6 No organisms   Respiratory film array 1/6 Negative   MRSA nares 1/6 MRSA positive   Wound cx (coccyx) 1/6 Growth present, evaluating for:  Strep species  Mixed GNR   Urine cx 1/6 NGTD   Strep/legionella urine antigens 1/6 Negative   Blood cx 1 1/6 NGTD   Blood cx 2 1/6 NGTD   HSV PCR  (CSF) 1/6    Respiratory cx 1/7 Heavy growth: Candida albicans     Assessment:  · Consulted by Dr. Jessika Jaffe to dose/monitor vancomycin  · Goal trough level:  15-20 mcg/mL  · Pt is a 78 yoM who presented from home with AMS.  Patient has history of dementia

## 2020-01-08 NOTE — PROGRESS NOTES
Pt turned skin care completed/ pt had soft medium bowel movement and was wincing and grimacing in pain as pt has large red hemorrhoids/redness/excoriation and an unstageable wound to his sacrum/coccyx area, pt asked if he wanted pain med and he said yes, medicated per advanced dementia assessment -as pt could not give a number as to the level of his pain. meplex to backside changed.

## 2020-01-08 NOTE — PROGRESS NOTES
Pt daughter Amarilis Franco states not to call anyone but her re: the care and plan for the patient. She states she is the power of . A sister named Patricia Rea wanted Dr Lucía Brown to call her and update her on his discussion with other family re pt plan of care.

## 2020-01-08 NOTE — CONSULTS
GENERAL SURGERY  CONSULT NOTE  2020    Physician Consulted: Dr. Mariposa Henriquez  Reason for Consult: sacral wound      HPI  Agustin Bailey is a 78 y.o. male who was a hospice patient who presents for evaluation of sepsis from PNA. Surgery was consulted for management of sacral wound. Yesterday evening the family decided to make the patient DNR-CC again and would only like medical management. Past Medical History:   Diagnosis Date    Cerebral artery occlusion with cerebral infarction ([facility] [address]     Diabetes mellitus ([facility] [address]     Hyperlipidemia     Hypertension        Past Surgical History:   Procedure Laterality Date    CARDIAC SURGERY      UPPER GASTROINTESTINAL ENDOSCOPY N/A 2019    EGD BIOPSY performed by Geovanna Guardado MD at [place] [address]       Medications Prior to Admission:    Prior to Admission medications    Medication Sig Start Date End Date Taking? Authorizing Provider   metFORMIN (GLUCOPHAGE) 1000 MG tablet Take 1,000 mg by mouth 2 times daily (with meals)   Yes Historical Provider, MD   LORazepam (ATIVAN) 0.5 MG tablet Take 0.5 mg by mouth every 4 hours as needed for Anxiety (Agitation). Yes Historical Provider, MD   senna (SENOKOT) 8.6 MG tablet Take 1 tablet by mouth daily as needed for Constipation   Yes Historical Provider, MD   mirtazapine (REMERON) 15 MG tablet Take 15 mg by mouth nightly   Yes Historical Provider, MD   pantoprazole (PROTONIX) 40 MG tablet Take 40 mg by mouth daily   Yes Historical Provider, MD   guaiFENesin 400 MG tablet Take 400 mg by mouth daily    Yes Historical Provider, MD   morphine sulfate 20 MG/ML concentrated oral solution Take 2.5 mg by mouth every 4 hours as needed for Pain (Shortness of Breath).    Yes Historical Provider, MD   albuterol (PROVENTIL) (2.5 MG/3ML) 0.083% nebulizer solution Take 2.5 mg by nebulization 4 times daily   Yes Historical Provider, MD   risperiDONE (RISPERDAL) 1 MG tablet Take 1 mg by mouth daily    Yes Historical Provider, MD [place] Known Allergies    History reviewed. No pertinent family history. Social History     Tobacco Use    Smoking status: Never Smoker    Smokeless tobacco: Never Used   Substance Use Topics    Alcohol use: Yes     Comment: occasionally a couple beers    Drug use: No         Review of Systems   General ROS: fever (-), chills (-)  Respiratory ROS: SOB (-)  Cardiovascular ROS: CP (-)  Gastrointestinal ROS: Nausea (-), vomiting (-), diarrhea (-), constipation (-), melena (-), hematochezia (-)        PHYSICAL EXAM:    Vitals:    01/08/20 0600   BP: 117/69   Pulse: 110   Resp: 19   Temp:    SpO2: 100%       General Appearance:  Awake, cooperative, lying in bed   Lungs:  Normal work of breathing   Heart:  Regular rate   Abdomen:  Soft, no tenderness, non distended   Sacrum:  unstageable sacral ulcer      LABS:  CBC  Recent Labs     01/08/20  0550   WBC 22.8*   HGB 12.2*   HCT 38.6        BMP  Recent Labs     01/08/20  0550      K 3.8   *   CO2 20*   BUN 29*   CREATININE 1.1   CALCIUM 9.0     Liver Function  Recent Labs     01/06/20  1235   BILITOT 0.5   AST 17   ALT 17   ALKPHOS 86   PROT 7.4   LABALBU 2.8*     No results for input(s): LACTATE in the last 72 hours. No results for input(s): INR, PTT in the last 72 hours. Invalid input(s): PT    RADIOLOGY  Ct Abdomen Pelvis Wo Contrast Additional Contrast? None    Result Date: 1/6/2020  Location: 200 Indication: Unresponsive, leukocytosis Comparison: None available. Technique: Multidetector CT imaging of the abdomen and pelvis was performed without administration of intravenous contrast. There is residual oral contrast within the colon. Coronal and sagittal reformatted images were obtained. Automated dose exposure control was used for this exam. FINDINGS: Lower thorax:  Limited evaluation of the lower chest demonstrates bilateral lower lobe dependent consolidations, likely pneumonia. There is no pleural effusion.  Peritoneum: There is no free fluid Chronic ischemic changes with no evidence of hemorrhage or mass. Xr Chest Portable    Result Date: 1/6/2020  LOCATION: 200 EXAM: XR CHEST PORTABLE COMPARISON: None HISTORY: Shortness of breath TECHNIQUE: Single frontal view of the chest was obtained. FINDINGS:  SUPPORT DEVICES: None. LUNGS: Clear with no areas of consolidation. PLEURA: No pneumothorax visualized. LUNG VOLUMES: Satisfactory inspirator effort. MEDIASTINAL STRUCTURES: No lymphadenopathy. Normal aortic contour. HEART SIZE: Normal. BONES AND SOFT TISSUES: No fracture or soft tissue abnormality. 1. Negative portable chest.          ASSESSMENT:  78 y.o. male with sacral ulcer. Given that he is [facility], he is not appropriate for surgical intervention.     PLAN:  Management per primary service  No acute surgical intervention planned  Call if family decides to change code status    Electronically signed by Devin Mendoza MD on 1/8/20 at 6:49 AM  Surgery Progress Note            Chief complaint:   Patient Active Problem List   Diagnosis    Urinary tract infection without hematuria    Cerebral artery occlusion with cerebral infarction ([address]    Weakness    Dementia ([address]    Severe protein-calorie malnutrition ([address]    Odynophagia    Sepsis ([address]    Primary degenerative dementia of the Alzheimer type, senile onset ([address]    Pharyngeal dysphagia    Acute renal failure (ARF) ([address]    Pneumonia    Decubitus skin ulcer    Diabetic acidosis without coma ([address]    Altered mental status    Goals of care, counseling/discussion    DNR (do not resuscitate) discussion    DNR (do not resuscitate)    Palliative care encounter    Pain management       S: as above     O:   Vitals:    01/08/20 0600   BP: 117/69   Pulse: 110   Resp: 19   Temp:    SpO2: 100%       Intake/Output Summary (Last 24 hours) at 1/8/2020 0733  Last data filed at 1/8/2020 0559  Gross per 24 hour   Intake 2826 ml   Output 1350 ml   Net 1476 ml           Labs:  Recent Labs

## 2020-01-09 VITALS
TEMPERATURE: 97.2 F | WEIGHT: 106.2 LBS | OXYGEN SATURATION: 99 % | DIASTOLIC BLOOD PRESSURE: 71 MMHG | RESPIRATION RATE: 20 BRPM | BODY MASS INDEX: 16.09 KG/M2 | SYSTOLIC BLOOD PRESSURE: 117 MMHG | HEIGHT: 68 IN | HEART RATE: 106 BPM

## 2020-01-09 LAB
ANION GAP SERPL CALCULATED.3IONS-SCNC: 14 MMOL/L (ref 7–16)
ANISOCYTOSIS: ABNORMAL
BASOPHILS ABSOLUTE: 0 E9/L (ref 0–0.2)
BASOPHILS RELATIVE PERCENT: 0.3 % (ref 0–2)
BUN BLDV-MCNC: 19 MG/DL (ref 8–23)
BURR CELLS: ABNORMAL
CALCIUM SERPL-MCNC: 8.9 MG/DL (ref 8.6–10.2)
CHLORIDE BLD-SCNC: 109 MMOL/L (ref 98–107)
CO2: 19 MMOL/L (ref 22–29)
CREAT SERPL-MCNC: 0.9 MG/DL (ref 0.7–1.2)
CULTURE, RESPIRATORY: ABNORMAL
CULTURE, RESPIRATORY: ABNORMAL
EOSINOPHILS ABSOLUTE: 0 E9/L (ref 0.05–0.5)
EOSINOPHILS RELATIVE PERCENT: 1 % (ref 0–6)
GFR AFRICAN AMERICAN: >60
GFR NON-AFRICAN AMERICAN: >60 ML/MIN/1.73
GLUCOSE BLD-MCNC: 114 MG/DL (ref 74–99)
HCT VFR BLD CALC: 37 % (ref 37–54)
HEMOGLOBIN: 12.1 G/DL (ref 12.5–16.5)
LYMPHOCYTES ABSOLUTE: 0.59 E9/L (ref 1.5–4)
LYMPHOCYTES RELATIVE PERCENT: 2.6 % (ref 20–42)
MAGNESIUM: 1.5 MG/DL (ref 1.6–2.6)
MCH RBC QN AUTO: 28.8 PG (ref 26–35)
MCHC RBC AUTO-ENTMCNC: 32.7 % (ref 32–34.5)
MCV RBC AUTO: 88.1 FL (ref 80–99.9)
METER GLUCOSE: 88 MG/DL (ref 74–99)
METER GLUCOSE: 88 MG/DL (ref 74–99)
METER GLUCOSE: 94 MG/DL (ref 74–99)
MONOCYTES ABSOLUTE: 0 E9/L (ref 0.1–0.95)
MONOCYTES RELATIVE PERCENT: 1.5 % (ref 2–12)
NEUTROPHILS ABSOLUTE: 19.21 E9/L (ref 1.8–7.3)
NEUTROPHILS RELATIVE PERCENT: 97.4 % (ref 43–80)
ORGANISM: ABNORMAL
PDW BLD-RTO: 12.9 FL (ref 11.5–15)
PHOSPHORUS: 2.6 MG/DL (ref 2.5–4.5)
PLATELET # BLD: 207 E9/L (ref 130–450)
PMV BLD AUTO: 11.8 FL (ref 7–12)
POIKILOCYTES: ABNORMAL
POLYCHROMASIA: ABNORMAL
POTASSIUM SERPL-SCNC: 3.3 MMOL/L (ref 3.5–5)
RBC # BLD: 4.2 E12/L (ref 3.8–5.8)
SMEAR, RESPIRATORY: ABNORMAL
SODIUM BLD-SCNC: 142 MMOL/L (ref 132–146)
URINE CULTURE, ROUTINE: NORMAL
WBC # BLD: 19.8 E9/L (ref 4.5–11.5)
WOUND/ABSCESS: NORMAL

## 2020-01-09 PROCEDURE — 84100 ASSAY OF PHOSPHORUS: CPT

## 2020-01-09 PROCEDURE — 82962 GLUCOSE BLOOD TEST: CPT

## 2020-01-09 PROCEDURE — C9113 INJ PANTOPRAZOLE SODIUM, VIA: HCPCS | Performed by: INTERNAL MEDICINE

## 2020-01-09 PROCEDURE — 2700000000 HC OXYGEN THERAPY PER DAY

## 2020-01-09 PROCEDURE — 2580000003 HC RX 258: Performed by: INTERNAL MEDICINE

## 2020-01-09 PROCEDURE — 83735 ASSAY OF MAGNESIUM: CPT

## 2020-01-09 PROCEDURE — 6360000002 HC RX W HCPCS: Performed by: INTERNAL MEDICINE

## 2020-01-09 PROCEDURE — 36415 COLL VENOUS BLD VENIPUNCTURE: CPT

## 2020-01-09 PROCEDURE — 80048 BASIC METABOLIC PNL TOTAL CA: CPT

## 2020-01-09 PROCEDURE — 85025 COMPLETE CBC W/AUTO DIFF WBC: CPT

## 2020-01-09 PROCEDURE — 99239 HOSP IP/OBS DSCHRG MGMT >30: CPT | Performed by: INTERNAL MEDICINE

## 2020-01-09 RX ADMIN — SODIUM CHLORIDE, PRESERVATIVE FREE 10 ML: 5 INJECTION INTRAVENOUS at 08:26

## 2020-01-09 RX ADMIN — PIPERACILLIN AND TAZOBACTAM 3.38 G: 3; .375 INJECTION, POWDER, FOR SOLUTION INTRAVENOUS at 05:19

## 2020-01-09 RX ADMIN — PANTOPRAZOLE SODIUM 40 MG: 40 INJECTION, POWDER, FOR SOLUTION INTRAVENOUS at 08:25

## 2020-01-09 RX ADMIN — SODIUM CHLORIDE: 900 INJECTION, SOLUTION INTRAVENOUS at 01:59

## 2020-01-09 RX ADMIN — ENOXAPARIN SODIUM 30 MG: 30 INJECTION SUBCUTANEOUS at 08:25

## 2020-01-09 RX ADMIN — PIPERACILLIN AND TAZOBACTAM 3.38 G: 3; .375 INJECTION, POWDER, FOR SOLUTION INTRAVENOUS at 13:23

## 2020-01-09 RX ADMIN — SODIUM CHLORIDE: 900 INJECTION, SOLUTION INTRAVENOUS at 11:53

## 2020-01-09 NOTE — DISCHARGE INSTR - COC
Continuity of Care Form    Patient Name: Keri Reyna   :  1940  MRN:  [id number]    Admit date:  2020  Discharge date:  2020    Code Status Order: [facility]   Advance Directives:   [address] Directive Type of Healthcare Directive Copy in [address] Agent's Name Healthcare Agent's Phone Number    [phone]  Yes, patient has an advance directive for healthcare treatment  Durable power of  for health care  Yes, copy in [place] [address] of   [facility]  --          Admitting Physician:  Missy Ramos MD  PCP: No primary care provider on file. Discharging Nurse: [facility] Unit/Room#: 1210/2152-22  Discharging Unit Phone Number: [phone]    Emergency Contact:   Extended Emergency Contact Information  Primary Emergency Contact: Janae Borjas  [address] [facility] [address] [place] [address] Phone: [phone]  Mobile Phone: [phone]  Relation: Child   needed? No  Secondary Emergency Contact: [address] [place] [address] Phone: [phone]  Relation: Grandchild    Past Surgical History:  Past Surgical History:   Procedure Laterality Date    CARDIAC SURGERY      UPPER GASTROINTESTINAL ENDOSCOPY N/A 2019    EGD BIOPSY performed by Rudy Young MD at [facility] ENDOSCOPY       Immunization History: There is no immunization history on file for this patient.     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) E11.10    Altered mental status R41.82    Goals of care, counseling/discussion Z71.89    DNR (do not resuscitate) discussion Z70.80    DNR (do not resuscitate) Z66    Palliative care encounter Z51.5    Pain management R52       Isolation/Infection:   Isolation          No Isolation        Patient Infection Status     Infection Onset Added Last Indicated Last Indicated By Review Planned Expiration Resolved Resolved By    None active    Resolved    MRSA 01/06/20 01/08/20 01/06/20 MRSA SCREENING CULTURE ONLY   01/09/20 Laquita Dill RN          Nurse Assessment:  Last Vital Signs: /71   Pulse 106   Temp 97.2 °F (36.2 °C) (Axillary)   Resp 20   Ht 5' 8\" (1.727 m)   Wt 106 lb 3.2 oz (48.2 kg)   SpO2 99%   BMI 16.15 kg/m²     Last documented pain score (0-10 scale): Pain Level: 0  Last Weight:   Wt Readings from Last 1 Encounters:   01/06/20 106 lb 3.2 oz (48.2 kg)     Mental Status:  disoriented    IV Access:  - None    Nursing Mobility/ADLs:  Walking   Dependent  Transfer  Dependent  Bathing  Dependent  Dressing  Dependent  Toileting  Dependent  Feeding  Dependent  Med Admin  Dependent  Med Delivery   none    Wound Care Documentation and Therapy:  Wound 10/30/19 Heel Left (Active)   Number of days: 70       Wound 01/06/20 Sacrum (Active)   Wound Image   1/7/2020 10:41 AM   Wound Pressure Unstageable 1/8/2020  8:00 AM   Dressing Status Clean;Dry; Intact 1/9/2020  8:00 AM   Dressing Changed Other (Comment) 1/8/2020  8:00 PM   Dressing/Treatment Foam 1/9/2020  8:00 AM   Wound Cleansed Rinsed/Irrigated with saline 1/8/2020  4:00 AM   Wound Length (cm) 6 cm 1/7/2020 10:41 AM   Wound Width (cm) 6.5 cm 1/7/2020 10:41 AM   Wound Surface Area (cm^2) 39 cm^2 1/7/2020 10:41 AM   Change in Wound Size % (l*w) 0 1/7/2020 10:41 AM   Wound Assessment Fragile;Painful;Red;Purple;Black 1/8/2020  8:00 PM   Drainage Amount Scant 1/8/2020  8:00 PM   Drainage Description Serosanguinous 1/8/2020  8:00 PM   Tatyana-wound Assessment Purple;Red;Painful;Fragile 1/8/2020  8:00 PM   Culture Taken Yes 1/6/2020  8:24 PM   Number of days: 2       Wound 01/06/20 Ankle Right (Active)   Wound Pressure Stage  1 1/8/2020  8:00 AM   Dressing Status Clean;Dry; Intact 1/9/2020  8:00 AM   Dressing Changed Other (Comment) 1/8/2020  8:00 PM   Dressing/Treatment Foam 1/9/2020  8:00 AM   Wound Length (cm) 2 cm 1/7/2020 10:41 AM   Wound Width (cm) 1.5 cm 1/7/2020 10:41 AM   Wound Surface Area (cm^2) 3 cm^2 1/7/2020 10:41 AM   Change in Wound Size % (l*w) 0 1/7/2020 10:41 AM   Wound Assessment Non-blanchable erythema;Purple;Red 1/8/2020  8:00 PM   Odor None 1/7/2020 10:41 AM   Number of days: 2       Wound 01/06/20 Heel Left; Inner (Active)   Wound Deep tissue/Injury 1/8/2020  8:00 AM   Dressing Status Clean;Dry; Intact 1/9/2020  8:00 AM   Dressing Changed Other (Comment) 1/8/2020  8:00 PM   Dressing/Treatment Foam 1/9/2020  8:00 AM   Wound Length (cm) 2 cm 1/6/2020  8:24 PM   Wound Width (cm) 1 cm 1/6/2020  8:24 PM   Wound Surface Area (cm^2) 2 cm^2 1/6/2020  8:24 PM   Wound Assessment Purple;Non-blanchable erythema 1/8/2020  8:00 PM   Drainage Amount None 1/8/2020  8:00 PM   Odor None 1/8/2020  8:00 PM   Number of days: 2       Wound 01/06/20 Heel Right; Outer (Active)   Wound Deep tissue/Injury 1/8/2020  8:00 AM   Dressing Status Clean;Dry; Intact 1/9/2020  8:00 AM   Dressing Changed Other (Comment) 1/8/2020  8:00 PM   Dressing/Treatment Foam 1/8/2020 11:43 PM   Wound Length (cm) 2 cm 1/6/2020  8:24 PM   Wound Width (cm) 1 cm 1/6/2020  8:24 PM   Wound Surface Area (cm^2) 2 cm^2 1/6/2020  8:24 PM   Wound Assessment Purple;Non-blanchable erythema 1/8/2020  8:00 PM   Odor None 1/8/2020  8:00 PM   Tatyana-wound Assessment Red 1/8/2020  8:00 PM   Number of days: 2       Wound 01/06/20 Tibial Left; Outer (Active)   Wound Image   1/7/2020 10:41 AM   Wound Deep tissue/Injury 1/8/2020

## 2020-01-09 NOTE — CARE COORDINATION
CM note: Notified Rowena Faye at [facility] of pending discharge at 6pm to [facility] with Hospice

## 2020-01-09 NOTE — CARE COORDINATION
1/9/2020 late entry-  On call case mgmt- call from Ruddy Galeana family has requested [facility] with hospice services. This will need phoned in today 1/9/2020. Per Kimber Mix patient advocate- call received stating she spoke to Erasmo Casey and he is the 4th child of this patient that requests SNF with hospice services. Updated CM on 4th floor for continued transitional planning.       Electronically signed by ELPIDIO Barone on 1/9/2020 at 7:38 AM

## 2020-01-09 NOTE — DISCHARGE SUMMARY
[facility] Physician Discharge Summary       No follow-up provider specified. Activity level: Bedrest    Diet: Diet NPO Effective Now    Labs:No labs pending    Condition at discharge: Stable    Dispo:SNF with Hospice      Patient ID:  Bishop Ray  [id number]  63 y.o.  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 *      Consults:  IP CONSULT TO CRITICAL CARE  IP CONSULT TO PHARMACY  IP CONSULT TO DIETITIAN  PHARMACY TO DOSE VANCOMYCIN  IP CONSULT TO PALLIATIVE CARE  IP CONSULT TO GENERAL SURGERY  IP CONSULT TO HOSPICE      History of Present Illness:  78 y.o. male with a history of CAD with hx of PCI, GERD, and dementia who was brought in with altered mental status. He had recently been admitted in November 2019 and worked up for sepsis secondary to UTI/URI as well as dysphagia.   At that time, made DNR CC prior to this request as per the wishes of the family and was subsequently transferred to general medical floor. Patient attained placement and thereafter was transferred to SNF with hospice. Discharge Exam:  Vitals:    01/08/20 2000 01/08/20 2100 01/08/20 2200 01/09/20 0800   BP: 115/70 122/61 121/74 117/71   Pulse: 100 99 91 106   Resp: 20 15 17 20   Temp: 98.6 °F (37 °C)  98.8 °F (37.1 °C) 97.2 °F (36.2 °C)   TempSrc: Core  Core Axillary   SpO2: 99% 100% 100% 99%   Weight:       Height:           General Appearance: Pale, elderly individual resting comfortably in bed; not alert but arousable to vocal and tactile stimuli.    Skin: warm and dry, pale  Head: normocephalic and atraumatic  Eyes: pupils equal, round, and reactive to light, extraocular eye movements intact, conjunctivae normal  Neck: neck supple and non tender without mass; trachea midline    Pulmonary/Chest: B/L coarse BS- no wheezes, rales or rhonchi, normal air movement, no respiratory distress  Cardiovascular: normal rate, normal S1 and S2   Abdomen: soft, tender, non-distended, normal bowel sounds, no masses or organomegaly  Extremities: no cyanosis, no clubbing and no edema  Neurologic: Lethargic and confused     I/O last 3 completed shifts: In: 9694 [I.V.:1274; IV Piggyback:314]  Out: 9608 [Urine:1575]  No intake/output data recorded. LABS:  Recent Labs     01/07/20  0815 01/08/20  0550 01/09/20  0824   * 144 142   K 5.6* 3.8 3.3*   * 113* 109*   CO2 19* 20* 19*   BUN 42* 29* 19   CREATININE 1.2 1.1 0.9   GLUCOSE 170* 98 114*   CALCIUM 8.9 9.0 8.9       Recent Labs     01/07/20  0515 01/08/20  0550 01/09/20  0824   WBC 17.9* 22.8* 19.8*   RBC 4.18 4.20 4.20   HGB 12.2* 12.2* 12.1*   HCT 38.5 38.6 37.0   MCV 92.1 91.9 88.1   MCH 29.2 29.0 28.8   MCHC 31.7* 31.6* 32.7   RDW 12.9 13.0 12.9    209 207   MPV 11.2 11.5 11.8       No results for input(s): POCGLU in the last 72 hours.         Imaging:  Ct Abdomen CONTRAST COMPARISON: None HISTORY: Confusion TECHNIQUE: Noncontrast helical CT images were performed of the head. Coronal and sagittal reconstructions also obtained. Automated dose control was used for this exam. CONTRAST: No intravenous contrast administered. FINDINGS: There is severe diffuse atrophy. Severe amount of patchy periventricular and subcortical white matter hypodensity noted, likely chronic microvascular ischemic related. There is no evidence of intracranial hemorrhage or mass. No extra-axial fluid is seen. The paranasal sinuses are clear. The globes and orbits are normal.  No abnormalities of the calvarium are identified. Chronic ischemic changes with no evidence of hemorrhage or mass. Xr Chest Portable    Result Date: 1/6/2020  LOCATION: 200 EXAM: XR CHEST PORTABLE COMPARISON: None HISTORY: Shortness of breath TECHNIQUE: Single frontal view of the chest was obtained. FINDINGS:  SUPPORT DEVICES: None. LUNGS: Clear with no areas of consolidation. PLEURA: No pneumothorax visualized. LUNG VOLUMES: Satisfactory inspirator effort. MEDIASTINAL STRUCTURES: No lymphadenopathy. Normal aortic contour. HEART SIZE: Normal. BONES AND SOFT TISSUES: No fracture or soft tissue abnormality.      1. Negative portable chest.          Patient Instructions:   Current Discharge Medication List      CONTINUE these medications which have NOT CHANGED    Details   senna (SENOKOT) 8.6 MG tablet Take 1 tablet by mouth daily as needed for Constipation      mirtazapine (REMERON) 15 MG tablet Take 15 mg by mouth nightly      albuterol (PROVENTIL) (2.5 MG/3ML) 0.083% nebulizer solution Take 2.5 mg by nebulization 4 times daily      risperiDONE (RISPERDAL) 1 MG tablet Take 1 mg by mouth daily          STOP taking these medications       metFORMIN (GLUCOPHAGE) 1000 MG tablet Comments:   Reason for Stopping:         LORazepam (ATIVAN) 0.5 MG tablet Comments:   Reason for Stopping:         pantoprazole (PROTONIX) 40 MG tablet Comments:   Reason for Stopping:         guaiFENesin 400 MG tablet Comments:   Reason for Stopping:         morphine sulfate 20 MG/ML concentrated oral solution Comments:   Reason for Stopping:                 Note that more than 30 minutes was spent in preparing discharge papers, discussing discharge with patient, medication review, etc.    NOTE: This report was transcribed using voice recognition software.  Every effort was made to ensure accuracy; however, inadvertent computerized transcription errors may be present.     Signed:  Electronically signed by Yoanna Soto MD on 1/9/2020 at 1:14 PM

## 2020-01-09 NOTE — PROGRESS NOTES
Follow up visit to room 431 with Dr. Ang Green. Dr. Ang Green made recommendations to family to continue with the plan of nursing home placement with hospice support in his care. Daughter Jenny Park had concerns about her father not receiving IV fluids. I did explain tot he daughter the burdens of continuing IV fluids at end of life. I explained that as the body systems shut down including heart, lungs and kidneys, continuing IV fluids have no benefit to the patient, but may cause increasing fluid retention with increasing shortness of breath. The daughter Padma avendano an is agreeable to no IV fluids at the facility. Janine Landeros remains committed to fighting for her father to go home with her caring for him at home. Dr. Ang Green explained that since  There is not a valid legal DPOA , that the majority of the siblings decision is what is respected. The majority of the siblings are in agreement with nursing home placement with hospice care. The patient has been declined at [facility] [place], and [facility] Waiting for [facility] to review.      Will continue to follow

## 2020-01-09 NOTE — PROGRESS NOTES
Follow up with . The patient has been accepted to [facility] [place]. Medical equipment needs identified as Oxygen 3 liters nasal cannula continuous and Low air loss mattress. Face sheet faxed to [facility] for delivery today between 2-5. Representative aware that the patient is being discharged from the hospital at 530 pm and the equipment has to be there by 5 pm.  made arrangement for trasport at 530 pm.   Call placed to Dr. Gavino Brown. He has signed the [facility] form and left scripts for Morphine concentrate, Ativan and Robinul for the patient use at the nursing home. Copy of scripts given to [facility] rep Kristen and also copy faxed to intake. [facility] form faxed to intake and copy also left in packet for the nursing home. PCP at the facility will be Dr. Maggie Mendez and they will have an order placed on the chart for hospice. [facility] [place] to call [facility] when the patient arrives so Hospice nurse can arrive to do admission. [facility] election of benefits permits signed by 2 daughters Magui Singhdyanns and Kristen Gill. Tammy lives locally in [place] and [address] [place] [address] in [facility]. HOSPITALIST DR. MARTI HAS PROVIDED TERMINAL DIAGNOSIS WITH PROGNOSIS LESS THAN 6 MONTHS IF THE DISEASE FOLLOWS THE NORMAL COURSE WITH DIAGNOSIS SEPSIS DUE TO MULTIFOCAL PNEUMONIA. He stated that it could be possible aspiration and community acquired pneumonia. Permits have been faxed to intake and also originals placed in the envelope for the nursing home along with the scripts. Arlene Turpinhelen does continue to complain about the patient being placed in nursing home, but the majority of the family is in agreement with nursing home placement with hospice, therefore since there is no legal DPOA, the decision is based on the majority. Hospice admission will take place at the facility when a head to toe assessment is done. Family will be at the facility, and plan to come when the patient comes.

## 2020-01-10 LAB
HERPES SIMPLEX VIRUS BY PCR: NOT DETECTED
HSV SOURCE: NORMAL

## 2020-01-11 LAB
BLOOD CULTURE, ROUTINE: NORMAL
CSF CULTURE: NORMAL
CULTURE, BLOOD 2: NORMAL
GRAM STAIN RESULT: NORMAL

## (undated) DEVICE — 6 X 9  1.75MIL 4-WALL LABGUARD: Brand: MINIGRIP COMMERCIAL LLC

## (undated) DEVICE — LUBRICANT SURG JELLY ST BACTER TUBE 4.25OZ

## (undated) DEVICE — TOWEL,OR,DSP,ST,BLUE,STD,6/PK,12PK/CS: Brand: MEDLINE

## (undated) DEVICE — KIT BEDSIDE REVITAL OX 500ML

## (undated) DEVICE — GOWN ISOLATN REG YEL M WT MULTIPLY SIDETIE LEV 2

## (undated) DEVICE — KENDALL 450 SERIES MONITORING FOAM ELECTRODE - RECTANGULAR SHAPE ( 3/PK): Brand: KENDALL

## (undated) DEVICE — FORCEPS BX L240CM JAW DIA2.8MM L CAP W/ NDL MIC MESH TOOTH

## (undated) DEVICE — TUBING, SUCTION, 1/4" X 10', STRAIGHT: Brand: MEDLINE

## (undated) DEVICE — COVER,LIGHT HANDLE,FLX,1/PK: Brand: MEDLINE INDUSTRIES, INC.

## (undated) DEVICE — MASK,FACE,MAXFLUIDPROTECT,SHIELD/ERLPS: Brand: MEDLINE

## (undated) DEVICE — CONTAINER SPEC COLL 960ML POLYPR TRIANG GRAD INTAKE/OUTPUT

## (undated) DEVICE — YANKAUER,BULB TIP,W/O VENT,RIGID,STERILE: Brand: MEDLINE

## (undated) DEVICE — Device: Brand: DEFENDO VALVE AND CONNECTOR KIT

## (undated) DEVICE — BLOCK BITE 60FR CAREGUARD

## (undated) DEVICE — SPONGE GZ 4IN 4IN 4 PLY N WVN AVANT